# Patient Record
Sex: FEMALE | Race: WHITE | NOT HISPANIC OR LATINO | Employment: OTHER | URBAN - NONMETROPOLITAN AREA
[De-identification: names, ages, dates, MRNs, and addresses within clinical notes are randomized per-mention and may not be internally consistent; named-entity substitution may affect disease eponyms.]

---

## 2017-01-16 RX ORDER — PROMETHAZINE HYDROCHLORIDE 25 MG/1
TABLET ORAL
Qty: 60 TABLET | Refills: 0 | Status: SHIPPED | OUTPATIENT
Start: 2017-01-16 | End: 2017-07-05 | Stop reason: SDUPTHER

## 2017-02-06 ENCOUNTER — OFFICE VISIT (OUTPATIENT)
Dept: GASTROENTEROLOGY | Facility: CLINIC | Age: 57
End: 2017-02-06

## 2017-02-06 VITALS
SYSTOLIC BLOOD PRESSURE: 136 MMHG | WEIGHT: 190.4 LBS | OXYGEN SATURATION: 92 % | HEIGHT: 67 IN | DIASTOLIC BLOOD PRESSURE: 79 MMHG | HEART RATE: 88 BPM | BODY MASS INDEX: 29.88 KG/M2

## 2017-02-06 DIAGNOSIS — K92.1 HEMATOCHEZIA: ICD-10-CM

## 2017-02-06 DIAGNOSIS — R14.0 ABDOMINAL BLOATING: Primary | ICD-10-CM

## 2017-02-06 DIAGNOSIS — K59.00 CONSTIPATION, UNSPECIFIED CONSTIPATION TYPE: ICD-10-CM

## 2017-02-06 DIAGNOSIS — R11.0 NAUSEA: ICD-10-CM

## 2017-02-06 DIAGNOSIS — T40.2X5A THERAPEUTIC OPIOID INDUCED CONSTIPATION: ICD-10-CM

## 2017-02-06 DIAGNOSIS — R10.84 GENERALIZED ABDOMINAL PAIN: ICD-10-CM

## 2017-02-06 DIAGNOSIS — K59.03 THERAPEUTIC OPIOID INDUCED CONSTIPATION: ICD-10-CM

## 2017-02-06 PROCEDURE — 99214 OFFICE O/P EST MOD 30 MIN: CPT | Performed by: PHYSICIAN ASSISTANT

## 2017-02-06 NOTE — PROGRESS NOTES
Chief Complaint   Patient presents with   • GI Problem     proctitis   • Heartburn     GERD       Nasrin Lizama is a 56 y.o. female who presents to the office today for follow up appointment for GI Problem (proctitis) and Heartburn (GERD)  .    HPI    The patient is here for a follow up of proctitis and heartburn.  She reports that when she uses the bathroom, she has a very small amount of small with a small amount of blood in it.  Patient reports no rectal pain other than hemorrhoids.  She takes zantac 150 mg every morning for acid reflux and takes TUMS almost every morning.  Patient also reports nausea, occasional abdominal pain in lower quadrants.  She takes oxycodone 2-3 times per day.  In the past, the patient has taken Miralax, Amitiza, Apriso, and over the counter stool softeners which have failed to alleviate constipation.      Review of Systems   Constitutional: Positive for fatigue.   HENT: Positive for congestion, rhinorrhea, sneezing and sore throat.    Eyes: Negative.    Respiratory: Positive for cough, shortness of breath and wheezing.    Cardiovascular: Negative.    Gastrointestinal: Positive for abdominal distention, abdominal pain, anal bleeding, blood in stool, constipation and nausea. Negative for diarrhea, rectal pain and vomiting.   Endocrine: Negative.    Genitourinary: Positive for difficulty urinating.   Musculoskeletal: Positive for arthralgias, back pain and myalgias.   Skin: Positive for rash.   Allergic/Immunologic: Positive for environmental allergies.   Neurological: Negative.    Hematological: Bruises/bleeds easily.   Psychiatric/Behavioral: Positive for sleep disturbance. The patient is nervous/anxious.        ACTIVE PROBLEMS:   Specialty Problems        Gastroenterology Problems    Constipation        Irritable bowel syndrome without diarrhea              PAST MEDICAL HISTORY:  Past Medical History   Diagnosis Date   • Anxiety    • Arthritis    • Back pain    • COPD (chronic  obstructive pulmonary disease)    • Depression    • Diabetes mellitus    • H/O colonoscopy        SURGICAL HISTORY:  Past Surgical History   Procedure Laterality Date   • Appendectomy     • Hysterectomy     • Nose surgery     • Tubal abdominal ligation     • Colonoscopy     • Upper gastrointestinal endoscopy     • Endoscopy N/A 6/22/2016     Procedure: ESOPHAGOGASTRODUODENOSCOPY W/ BIOPSY;  Surgeon: Junior Carver III, MD;  Location: [facility] OR;  Service:    • Colonoscopy N/A 6/22/2016     Procedure: COLONOSCOPY;  Surgeon: Junior Carver III, MD;  Location: [facility] OR;  Service:        FAMILY HISTORY:  Family History   Problem Relation Age of Onset   • Heart attack Mother      acute myocard infarction   • Cancer Other      cancer and breast cancer   • Diabetes Other    • Heart disease Other    • Multiple myeloma Other        SOCIAL HISTORY:  Social History   Substance Use Topics   • Smoking status: Current Every Day Smoker     Packs/day: 1.50     Years: 15.00     Types: Cigarettes   • Smokeless tobacco: Not on file   • Alcohol use No       CURRENT MEDICATION:    Current Outpatient Prescriptions:   •  Canagliflozin (INVOKANA) 300 MG capsule, Take 300 mg by mouth daily., Disp: , Rfl:   •  clonazePAM (KlonoPIN) 1 MG tablet, Take 1 mg by mouth 2 (two) times a day as needed for seizures., Disp: , Rfl:   •  dicyclomine (BENTYL) 20 MG tablet, Take 1 tablet by mouth every 6 (six) hours as needed., Disp: , Rfl:   •  fenofibrate (TRICOR) 145 MG tablet, Take 145 mg by mouth daily., Disp: , Rfl:   •  glimepiride (AMARYL) 4 MG tablet, Take 4 mg by mouth every morning before breakfast., Disp: , Rfl:   •  insulin glargine (LANTUS) 100 UNIT/ML injection, Inject  under the skin daily., Disp: , Rfl:   •  lisinopril (PRINIVIL,ZESTRIL) 2.5 MG tablet, Take 2.5 mg by mouth daily., Disp: , Rfl:   •  lubiprostone (AMITIZA) 8 MCG capsule, Take 1 capsule by mouth 2 (two) times a day. With food., Disp: 60 capsule, Rfl: 5  •   "mesalamine (APRISO) 0.375 G 24 hr capsule, Take 4 capsules daily, Disp: 120 capsule, Rfl: 5  •  metFORMIN (GLUCOPHAGE) 500 MG tablet, Take 1 tablet by mouth daily., Disp: , Rfl:   •  oxyCODONE-acetaminophen (PERCOCET) 7.5-325 MG per tablet, Take 1 tablet by mouth every 6 (six) hours as needed., Disp: , Rfl:   •  pantoprazole (PROTONIX) 20 MG EC tablet, Take 1 tablet by mouth daily. Take it at least 30 minutes before a meal., Disp: 30 tablet, Rfl: 5  •  PARoxetine (PAXIL) 20 MG tablet, Take 1 tablet by mouth daily. As directed., Disp: , Rfl:   •  polyethylene glycol (MIRALAX) powder, Take  by mouth 2 (two) times a day. Mix 17 grams of water and drink twice daily., Disp: , Rfl:   •  pravastatin (PRAVACHOL) 40 MG tablet, Take 1 tablet by mouth daily., Disp: , Rfl:   •  pregabalin (LYRICA) 150 MG capsule, Take 150 mg by mouth 2 (two) times a day., Disp: , Rfl:   •  promethazine (PHENERGAN) 25 MG tablet, TAKE ONE TABLET BY MOUTH EVERY 6 HOURS AS NEEDED FOR NAUSEA., Disp: 60 tablet, Rfl: 0  •  rosuvastatin (CRESTOR) 10 MG tablet, Take 10 mg by mouth daily., Disp: , Rfl:   •  sitaGLIPtin (JANUVIA) 100 MG tablet, Take 100 mg by mouth daily., Disp: , Rfl:   •  Naloxegol Oxalate (MOVANTIK) 25 MG tablet, Take 25 mg by mouth Daily., Disp: 30 tablet, Rfl: 5    ALLERGIES:  Review of patient's allergies indicates no known allergies.    VISIT VITALS:  Visit Vitals   • /79   • Pulse 88   • Ht 67\" (170.2 cm)   • Wt 190 lb 6.4 oz (86.4 kg)   • SpO2 92%   • BMI 29.82 kg/m2       Physical Exam   Constitutional: She is oriented to person, place, and time. She appears well-developed and well-nourished. No distress.   HENT:   Head: Normocephalic and atraumatic.   Right Ear: External ear normal.   Left Ear: External ear normal.   Nose: Nose normal.   Mouth/Throat: Oropharynx is clear and moist. No oropharyngeal exudate.   Eyes: Conjunctivae and EOM are normal. Pupils are equal, round, and reactive to light. Right eye exhibits no " discharge. Left eye exhibits no discharge. No scleral icterus.   Neck: Normal range of motion. Neck supple. No JVD present. No tracheal deviation present. No thyromegaly present.   Cardiovascular: Normal rate, regular rhythm, normal heart sounds and intact distal pulses.  Exam reveals no gallop and no friction rub.    No murmur heard.  Pulmonary/Chest: Effort normal and breath sounds normal. No stridor. No respiratory distress. She has no wheezes. She has no rales. She exhibits no tenderness.   Abdominal: Soft. Bowel sounds are normal. She exhibits distension. She exhibits no mass. There is tenderness (diffuse). There is no rebound and no guarding. No hernia.   Musculoskeletal: She exhibits no edema, tenderness or deformity.   Lymphadenopathy:     She has no cervical adenopathy.   Neurological: She is alert and oriented to person, place, and time. She has normal reflexes. She displays normal reflexes. No cranial nerve deficit. She exhibits normal muscle tone. Coordination normal.   Skin: Skin is warm and dry. No rash noted. She is not diaphoretic. No erythema. No pallor.   Psychiatric: She has a normal mood and affect. Her behavior is normal. Judgment and thought content normal.       Assessment/Plan      Diagnosis Plan   1. Abdominal bloating     2. Generalized abdominal pain     3. Constipation, unspecified constipation type     4. Nausea     5. Hematochezia     6. Therapeutic opioid induced constipation       It is recommended that the patient begin Movantik 25 mg daily for opioid-induced constipation.  She voiced understanding and agreement of recommendations.  Return in about 4 weeks (around 3/6/2017) for Recheck.         FRANCISCO Medina

## 2017-03-02 ENCOUNTER — TELEPHONE (OUTPATIENT)
Dept: MAMMOGRAPHY | Facility: HOSPITAL | Age: 57
End: 2017-03-02

## 2017-03-08 ENCOUNTER — TELEPHONE (OUTPATIENT)
Dept: MAMMOGRAPHY | Facility: HOSPITAL | Age: 57
End: 2017-03-08

## 2017-04-27 ENCOUNTER — TRANSCRIBE ORDERS (OUTPATIENT)
Dept: ADMINISTRATIVE | Facility: HOSPITAL | Age: 57
End: 2017-04-27

## 2017-04-27 DIAGNOSIS — Z12.31 VISIT FOR SCREENING MAMMOGRAM: Primary | ICD-10-CM

## 2017-05-12 ENCOUNTER — APPOINTMENT (OUTPATIENT)
Dept: MAMMOGRAPHY | Facility: HOSPITAL | Age: 57
End: 2017-05-12

## 2017-05-12 ENCOUNTER — TRANSCRIBE ORDERS (OUTPATIENT)
Dept: ADMINISTRATIVE | Facility: HOSPITAL | Age: 57
End: 2017-05-12

## 2017-05-12 DIAGNOSIS — Z12.31 VISIT FOR SCREENING MAMMOGRAM: Primary | ICD-10-CM

## 2017-05-26 ENCOUNTER — HOSPITAL ENCOUNTER (OUTPATIENT)
Dept: MAMMOGRAPHY | Facility: HOSPITAL | Age: 57
End: 2017-05-26

## 2017-05-31 ENCOUNTER — HOSPITAL ENCOUNTER (OUTPATIENT)
Dept: MAMMOGRAPHY | Facility: HOSPITAL | Age: 57
Discharge: HOME | End: 2017-05-31
Admitting: FAMILY MEDICINE

## 2017-05-31 DIAGNOSIS — Z12.31 VISIT FOR SCREENING MAMMOGRAM: ICD-10-CM

## 2017-05-31 PROCEDURE — 77063 BREAST TOMOSYNTHESIS BI: CPT

## 2017-05-31 PROCEDURE — G0202 SCR MAMMO BI INCL CAD: HCPCS | Performed by: RADIOLOGY

## 2017-05-31 PROCEDURE — 77063 BREAST TOMOSYNTHESIS BI: CPT | Performed by: RADIOLOGY

## 2017-05-31 PROCEDURE — G0202 SCR MAMMO BI INCL CAD: HCPCS

## 2017-06-09 ENCOUNTER — DOCUMENTATION (OUTPATIENT)
Dept: GASTROENTEROLOGY | Facility: CLINIC | Age: 57
End: 2017-06-09

## 2017-06-09 NOTE — PROGRESS NOTES
Patient called wanting to reschedule her appointment she had to cancel last time. She is in [place] and wont be back until the week of 6/20/17. I scheduled her a follow up for 6/23/17. She states she is having bleeding when she uses the bathroom. She says the toilet paper has a lot on it and there is blood in the toilet. She said if it continued she would come back home sooner. I told her if she continued to experience those symptoms, she needed to go to the emergency room where she was at. Patient voiced understanding.

## 2017-07-05 ENCOUNTER — TELEPHONE (OUTPATIENT)
Dept: GASTROENTEROLOGY | Facility: CLINIC | Age: 57
End: 2017-07-05

## 2017-07-05 RX ORDER — PROMETHAZINE HYDROCHLORIDE 25 MG/1
25 TABLET ORAL EVERY 6 HOURS PRN
Qty: 30 TABLET | Refills: 2 | Status: SHIPPED | OUTPATIENT
Start: 2017-07-05 | End: 2018-07-16 | Stop reason: SDUPTHER

## 2017-07-05 NOTE — TELEPHONE ENCOUNTER
I renewed the patient's prescription for Phenergan (promethazine) 25 mg every 6 hours as needed.  ROCKY

## 2017-10-02 ENCOUNTER — OFFICE VISIT (OUTPATIENT)
Dept: GASTROENTEROLOGY | Facility: CLINIC | Age: 57
End: 2017-10-02

## 2017-10-02 VITALS
HEART RATE: 93 BPM | BODY MASS INDEX: 29.82 KG/M2 | SYSTOLIC BLOOD PRESSURE: 132 MMHG | WEIGHT: 190 LBS | HEIGHT: 67 IN | DIASTOLIC BLOOD PRESSURE: 76 MMHG | OXYGEN SATURATION: 90 %

## 2017-10-02 DIAGNOSIS — K62.89 PROCTITIS: ICD-10-CM

## 2017-10-02 DIAGNOSIS — L29.0 ANAL PRURITUS: ICD-10-CM

## 2017-10-02 DIAGNOSIS — K29.50 CHRONIC GASTRITIS WITHOUT BLEEDING, UNSPECIFIED GASTRITIS TYPE: ICD-10-CM

## 2017-10-02 DIAGNOSIS — K21.9 GASTROESOPHAGEAL REFLUX DISEASE WITHOUT ESOPHAGITIS: Primary | ICD-10-CM

## 2017-10-02 PROCEDURE — 99213 OFFICE O/P EST LOW 20 MIN: CPT | Performed by: INTERNAL MEDICINE

## 2017-10-02 RX ORDER — PANTOPRAZOLE SODIUM 40 MG/1
TABLET, DELAYED RELEASE ORAL
Qty: 30 TABLET | Refills: 5 | Status: SHIPPED | OUTPATIENT
Start: 2017-10-02 | End: 2018-10-11 | Stop reason: SINTOL

## 2017-10-02 RX ORDER — MESALAMINE 1000 MG/1
SUPPOSITORY RECTAL
Qty: 30 SUPPOSITORY | Refills: 1 | Status: SHIPPED | OUTPATIENT
Start: 2017-10-02 | End: 2018-02-14

## 2017-10-02 NOTE — PROGRESS NOTES
"Chief Complaint   Patient presents with   • Abdominal Pain   • Diarrhea   • Anal Itching       Nasrin Lizama is a 57 y.o. female who presents to the office today for follow up appointment for Abdominal Pain; Diarrhea; and Anal Itching  .    HPI  57-year-old white female presents for follow-up of GERD, gastritis, proctitis, possible Crohn's disease.  She reports persistent anal pruritus following treatment with Dexilant.  She discontinued Dexilant.  She was treated with both Diflucan and Monistat.  Her anal pruritus has persisted.  She reports \"irregular\" bowel movements--says that she has up to 6 BMs daily.  She reports persistent GERD symptoms despite treatment with Zantac and Tums. EGD and colonoscopy were last performed 6/22/16.        Review of Systems   Constitutional: Negative for chills, fatigue and fever.   HENT: Negative for congestion, sore throat, trouble swallowing and voice change.    Eyes: Positive for pain and visual disturbance.   Respiratory: Positive for cough, shortness of breath and wheezing.    Cardiovascular: Positive for leg swelling. Negative for chest pain and palpitations.   Gastrointestinal: Positive for abdominal distention, abdominal pain, anal bleeding, blood in stool, nausea and rectal pain. Negative for constipation, diarrhea and vomiting.   Endocrine: Negative for cold intolerance and heat intolerance.   Genitourinary: Negative for difficulty urinating and pelvic pain.   Musculoskeletal: Positive for arthralgias, back pain and myalgias.   Skin: Negative for rash and wound.   Allergic/Immunologic: Positive for environmental allergies. Negative for food allergies.   Neurological: Negative for dizziness and headaches.   Hematological: Bruises/bleeds easily.   Psychiatric/Behavioral: Positive for sleep disturbance. The patient is nervous/anxious.        ACTIVE PROBLEMS:   Specialty Problems        Gastroenterology Problems    Constipation        Irritable bowel syndrome without " diarrhea        Therapeutic opioid induced constipation              PAST MEDICAL HISTORY:  Past Medical History:   Diagnosis Date   • Anxiety    • Arthritis    • Back pain    • COPD (chronic obstructive pulmonary disease)    • Depression    • Diabetes mellitus    • H/O colonoscopy        SURGICAL HISTORY:  Past Surgical History:   Procedure Laterality Date   • APPENDECTOMY     • BREAST BIOPSY Right 2014    benign   • BREAST BIOPSY Right 2001    benign   • COLONOSCOPY     • COLONOSCOPY N/A 6/22/2016    Procedure: COLONOSCOPY;  Surgeon: Junior Carver III, MD;  Location: [facility] OR;  Service:    • ENDOSCOPY N/A 6/22/2016    Procedure: ESOPHAGOGASTRODUODENOSCOPY W/ BIOPSY;  Surgeon: Junior Carver III, MD;  Location: [facility] OR;  Service:    • HYSTERECTOMY     • NOSE SURGERY     • TUBAL ABDOMINAL LIGATION     • UPPER GASTROINTESTINAL ENDOSCOPY         FAMILY HISTORY:  Family History   Problem Relation Age of Onset   • Heart attack Mother      acute myocard infarction   • Cancer Other      cancer and breast cancer   • Diabetes Other    • Heart disease Other    • Multiple myeloma Other    • Breast cancer Maternal Aunt    • Breast cancer Maternal Aunt        SOCIAL HISTORY:  Social History   Substance Use Topics   • Smoking status: Current Every Day Smoker     Packs/day: 1.50     Years: 15.00     Types: Cigarettes   • Smokeless tobacco: Not on file   • Alcohol use No       CURRENT MEDICATION:    Current Outpatient Prescriptions:   •  clonazePAM (KlonoPIN) 1 MG tablet, Take 1 mg by mouth 2 (two) times a day as needed for seizures., Disp: , Rfl:   •  fenofibrate (TRICOR) 145 MG tablet, Take 145 mg by mouth daily., Disp: , Rfl:   •  glimepiride (AMARYL) 4 MG tablet, Take 4 mg by mouth every morning before breakfast., Disp: , Rfl:   •  insulin glargine (LANTUS) 100 UNIT/ML injection, Inject  under the skin daily., Disp: , Rfl:   •  lisinopril (PRINIVIL,ZESTRIL) 2.5 MG tablet, Take 2.5 mg by mouth daily., Disp: , Rfl:  "  •  metFORMIN (GLUCOPHAGE) 500 MG tablet, Take 1 tablet by mouth daily., Disp: , Rfl:   •  oxyCODONE-acetaminophen (PERCOCET) 7.5-325 MG per tablet, Take 1 tablet by mouth every 6 (six) hours as needed., Disp: , Rfl:   •  PARoxetine (PAXIL) 20 MG tablet, Take 1 tablet by mouth daily. As directed., Disp: , Rfl:   •  pravastatin (PRAVACHOL) 40 MG tablet, Take 1 tablet by mouth daily., Disp: , Rfl:   •  pregabalin (LYRICA) 150 MG capsule, Take 150 mg by mouth 2 (two) times a day., Disp: , Rfl:   •  promethazine (PHENERGAN) 25 MG tablet, Take 1 tablet by mouth Every 6 (Six) Hours As Needed for Nausea or Vomiting., Disp: 30 tablet, Rfl: 2  •  rosuvastatin (CRESTOR) 10 MG tablet, Take 10 mg by mouth daily., Disp: , Rfl:   •  sitaGLIPtin (JANUVIA) 100 MG tablet, Take 100 mg by mouth daily., Disp: , Rfl:   •  Canagliflozin (INVOKANA) 300 MG capsule, Take 300 mg by mouth daily., Disp: , Rfl:   •  mesalamine (CANASA) 1000 MG suppository, Insert one suppository into rectum every evening, Disp: 30 suppository, Rfl: 1  •  pantoprazole (PROTONIX) 40 MG EC tablet, Take 1 tablet daily about 30 min before breakfast, Disp: 30 tablet, Rfl: 5    ALLERGIES:  Review of patient's allergies indicates no known allergies.    VISIT VITALS:  /76  Pulse 93  Ht 67\" (170.2 cm)  Wt 190 lb (86.2 kg)  SpO2 90%  BMI 29.76 kg/m2    Physical Exam   Constitutional: She is oriented to person, place, and time. She appears well-developed and well-nourished.   HENT:   Head: Normocephalic and atraumatic.   Eyes: Conjunctivae and EOM are normal. Pupils are equal, round, and reactive to light.   Neck: Normal range of motion. Neck supple.   Cardiovascular: Normal rate, regular rhythm and normal heart sounds.    Pulmonary/Chest: Effort normal and breath sounds normal.   Abdominal: Soft. Bowel sounds are normal.   Genitourinary:   Genitourinary Comments: No obvious perianal disease was noted.   Musculoskeletal: Normal range of motion.   Neurological: " She is alert and oriented to person, place, and time. She has normal reflexes.   Skin: Skin is warm and dry.   Psychiatric: She has a normal mood and affect. Her behavior is normal.   Nursing note and vitals reviewed.      Assessment/Plan      Diagnosis Plan   1. Gastroesophageal reflux disease without esophagitis     2. Chronic gastritis without bleeding, unspecified gastritis type     3. Proctitis     4. Anal pruritus       REC  I have prescribed Canasa suppositories 1 daily at bedtime.  She was instructed to apply 1% hydrocortisone cream twice a day to her anus for 7-10 days.  For control of GERD symptoms, I have prescribed Protonix 40 mg daily. GYN examination was advised.  Follow-up will be arranged in about 6 weeks and she was instructed to call sooner if needed.  Findings and recommendations were discussed with the patient.    Return in about 6 weeks (around 11/13/2017).         Junior Carver III, MD

## 2018-01-18 ENCOUNTER — OFFICE VISIT (OUTPATIENT)
Dept: GASTROENTEROLOGY | Facility: CLINIC | Age: 58
End: 2018-01-18

## 2018-01-18 VITALS
OXYGEN SATURATION: 89 % | SYSTOLIC BLOOD PRESSURE: 123 MMHG | HEIGHT: 67 IN | DIASTOLIC BLOOD PRESSURE: 70 MMHG | HEART RATE: 100 BPM | BODY MASS INDEX: 28.91 KG/M2 | WEIGHT: 184.2 LBS

## 2018-01-18 DIAGNOSIS — R10.11 RIGHT UPPER QUADRANT ABDOMINAL PAIN: ICD-10-CM

## 2018-01-18 DIAGNOSIS — K92.1 HEMATOCHEZIA: Primary | ICD-10-CM

## 2018-01-18 DIAGNOSIS — K76.0 FATTY LIVER: ICD-10-CM

## 2018-01-18 DIAGNOSIS — R11.0 NAUSEA: ICD-10-CM

## 2018-01-18 DIAGNOSIS — R10.13 EPIGASTRIC PAIN: ICD-10-CM

## 2018-01-18 DIAGNOSIS — K59.00 CONSTIPATION, UNSPECIFIED CONSTIPATION TYPE: ICD-10-CM

## 2018-01-18 PROCEDURE — 99214 OFFICE O/P EST MOD 30 MIN: CPT | Performed by: PHYSICIAN ASSISTANT

## 2018-01-18 NOTE — PROGRESS NOTES
Chief Complaint   Patient presents with   • GERD   • Fecal Incontinence       Nasrin Lizama is a 57 y.o. female who presents to the office today for follow up appointment for GERD and Fecal Incontinence  .    HPI    The patient was seen for a follow up visit.  She continues to have fecal incontinence, constipation, occasional diarrhea, epigastric pain, nausea, hematochezia, anal itching, and abdominal bloating.  After last visit, she was started on Canasa suppositories and hydrocortisone cream.  Patient stated that they helped a small amount but did not alleviate symptoms.  She takes a daily opioid.  Patient has tried Miralax twice a day but she continued to have only very small pieces of stool.    In 2014, the patient had a positive IBD panel.  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  Pathology report indicated possible Crohn's Disease.  CT scan of abdomen in 2014 revealed a fatty liver.  Patient denies alcohol use but reports that she has high cholesterol.  Family history is positive for her sister having non-alcoholic cirrhosis of the liver.    Review of Systems   Constitutional: Negative for chills, fatigue and fever.   HENT: Negative for congestion, sore throat, trouble swallowing and voice change.    Eyes: Positive for pain and visual disturbance.   Respiratory: Positive for cough, shortness of breath and wheezing.    Cardiovascular: Positive for leg swelling. Negative for chest pain and palpitations.   Gastrointestinal: Positive for abdominal distention, abdominal pain, anal bleeding, blood in stool, constipation, diarrhea and nausea. Negative for rectal pain and vomiting.   Endocrine: Negative for cold intolerance and heat intolerance.   Genitourinary: Negative for difficulty urinating and pelvic pain.   Musculoskeletal: Positive for arthralgias, back pain and myalgias.   Skin: Negative for rash and wound.   Allergic/Immunologic: Positive for environmental allergies. Negative for food  allergies.   Neurological: Negative for dizziness and headaches.   Hematological: Bruises/bleeds easily.   Psychiatric/Behavioral: Positive for sleep disturbance. The patient is nervous/anxious.        ACTIVE PROBLEMS:   Specialty Problems        Gastroenterology Problems    Constipation        Irritable bowel syndrome without diarrhea        Therapeutic opioid induced constipation              PAST MEDICAL HISTORY:  Past Medical History:   Diagnosis Date   • Anxiety    • Arthritis    • Back pain    • COPD (chronic obstructive pulmonary disease)    • Depression    • Diabetes mellitus    • H/O colonoscopy        SURGICAL HISTORY:  Past Surgical History:   Procedure Laterality Date   • APPENDECTOMY     • BREAST BIOPSY Right 2014    benign   • BREAST BIOPSY Right 2001    benign   • COLONOSCOPY     • COLONOSCOPY N/A 6/22/2016    Procedure: COLONOSCOPY;  Surgeon: Junior Carver III, MD;  Location: [facility];  Service:    • ENDOSCOPY N/A 6/22/2016    Procedure: ESOPHAGOGASTRODUODENOSCOPY W/ BIOPSY;  Surgeon: Junior Carver III, MD;  Location: [facility];  Service:    • HYSTERECTOMY     • NOSE SURGERY     • TUBAL ABDOMINAL LIGATION     • UPPER GASTROINTESTINAL ENDOSCOPY         FAMILY HISTORY:  Family History   Problem Relation Age of Onset   • Heart attack Mother      acute myocard infarction   • Cancer Other      cancer and breast cancer   • Diabetes Other    • Heart disease Other    • Multiple myeloma Other    • Breast cancer Maternal Aunt    • Breast cancer Maternal Aunt        SOCIAL HISTORY:  Social History   Substance Use Topics   • Smoking status: Current Every Day Smoker     Packs/day: 1.50     Years: 15.00     Types: Cigarettes   • Smokeless tobacco: Never Used   • Alcohol use No       CURRENT MEDICATION:    Current Outpatient Prescriptions:   •  Canagliflozin (INVOKANA) 300 MG capsule, Take 300 mg by mouth daily., Disp: , Rfl:   •  clonazePAM (KlonoPIN) 1 MG tablet, Take 1 mg by mouth 2 (two) times a  day as needed for seizures., Disp: , Rfl:   •  fenofibrate (TRICOR) 145 MG tablet, Take 145 mg by mouth daily., Disp: , Rfl:   •  glimepiride (AMARYL) 4 MG tablet, Take 4 mg by mouth every morning before breakfast., Disp: , Rfl:   •  insulin glargine (LANTUS) 100 UNIT/ML injection, Inject  under the skin daily., Disp: , Rfl:   •  lisinopril (PRINIVIL,ZESTRIL) 2.5 MG tablet, Take 2.5 mg by mouth daily., Disp: , Rfl:   •  mesalamine (CANASA) 1000 MG suppository, Insert one suppository into rectum every evening, Disp: 30 suppository, Rfl: 1  •  metFORMIN (GLUCOPHAGE) 500 MG tablet, Take 1 tablet by mouth daily., Disp: , Rfl:   •  oxyCODONE-acetaminophen (PERCOCET) 7.5-325 MG per tablet, Take 1 tablet by mouth every 6 (six) hours as needed., Disp: , Rfl:   •  pantoprazole (PROTONIX) 40 MG EC tablet, Take 1 tablet daily about 30 min before breakfast, Disp: 30 tablet, Rfl: 5  •  PARoxetine (PAXIL) 20 MG tablet, Take 1 tablet by mouth daily. As directed., Disp: , Rfl:   •  pravastatin (PRAVACHOL) 40 MG tablet, Take 1 tablet by mouth daily., Disp: , Rfl:   •  pregabalin (LYRICA) 150 MG capsule, Take 150 mg by mouth 2 (two) times a day., Disp: , Rfl:   •  promethazine (PHENERGAN) 25 MG tablet, Take 1 tablet by mouth Every 6 (Six) Hours As Needed for Nausea or Vomiting., Disp: 30 tablet, Rfl: 2  •  rosuvastatin (CRESTOR) 10 MG tablet, Take 10 mg by mouth daily., Disp: , Rfl:   •  sitaGLIPtin (JANUVIA) 100 MG tablet, Take 100 mg by mouth daily., Disp: , Rfl:   •  linaclotide (LINZESS) 145 MCG capsule capsule, Take 1 tablet once daily on an empty stomach at least 30 minutes prior to the first meal of the day., Disp: 30 capsule, Rfl: 5  •  polyethylene glycol (GoLYTELY) 236 g solution, Starting at 6 pm on day prior to procedure, drink 8 ounces every 15 minutes until all gone or stools are clear. May add flavor packet., Disp: 4000 mL, Rfl: 0    ALLERGIES:  Review of patient's allergies indicates no known allergies.    VISIT  "VITALS:  /70  Pulse 100  Ht 170.2 cm (67\")  Wt 83.6 kg (184 lb 3.2 oz)  SpO2 (!) 89%  BMI 28.85 kg/m2    Physical Exam   Constitutional: She is oriented to person, place, and time. She appears well-developed and well-nourished. No distress.   HENT:   Head: Normocephalic and atraumatic.   Right Ear: External ear normal.   Left Ear: External ear normal.   Nose: Nose normal.   Mouth/Throat: Oropharynx is clear and moist. No oropharyngeal exudate.   Eyes: Conjunctivae and EOM are normal. Pupils are equal, round, and reactive to light. Right eye exhibits no discharge. Left eye exhibits no discharge. No scleral icterus.   Neck: Normal range of motion. Neck supple. No JVD present. No tracheal deviation present. No thyromegaly present.   Cardiovascular: Normal rate, regular rhythm, normal heart sounds and intact distal pulses.  Exam reveals no gallop and no friction rub.    No murmur heard.  Pulmonary/Chest: Effort normal. No stridor. No respiratory distress. She has wheezes. She has rales. She exhibits no tenderness.   Abdominal: Soft. Bowel sounds are normal. She exhibits no distension and no mass. There is tenderness (RUQ, epigastric). There is no rebound and no guarding. No hernia.   Musculoskeletal: She exhibits no edema, tenderness or deformity.   Lymphadenopathy:     She has no cervical adenopathy.   Neurological: She is alert and oriented to person, place, and time. She has normal reflexes. She displays normal reflexes. No cranial nerve deficit. She exhibits normal muscle tone. Coordination normal.   Skin: Skin is warm and dry. No rash noted. She is not diaphoretic. No erythema. No pallor.   Psychiatric: She has a normal mood and affect. Her behavior is normal. Judgment and thought content normal.       Assessment/Plan      Diagnosis Plan   1. Hematochezia  CBC & Differential    Case Request   2. Epigastric pain  Comprehensive Metabolic Panel    US Liver    Case Request   3. Right upper quadrant abdominal " pain  Comprehensive Metabolic Panel    US Liver    Case Request   4. Constipation, unspecified constipation type  Case Request   5. Nausea  Case Request   6. Fatty liver  Comprehensive Metabolic Panel    US Liver    Case Request     The patient will have an EGD/colonoscopy due to symptoms and question of IBD.  Procedures were explained to the patient.  She will also have an US of her liver and lab work, including a CBC and CMP due to RUQ pain and history of fatty liver in setting of high cholesterol.  Patient will be started on Linzess 145 mcg for opioid-induced constipation.  She voiced understanding and agreement of recommendations.    Return in about 4 weeks (around 2/15/2018) for Recheck.         FRANCISCO Medina

## 2018-01-19 ENCOUNTER — TRANSCRIBE ORDERS (OUTPATIENT)
Dept: GENERAL RADIOLOGY | Facility: HOSPITAL | Age: 58
End: 2018-01-19

## 2018-01-19 ENCOUNTER — LAB (OUTPATIENT)
Dept: LAB | Facility: HOSPITAL | Age: 58
End: 2018-01-19

## 2018-01-19 ENCOUNTER — HOSPITAL ENCOUNTER (OUTPATIENT)
Dept: GENERAL RADIOLOGY | Facility: HOSPITAL | Age: 58
Discharge: HOME | End: 2018-01-19
Admitting: INTERNAL MEDICINE

## 2018-01-19 DIAGNOSIS — R10.11 RIGHT UPPER QUADRANT ABDOMINAL PAIN: ICD-10-CM

## 2018-01-19 DIAGNOSIS — R10.13 EPIGASTRIC PAIN: ICD-10-CM

## 2018-01-19 DIAGNOSIS — K92.1 HEMATOCHEZIA: ICD-10-CM

## 2018-01-19 DIAGNOSIS — R09.02 HYPOXIA: Primary | ICD-10-CM

## 2018-01-19 DIAGNOSIS — R09.02 HYPOXIA: ICD-10-CM

## 2018-01-19 DIAGNOSIS — K76.0 FATTY LIVER: ICD-10-CM

## 2018-01-19 LAB
ALBUMIN SERPL-MCNC: 4.7 G/DL (ref 3.5–5)
ALBUMIN/GLOB SERPL: 1.5 G/DL (ref 1.5–2.5)
ALP SERPL-CCNC: 73 U/L (ref 35–104)
ALT SERPL W P-5'-P-CCNC: 46 U/L (ref 10–36)
ANION GAP SERPL CALCULATED.3IONS-SCNC: 3.5 MMOL/L (ref 3.6–11.2)
AST SERPL-CCNC: 41 U/L (ref 10–30)
BASOPHILS # BLD AUTO: 0.04 10*3/MM3 (ref 0–0.3)
BASOPHILS NFR BLD AUTO: 0.6 % (ref 0–2)
BILIRUB SERPL-MCNC: 0.3 MG/DL (ref 0.2–1.8)
BUN BLD-MCNC: 24 MG/DL (ref 7–21)
BUN/CREAT SERPL: 14.5 (ref 7–25)
CALCIUM SPEC-SCNC: 9.8 MG/DL (ref 7.7–10)
CHLORIDE SERPL-SCNC: 105 MMOL/L (ref 99–112)
CO2 SERPL-SCNC: 26.5 MMOL/L (ref 24.3–31.9)
CREAT BLD-MCNC: 1.65 MG/DL (ref 0.43–1.29)
DEPRECATED RDW RBC AUTO: 41.7 FL (ref 37–54)
EOSINOPHIL # BLD AUTO: 0.22 10*3/MM3 (ref 0–0.7)
EOSINOPHIL NFR BLD AUTO: 3.2 % (ref 0–5)
ERYTHROCYTE [DISTWIDTH] IN BLOOD BY AUTOMATED COUNT: 13.2 % (ref 11.5–14.5)
GFR SERPL CREATININE-BSD FRML MDRD: 32 ML/MIN/1.73
GLOBULIN UR ELPH-MCNC: 3.2 GM/DL
GLUCOSE BLD-MCNC: 126 MG/DL (ref 70–110)
HCT VFR BLD AUTO: 39.7 % (ref 37–47)
HGB BLD-MCNC: 12.9 G/DL (ref 12–16)
IMM GRANULOCYTES # BLD: 0.05 10*3/MM3 (ref 0–0.03)
IMM GRANULOCYTES NFR BLD: 0.7 % (ref 0–0.5)
LYMPHOCYTES # BLD AUTO: 1.67 10*3/MM3 (ref 1–3)
LYMPHOCYTES NFR BLD AUTO: 23.9 % (ref 21–51)
MCH RBC QN AUTO: 28.9 PG (ref 27–33)
MCHC RBC AUTO-ENTMCNC: 32.5 G/DL (ref 33–37)
MCV RBC AUTO: 88.8 FL (ref 80–94)
MONOCYTES # BLD AUTO: 0.82 10*3/MM3 (ref 0.1–0.9)
MONOCYTES NFR BLD AUTO: 11.7 % (ref 0–10)
NEUTROPHILS # BLD AUTO: 4.18 10*3/MM3 (ref 1.4–6.5)
NEUTROPHILS NFR BLD AUTO: 59.9 % (ref 30–70)
OSMOLALITY SERPL CALC.SUM OF ELEC: 275.7 MOSM/KG (ref 273–305)
PLATELET # BLD AUTO: 422 10*3/MM3 (ref 130–400)
PMV BLD AUTO: 11 FL (ref 6–10)
POTASSIUM BLD-SCNC: 4.8 MMOL/L (ref 3.5–5.3)
PROT SERPL-MCNC: 7.9 G/DL (ref 6–8)
RBC # BLD AUTO: 4.47 10*6/MM3 (ref 4.2–5.4)
SODIUM BLD-SCNC: 135 MMOL/L (ref 135–153)
WBC NRBC COR # BLD: 6.98 10*3/MM3 (ref 4.5–12.5)

## 2018-01-19 PROCEDURE — 71046 X-RAY EXAM CHEST 2 VIEWS: CPT | Performed by: RADIOLOGY

## 2018-01-19 PROCEDURE — 85025 COMPLETE CBC W/AUTO DIFF WBC: CPT

## 2018-01-19 PROCEDURE — 71046 X-RAY EXAM CHEST 2 VIEWS: CPT

## 2018-01-19 PROCEDURE — 80053 COMPREHEN METABOLIC PANEL: CPT

## 2018-02-14 RX ORDER — HYDROXYZINE HYDROCHLORIDE 25 MG/1
TABLET, FILM COATED ORAL
COMMUNITY
Start: 2017-12-27

## 2018-02-15 ENCOUNTER — ANESTHESIA EVENT (OUTPATIENT)
Dept: PERIOP | Facility: HOSPITAL | Age: 58
End: 2018-02-15

## 2018-02-15 ENCOUNTER — HOSPITAL ENCOUNTER (OUTPATIENT)
Facility: HOSPITAL | Age: 58
Setting detail: HOSPITAL OUTPATIENT SURGERY
Discharge: HOME | End: 2018-02-15
Attending: INTERNAL MEDICINE | Admitting: INTERNAL MEDICINE

## 2018-02-15 ENCOUNTER — ANESTHESIA (OUTPATIENT)
Dept: PERIOP | Facility: HOSPITAL | Age: 58
End: 2018-02-15

## 2018-02-15 VITALS
SYSTOLIC BLOOD PRESSURE: 123 MMHG | RESPIRATION RATE: 20 BRPM | OXYGEN SATURATION: 92 % | HEART RATE: 65 BPM | WEIGHT: 188 LBS | DIASTOLIC BLOOD PRESSURE: 68 MMHG | HEIGHT: 67 IN | TEMPERATURE: 97.9 F | BODY MASS INDEX: 29.51 KG/M2

## 2018-02-15 DIAGNOSIS — K92.1 HEMATOCHEZIA: ICD-10-CM

## 2018-02-15 DIAGNOSIS — R10.11 RIGHT UPPER QUADRANT ABDOMINAL PAIN: ICD-10-CM

## 2018-02-15 DIAGNOSIS — K59.00 CONSTIPATION, UNSPECIFIED CONSTIPATION TYPE: ICD-10-CM

## 2018-02-15 DIAGNOSIS — R10.13 EPIGASTRIC PAIN: ICD-10-CM

## 2018-02-15 DIAGNOSIS — R11.0 NAUSEA: ICD-10-CM

## 2018-02-15 DIAGNOSIS — K76.0 FATTY LIVER: ICD-10-CM

## 2018-02-15 LAB
027 TOXIN: NORMAL
C DIFF TOX GENS STL QL NAA+PROBE: NEGATIVE
GLUCOSE BLDC GLUCOMTR-MCNC: 143 MG/DL (ref 70–130)

## 2018-02-15 PROCEDURE — 87046 STOOL CULTR AEROBIC BACT EA: CPT | Performed by: INTERNAL MEDICINE

## 2018-02-15 PROCEDURE — 43239 EGD BIOPSY SINGLE/MULTIPLE: CPT | Performed by: INTERNAL MEDICINE

## 2018-02-15 PROCEDURE — 82962 GLUCOSE BLOOD TEST: CPT

## 2018-02-15 PROCEDURE — 87899 AGENT NOS ASSAY W/OPTIC: CPT | Performed by: INTERNAL MEDICINE

## 2018-02-15 PROCEDURE — 25010000002 MIDAZOLAM PER 1 MG: Performed by: NURSE ANESTHETIST, CERTIFIED REGISTERED

## 2018-02-15 PROCEDURE — 88305 TISSUE EXAM BY PATHOLOGIST: CPT | Performed by: INTERNAL MEDICINE

## 2018-02-15 PROCEDURE — 88342 IMHCHEM/IMCYTCHM 1ST ANTB: CPT | Performed by: INTERNAL MEDICINE

## 2018-02-15 PROCEDURE — 45380 COLONOSCOPY AND BIOPSY: CPT | Performed by: INTERNAL MEDICINE

## 2018-02-15 PROCEDURE — 87209 SMEAR COMPLEX STAIN: CPT | Performed by: INTERNAL MEDICINE

## 2018-02-15 PROCEDURE — 87493 C DIFF AMPLIFIED PROBE: CPT | Performed by: INTERNAL MEDICINE

## 2018-02-15 PROCEDURE — 87186 SC STD MICRODIL/AGAR DIL: CPT | Performed by: INTERNAL MEDICINE

## 2018-02-15 PROCEDURE — 88312 SPECIAL STAINS GROUP 1: CPT | Performed by: INTERNAL MEDICINE

## 2018-02-15 PROCEDURE — 87177 OVA AND PARASITES SMEARS: CPT | Performed by: INTERNAL MEDICINE

## 2018-02-15 PROCEDURE — 25010000002 FENTANYL CITRATE (PF) 100 MCG/2ML SOLUTION: Performed by: NURSE ANESTHETIST, CERTIFIED REGISTERED

## 2018-02-15 PROCEDURE — 87045 FECES CULTURE AEROBIC BACT: CPT | Performed by: INTERNAL MEDICINE

## 2018-02-15 PROCEDURE — 25010000002 PROPOFOL 10 MG/ML EMULSION: Performed by: NURSE ANESTHETIST, CERTIFIED REGISTERED

## 2018-02-15 PROCEDURE — 88313 SPECIAL STAINS GROUP 2: CPT | Performed by: INTERNAL MEDICINE

## 2018-02-15 RX ORDER — FENTANYL CITRATE 50 UG/ML
50 INJECTION, SOLUTION INTRAMUSCULAR; INTRAVENOUS
Status: DISCONTINUED | OUTPATIENT
Start: 2018-02-15 | End: 2018-03-19 | Stop reason: HOSPADM

## 2018-02-15 RX ORDER — LIDOCAINE HYDROCHLORIDE 20 MG/ML
INJECTION, SOLUTION INFILTRATION; PERINEURAL AS NEEDED
Status: DISCONTINUED | OUTPATIENT
Start: 2018-02-15 | End: 2018-02-15 | Stop reason: SURG

## 2018-02-15 RX ORDER — ONDANSETRON 2 MG/ML
4 INJECTION INTRAMUSCULAR; INTRAVENOUS ONCE AS NEEDED
Status: DISCONTINUED | OUTPATIENT
Start: 2018-02-15 | End: 2018-03-19 | Stop reason: HOSPADM

## 2018-02-15 RX ORDER — SODIUM CHLORIDE 0.9 % (FLUSH) 0.9 %
1-10 SYRINGE (ML) INJECTION AS NEEDED
Status: DISCONTINUED | OUTPATIENT
Start: 2018-02-15 | End: 2018-03-19 | Stop reason: HOSPADM

## 2018-02-15 RX ORDER — FENTANYL CITRATE 50 UG/ML
INJECTION, SOLUTION INTRAMUSCULAR; INTRAVENOUS AS NEEDED
Status: DISCONTINUED | OUTPATIENT
Start: 2018-02-15 | End: 2018-02-15 | Stop reason: SURG

## 2018-02-15 RX ORDER — IPRATROPIUM BROMIDE AND ALBUTEROL SULFATE 2.5; .5 MG/3ML; MG/3ML
3 SOLUTION RESPIRATORY (INHALATION) ONCE AS NEEDED
Status: DISCONTINUED | OUTPATIENT
Start: 2018-02-15 | End: 2018-03-19 | Stop reason: HOSPADM

## 2018-02-15 RX ORDER — MIDAZOLAM HYDROCHLORIDE 1 MG/ML
INJECTION INTRAMUSCULAR; INTRAVENOUS AS NEEDED
Status: DISCONTINUED | OUTPATIENT
Start: 2018-02-15 | End: 2018-02-15 | Stop reason: SURG

## 2018-02-15 RX ORDER — PROPOFOL 10 MG/ML
VIAL (ML) INTRAVENOUS AS NEEDED
Status: DISCONTINUED | OUTPATIENT
Start: 2018-02-15 | End: 2018-02-15 | Stop reason: SURG

## 2018-02-15 RX ORDER — SODIUM CHLORIDE, SODIUM LACTATE, POTASSIUM CHLORIDE, CALCIUM CHLORIDE 600; 310; 30; 20 MG/100ML; MG/100ML; MG/100ML; MG/100ML
125 INJECTION, SOLUTION INTRAVENOUS CONTINUOUS
Status: DISCONTINUED | OUTPATIENT
Start: 2018-02-15 | End: 2018-03-19 | Stop reason: HOSPADM

## 2018-02-15 RX ADMIN — PROPOFOL 30 MG: 10 INJECTION, EMULSION INTRAVENOUS at 09:05

## 2018-02-15 RX ADMIN — SODIUM CHLORIDE, POTASSIUM CHLORIDE, SODIUM LACTATE AND CALCIUM CHLORIDE: 600; 310; 30; 20 INJECTION, SOLUTION INTRAVENOUS at 08:50

## 2018-02-15 RX ADMIN — PROPOFOL 20 MG: 10 INJECTION, EMULSION INTRAVENOUS at 09:14

## 2018-02-15 RX ADMIN — PROPOFOL 30 MG: 10 INJECTION, EMULSION INTRAVENOUS at 09:11

## 2018-02-15 RX ADMIN — MIDAZOLAM HYDROCHLORIDE 2 MG: 1 INJECTION, SOLUTION INTRAMUSCULAR; INTRAVENOUS at 08:49

## 2018-02-15 RX ADMIN — LIDOCAINE HYDROCHLORIDE 100 MG: 20 INJECTION, SOLUTION INFILTRATION; PERINEURAL at 08:54

## 2018-02-15 RX ADMIN — PROPOFOL 50 MG: 10 INJECTION, EMULSION INTRAVENOUS at 08:54

## 2018-02-15 RX ADMIN — PROPOFOL 50 MG: 10 INJECTION, EMULSION INTRAVENOUS at 08:58

## 2018-02-15 RX ADMIN — PROPOFOL 20 MG: 10 INJECTION, EMULSION INTRAVENOUS at 09:02

## 2018-02-15 RX ADMIN — FENTANYL CITRATE 100 MCG: 50 INJECTION INTRAMUSCULAR; INTRAVENOUS at 08:49

## 2018-02-15 RX ADMIN — PROPOFOL 30 MG: 10 INJECTION, EMULSION INTRAVENOUS at 09:08

## 2018-02-15 NOTE — ANESTHESIA PREPROCEDURE EVALUATION
Anesthesia Evaluation     Patient summary reviewed and Nursing notes reviewed   no history of anesthetic complications:  NPO Solid Status: > 8 hours  NPO Liquid Status: > 8 hours           Airway   Mallampati: III  TM distance: <3 FB  Neck ROM: full  no difficulty expected  Dental - normal exam   (+) poor dentation    Pulmonary - normal exam   (+) a smoker Current Smoked day of surgery, COPD,   (-) asthma  Cardiovascular - normal exam  Exercise tolerance: good (4-7 METS)    NYHA Classification: II    (+) hyperlipidemia  (-) hypertension, past MI, dysrhythmias, angina, CHF      Neuro/Psych  (+) psychiatric history Depression and Anxiety,     (-) seizures, CVA  GI/Hepatic/Renal/Endo    (+)  liver disease, renal disease CRI, diabetes mellitus type 2 using insulin,   (-) hypothyroidism    Musculoskeletal     (+) back pain,   Abdominal  - normal exam    Bowel sounds: normal.   Substance History - negative use     OB/GYN negative ob/gyn ROS         Other   (+) arthritis                     Anesthesia Plan    ASA 3     general     intravenous induction   Anesthetic plan and risks discussed with patient.  Use of blood products discussed with patient  Consented to blood products.

## 2018-02-15 NOTE — ANESTHESIA POSTPROCEDURE EVALUATION
Patient: Nasrin Lizama    Procedure Summary     Date Anesthesia Start Anesthesia Stop Room / Location    02/15/18 0850 0918 [facility] / [facility]       Procedure Diagnosis Surgeon Provider    ESOPHAGOGASTRODUODENOSCOPY WITH BIOPSY  CPTCODE:42125 (N/A Esophagus); COLONOSCOPY  CPTCODE:66409 (N/A ) Hematochezia; Fatty liver; Nausea; Epigastric pain; Right upper quadrant abdominal pain; Constipation, unspecified constipation type  (Hematochezia [K92.1]; Fatty liver [K76.0]; Nausea [R11.0]; Epigastric pain [R10.13]; Right upper quadrant abdominal pain [R10.11]; Constipation, unspecified constipation type [K59.00]) MD Franklin Mancilla III, MD          Anesthesia Type: general  Last vitals  BP   120/72 (02/15/18 0930)   Temp   97.9 °F (36.6 °C) (02/15/18 0920)   Pulse   69 (02/15/18 0930)   Resp   20 (02/15/18 0930)     SpO2   93 % (02/15/18 0930)     Post Anesthesia Care and Evaluation    Patient location during evaluation: PHASE II  Patient participation: complete - patient participated  Level of consciousness: awake and alert  Pain score: 1  Pain management: adequate  Airway patency: patent  Anesthetic complications: No anesthetic complications  PONV Status: controlled  Cardiovascular status: acceptable  Respiratory status: acceptable  Hydration status: acceptable

## 2018-02-16 LAB
O+P SPEC MICRO: NORMAL
OVA + PARASITE RESULT 1: NORMAL

## 2018-02-18 LAB — BACTERIA SPEC AEROBE CULT: NORMAL

## 2018-02-19 LAB
LAB AP CASE REPORT: NORMAL
Lab: NORMAL
PATH REPORT.FINAL DX SPEC: NORMAL

## 2018-02-21 ENCOUNTER — TRANSCRIBE ORDERS (OUTPATIENT)
Dept: ADMINISTRATIVE | Facility: HOSPITAL | Age: 58
End: 2018-02-21

## 2018-02-21 DIAGNOSIS — N18.30 CHRONIC KIDNEY DISEASE, STAGE III (MODERATE) (HCC): Primary | ICD-10-CM

## 2018-03-08 ENCOUNTER — OFFICE VISIT (OUTPATIENT)
Dept: GASTROENTEROLOGY | Facility: CLINIC | Age: 58
End: 2018-03-08

## 2018-03-08 VITALS
HEART RATE: 85 BPM | WEIGHT: 188 LBS | HEIGHT: 67 IN | BODY MASS INDEX: 29.51 KG/M2 | SYSTOLIC BLOOD PRESSURE: 144 MMHG | OXYGEN SATURATION: 94 % | DIASTOLIC BLOOD PRESSURE: 74 MMHG

## 2018-03-08 DIAGNOSIS — K29.50 CHRONIC GASTRITIS WITHOUT BLEEDING, UNSPECIFIED GASTRITIS TYPE: ICD-10-CM

## 2018-03-08 DIAGNOSIS — R79.89 ELEVATED LIVER FUNCTION TESTS: ICD-10-CM

## 2018-03-08 DIAGNOSIS — K50.10 CROHN'S DISEASE OF LARGE INTESTINE WITHOUT COMPLICATION (HCC): Primary | ICD-10-CM

## 2018-03-08 PROCEDURE — 99213 OFFICE O/P EST LOW 20 MIN: CPT | Performed by: INTERNAL MEDICINE

## 2018-03-08 RX ORDER — MESALAMINE 0.38 G/1
375 CAPSULE, EXTENDED RELEASE ORAL 4 TIMES DAILY
Qty: 120 CAPSULE | Refills: 5 | Status: SHIPPED | OUTPATIENT
Start: 2018-03-08 | End: 2018-10-11 | Stop reason: SDUPTHER

## 2018-03-08 NOTE — PROGRESS NOTES
Chief Complaint   Patient presents with   • procedure follow up       Nasrin Lizama is a 57 y.o. female who presents to the office today for follow up appointment for procedure follow up  .    HPI  57-year-old white female presents for follow-up.  She reports persistent diarrhea--says that she is having 8-12 BMs daily with occasional minor rectal bleeding.  EGD and colonoscopy were recently performed.  She was found to have chronic gastritis associated with occasional granulomas.  Colonoscopy showed proctosigmoiditis--biopsies showed chronic colitis with occasional granulomas.        Review of Systems   Constitutional: Negative for chills, fatigue and fever.   HENT: Negative for congestion, sore throat, trouble swallowing and voice change.    Eyes: Positive for pain and visual disturbance.   Respiratory: Positive for cough, shortness of breath and wheezing.    Cardiovascular: Positive for leg swelling. Negative for chest pain and palpitations.   Gastrointestinal: Positive for abdominal distention, abdominal pain, anal bleeding, blood in stool, constipation, diarrhea and nausea. Negative for rectal pain and vomiting.   Endocrine: Negative for cold intolerance and heat intolerance.   Genitourinary: Negative for difficulty urinating and pelvic pain.   Musculoskeletal: Positive for arthralgias, back pain and myalgias.   Skin: Negative for rash and wound.   Allergic/Immunologic: Positive for environmental allergies. Negative for food allergies.   Neurological: Negative for dizziness and headaches.   Hematological: Bruises/bleeds easily.   Psychiatric/Behavioral: Positive for sleep disturbance. The patient is nervous/anxious.        ACTIVE PROBLEMS:   Specialty Problems        Gastroenterology Problems    Constipation        Irritable bowel syndrome without diarrhea        Therapeutic opioid induced constipation        Fatty liver              PAST MEDICAL HISTORY:  Past Medical History:   Diagnosis Date   • Anxiety     • Arthritis    • Back pain    • Chronic kidney disease    • COPD (chronic obstructive pulmonary disease)    • Depression    • Diabetes mellitus    • H/O colonoscopy        SURGICAL HISTORY:  Past Surgical History:   Procedure Laterality Date   • APPENDECTOMY     • BREAST BIOPSY Right 2014    benign   • BREAST BIOPSY Right 2001    benign   • COLONOSCOPY  2016   • COLONOSCOPY N/A 6/22/2016    Procedure: COLONOSCOPY;  Surgeon: Junior Carver III, MD;  Location:  [facility];  Service:    • COLONOSCOPY N/A 2/15/2018    Procedure: COLONOSCOPY  CPTCODE:28417;  Surgeon: uJnior Carver III, MD;  Location:  [facility];  Service:    • ENDOSCOPY N/A 6/22/2016    Procedure: ESOPHAGOGASTRODUODENOSCOPY W/ BIOPSY;  Surgeon: Junior Carver III, MD;  Location:  [facility];  Service:    • ENDOSCOPY N/A 2/15/2018    Procedure: ESOPHAGOGASTRODUODENOSCOPY WITH BIOPSY  CPTCODE:05334;  Surgeon: Junior Carver III, MD;  Location: [facility] OR;  Service:    • HYSTERECTOMY     • NOSE SURGERY     • TUBAL ABDOMINAL LIGATION     • UPPER GASTROINTESTINAL ENDOSCOPY         FAMILY HISTORY:  Family History   Problem Relation Age of Onset   • Heart attack Mother      acute myocard infarction   • Cancer Other      cancer and breast cancer   • Diabetes Other    • Heart disease Other    • Multiple myeloma Other    • Breast cancer Maternal Aunt    • Breast cancer Maternal Aunt        SOCIAL HISTORY:  Social History   Substance Use Topics   • Smoking status: Current Every Day Smoker     Packs/day: 1.00     Years: 15.00     Types: Cigarettes   • Smokeless tobacco: Never Used   • Alcohol use No       CURRENT MEDICATION:  No current facility-administered medications for this visit.     Current Outpatient Prescriptions:   •  mesalamine (APRISO) 0.375 g 24 hr capsule, Take 1 capsule by mouth 4 (Four) Times a Day., Disp: 120 capsule, Rfl: 5    Facility-Administered Medications Ordered in Other Visits:   •  fentaNYL citrate (PF) (SUBLIMAZE)  "injection 50 mcg, 50 mcg, Intravenous, Q5 Min PRN, Vilma Sanz CRNA  •  ipratropium-albuterol (DUO-NEB) nebulizer solution 3 mL, 3 mL, Nebulization, Once PRN, Vilma Sanz CRNA  •  lactated ringers infusion, 125 mL/hr, Intravenous, Continuous, Franklin Armando MD, Stopped at 02/15/18 0917  •  ondansetron (ZOFRAN) injection 4 mg, 4 mg, Intravenous, Once PRN, Vilma Sanz CRNA  •  sodium chloride 0.9 % flush 1-10 mL, 1-10 mL, Intravenous, PRN, Franklin Armando MD    ALLERGIES:  Review of patient's allergies indicates no known allergies.    VISIT VITALS:  /74  Pulse 85  Ht 170.2 cm (67\")  Wt 85.3 kg (188 lb)  SpO2 94%  BMI 29.44 kg/m2    Physical Exam   Constitutional: She is oriented to person, place, and time. She appears well-developed and well-nourished.   HENT:   Head: Normocephalic and atraumatic.   Eyes: Conjunctivae and EOM are normal. Pupils are equal, round, and reactive to light.   Neck: Normal range of motion. Neck supple.   Cardiovascular: Normal rate, regular rhythm and normal heart sounds.    Pulmonary/Chest: Effort normal and breath sounds normal.   Abdominal: Soft. Bowel sounds are normal.   Musculoskeletal: Normal range of motion.   Neurological: She is alert and oriented to person, place, and time. She has normal reflexes.   Skin: Skin is warm and dry.   Psychiatric: She has a normal mood and affect. Her behavior is normal.   Nursing note and vitals reviewed.      Assessment/Plan      Diagnosis Plan   1. Crohn's disease of large intestine without complication     2. Gastritis without bleeding, unspecified chronicity, unspecified gastritis type     3. Elevated liver function tests       REC  Crohn's disease is suspected.  I have prescribed Apriso 4 capsules daily.  Recent lab testing showed low-grade elevation of her liver chemistries.  Liver ultrasound examination was requested and this is pending.  She was instructed to continue Protonix without change.  I advised her to " discontinue Linzess.  Findings and recommendations were discussed with the patient.  I have asked her to return for follow-up in approximately 4 weeks or sooner if needed.    Return in about 4 weeks (around 4/5/2018).         Junior Carver III, MD

## 2018-03-29 ENCOUNTER — TRANSCRIBE ORDERS (OUTPATIENT)
Dept: GENERAL RADIOLOGY | Facility: HOSPITAL | Age: 58
End: 2018-03-29

## 2018-03-29 ENCOUNTER — LAB (OUTPATIENT)
Dept: LAB | Facility: HOSPITAL | Age: 58
End: 2018-03-29
Attending: INTERNAL MEDICINE

## 2018-03-29 ENCOUNTER — HOSPITAL ENCOUNTER (OUTPATIENT)
Dept: ULTRASOUND IMAGING | Facility: HOSPITAL | Age: 58
Discharge: HOME | End: 2018-03-29
Attending: INTERNAL MEDICINE | Admitting: INTERNAL MEDICINE

## 2018-03-29 DIAGNOSIS — N18.30 CHRONIC KIDNEY DISEASE, STAGE III (MODERATE) (HCC): Primary | ICD-10-CM

## 2018-03-29 DIAGNOSIS — N18.30 CHRONIC KIDNEY DISEASE, STAGE III (MODERATE) (HCC): ICD-10-CM

## 2018-03-29 DIAGNOSIS — E78.5 DYSLIPIDEMIA: ICD-10-CM

## 2018-03-29 LAB
ALBUMIN SERPL-MCNC: 4.3 G/DL (ref 3.5–5)
ALBUMIN/GLOB SERPL: 1.3 G/DL (ref 1.5–2.5)
ALP SERPL-CCNC: 88 U/L (ref 35–104)
ALT SERPL W P-5'-P-CCNC: 50 U/L (ref 10–36)
ANION GAP SERPL CALCULATED.3IONS-SCNC: 4.5 MMOL/L (ref 3.6–11.2)
AST SERPL-CCNC: 29 U/L (ref 10–30)
BILIRUB SERPL-MCNC: 0.2 MG/DL (ref 0.2–1.8)
BILIRUB UR QL STRIP: NEGATIVE
BUN BLD-MCNC: 21 MG/DL (ref 7–21)
BUN/CREAT SERPL: 13.4 (ref 7–25)
CALCIUM SPEC-SCNC: 9.5 MG/DL (ref 7.7–10)
CHLORIDE SERPL-SCNC: 105 MMOL/L (ref 99–112)
CK SERPL-CCNC: 104 U/L (ref 24–173)
CLARITY UR: CLEAR
CO2 SERPL-SCNC: 26.5 MMOL/L (ref 24.3–31.9)
COLLECT DURATION TIME UR: 24 HRS
COLOR UR: YELLOW
CREAT BLD-MCNC: 1.57 MG/DL (ref 0.43–1.29)
GFR SERPL CREATININE-BSD FRML MDRD: 34 ML/MIN/1.73
GLOBULIN UR ELPH-MCNC: 3.2 GM/DL
GLUCOSE BLD-MCNC: 240 MG/DL (ref 70–110)
GLUCOSE UR STRIP-MCNC: ABNORMAL MG/DL
HGB UR QL STRIP.AUTO: ABNORMAL
KETONES UR QL STRIP: NEGATIVE
LEUKOCYTE ESTERASE UR QL STRIP.AUTO: ABNORMAL
MICRO TOTAL PROTEIN: 21.2 MG/DL
MICROALBUMIN 24H MFR UR: 318 MG/24 HR (ref 0–29)
NITRITE UR QL STRIP: NEGATIVE
OSMOLALITY SERPL CALC.SUM OF ELEC: 282.8 MOSM/KG (ref 273–305)
PH UR STRIP.AUTO: <=5 [PH] (ref 5–8)
POTASSIUM BLD-SCNC: 4.6 MMOL/L (ref 3.5–5.3)
PROT SERPL-MCNC: 7.5 G/DL (ref 6–8)
PROT UR QL STRIP: ABNORMAL
SODIUM BLD-SCNC: 136 MMOL/L (ref 135–153)
SP GR UR STRIP: 1.02 (ref 1–1.03)
SPECIMEN VOL 24H UR: 1500 ML
UROBILINOGEN UR QL STRIP: ABNORMAL

## 2018-03-29 PROCEDURE — 81003 URINALYSIS AUTO W/O SCOPE: CPT

## 2018-03-29 PROCEDURE — 76775 US EXAM ABDO BACK WALL LIM: CPT | Performed by: RADIOLOGY

## 2018-03-29 PROCEDURE — 82550 ASSAY OF CK (CPK): CPT

## 2018-03-29 PROCEDURE — 80053 COMPREHEN METABOLIC PANEL: CPT

## 2018-03-29 PROCEDURE — 84156 ASSAY OF PROTEIN URINE: CPT

## 2018-03-29 PROCEDURE — 81050 URINALYSIS VOLUME MEASURE: CPT

## 2018-03-29 PROCEDURE — 36415 COLL VENOUS BLD VENIPUNCTURE: CPT

## 2018-03-29 PROCEDURE — 76775 US EXAM ABDO BACK WALL LIM: CPT

## 2018-04-10 ENCOUNTER — TRANSCRIBE ORDERS (OUTPATIENT)
Dept: ADMINISTRATIVE | Facility: HOSPITAL | Age: 58
End: 2018-04-10

## 2018-04-10 ENCOUNTER — HOSPITAL ENCOUNTER (OUTPATIENT)
Dept: ULTRASOUND IMAGING | Facility: HOSPITAL | Age: 58
Discharge: HOME | End: 2018-04-10
Admitting: PHYSICIAN ASSISTANT

## 2018-04-10 ENCOUNTER — HOSPITAL ENCOUNTER (OUTPATIENT)
Dept: ULTRASOUND IMAGING | Facility: HOSPITAL | Age: 58
End: 2018-04-10

## 2018-04-10 DIAGNOSIS — R10.11 RUQ PAIN: ICD-10-CM

## 2018-04-10 DIAGNOSIS — R10.11 RUQ PAIN: Primary | ICD-10-CM

## 2018-04-10 PROCEDURE — 76705 ECHO EXAM OF ABDOMEN: CPT

## 2018-04-10 PROCEDURE — 76705 ECHO EXAM OF ABDOMEN: CPT | Performed by: RADIOLOGY

## 2018-04-12 ENCOUNTER — OFFICE VISIT (OUTPATIENT)
Dept: GASTROENTEROLOGY | Facility: CLINIC | Age: 58
End: 2018-04-12

## 2018-04-12 VITALS
WEIGHT: 188.8 LBS | OXYGEN SATURATION: 94 % | DIASTOLIC BLOOD PRESSURE: 72 MMHG | HEIGHT: 67 IN | SYSTOLIC BLOOD PRESSURE: 139 MMHG | HEART RATE: 94 BPM | BODY MASS INDEX: 29.63 KG/M2

## 2018-04-12 DIAGNOSIS — K50.10 CROHN'S DISEASE OF LARGE INTESTINE WITHOUT COMPLICATION (HCC): Primary | ICD-10-CM

## 2018-04-12 DIAGNOSIS — Z11.59 ENCOUNTER FOR SCREENING FOR OTHER VIRAL DISEASES (CODE): ICD-10-CM

## 2018-04-12 DIAGNOSIS — R94.5 ABNORMAL RESULTS OF LIVER FUNCTION STUDIES: ICD-10-CM

## 2018-04-12 DIAGNOSIS — K76.0 FATTY LIVER: ICD-10-CM

## 2018-04-12 PROCEDURE — 99214 OFFICE O/P EST MOD 30 MIN: CPT | Performed by: PHYSICIAN ASSISTANT

## 2018-04-12 NOTE — PROGRESS NOTES
Chief Complaint   Patient presents with   • Crohn's Disease       Nasrin Lizama is a 57 y.o. female who presents to the office today for follow up appointment regarding Crohn's Disease.    HPI  Today, she reports that she is feeling a lot better since starting Apriso 4 tablets daily as directed by Dr. Carver at her last office visit. Her stools have decreased in frequency from 8-12 daily to 3-4 times daily. EGD and colonoscopy were performed by Dr. Carver on 2/15/2018.  She was found to have chronic gastritis associated with occasional granulomas.  Colonoscopy showed proctosigmoiditis--biopsies showed chronic colitis with occasional granulomas. She does have a history of diabetes and has increased BMI. Sister has cirrhosis. Patient denies any current or past alcohol use. Never been tested for hepatitis per her report.    Labs 3/29/2018:  Glucose 70 - 110 mg/dL 240     BUN 7 - 21 mg/dL 21    Creatinine 0.43 - 1.29 mg/dL 1.57     Sodium 135 - 153 mmol/L 136    Potassium 3.5 - 5.3 mmol/L 4.6    Chloride 99 - 112 mmol/L 105    CO2 24.3 - 31.9 mmol/L 26.5    Calcium 7.7 - 10.0 mg/dL 9.5    Total Protein 6.0 - 8.0 g/dL 7.5    Albumin 3.50 - 5.00 g/dL 4.30    ALT (SGPT) 10 - 36 U/L 50     AST (SGOT) 10 - 30 U/L 29    Alkaline Phosphatase 35 - 104 U/L 88    Comments: Note New Reference Ranges   Total Bilirubin 0.2 - 1.8 mg/dL 0.2    eGFR Non African Amer >60 mL/min/1.73 34     Globulin gm/dL 3.2    A/G Ratio 1.5 - 2.5 g/dL 1.3     BUN/Creatinine Ratio 7.0 - 25.0 13.4    Anion Gap 3.6 - 11.2 mmol/L 4.5      US liver 4/10/2018:  IMPRESSION:  Increased echogenicity throughout the liver consistent with  hepatocellular disease and fatty infiltration.    Review of Systems   Constitutional: Negative for fatigue.   HENT: Positive for trouble swallowing.    Respiratory: Negative for shortness of breath.    Cardiovascular: Negative for chest pain.   Gastrointestinal: Positive for abdominal distention, abdominal pain, anal bleeding,  blood in stool and nausea. Negative for constipation, diarrhea, rectal pain and vomiting.   Neurological: Negative for dizziness and headaches.       Specialty Problems        Gastroenterology Problems    Constipation        Irritable bowel syndrome without diarrhea        Therapeutic opioid induced constipation        Fatty liver              Past Medical History:   Diagnosis Date   • Anxiety    • Arthritis    • Back pain    • Chronic kidney disease    • COPD (chronic obstructive pulmonary disease)    • Depression    • Diabetes mellitus    • H/O colonoscopy        Past Surgical History:   Procedure Laterality Date   • APPENDECTOMY     • BREAST BIOPSY Right 2014    benign   • BREAST BIOPSY Right 2001    benign   • COLONOSCOPY  2016   • COLONOSCOPY N/A 6/22/2016    Procedure: COLONOSCOPY;  Surgeon: Junior Carver III, MD;  Location: [facility] OR;  Service:    • COLONOSCOPY N/A 2/15/2018    Procedure: COLONOSCOPY  CPTCODE:04659;  Surgeon: Junior Carver III, MD;  Location: [facility] OR;  Service:    • ENDOSCOPY N/A 6/22/2016    Procedure: ESOPHAGOGASTRODUODENOSCOPY W/ BIOPSY;  Surgeon: Junior Carver III, MD;  Location: [facility] OR;  Service:    • ENDOSCOPY N/A 2/15/2018    Procedure: ESOPHAGOGASTRODUODENOSCOPY WITH BIOPSY  CPTCODE:90281;  Surgeon: Junior Carver III, MD;  Location: [facility] OR;  Service:    • HYSTERECTOMY     • NOSE SURGERY     • TUBAL ABDOMINAL LIGATION     • UPPER GASTROINTESTINAL ENDOSCOPY         Family History   Problem Relation Age of Onset   • Heart attack Mother      acute myocard infarction   • Cancer Other      cancer and breast cancer   • Diabetes Other    • Heart disease Other    • Multiple myeloma Other    • Breast cancer Maternal Aunt    • Breast cancer Maternal Aunt        Social History   Substance Use Topics   • Smoking status: Current Every Day Smoker     Packs/day: 1.00     Years: 15.00     Types: Cigarettes   • Smokeless tobacco: Never Used   • Alcohol use No        CURRENT MEDICATION:  •  clonazePAM (KlonoPIN) 1 MG tablet, Take 1 mg by mouth 2 (two) times a day as needed for seizures., Disp: , Rfl:   •  fenofibrate (TRICOR) 145 MG tablet, Take 145 mg by mouth daily., Disp: , Rfl:   •  glimepiride (AMARYL) 4 MG tablet, Take 4 mg by mouth every morning before breakfast., Disp: , Rfl:   •  hydrOXYzine (ATARAX) 25 MG tablet, , Disp: , Rfl:   •  insulin glargine (LANTUS) 100 UNIT/ML injection, Inject  under the skin daily., Disp: , Rfl:   •  linaclotide (LINZESS) 145 MCG capsule capsule, Take 1 tablet once daily on an empty stomach at least 30 minutes prior to the first meal of the day., Disp: 30 capsule, Rfl: 5  •  linagliptin (TRADJENTA) 5 MG tablet tablet, Take 5 mg by mouth Daily., Disp: , Rfl:   •  mesalamine (APRISO) 0.375 g 24 hr capsule, Take 1 capsule by mouth 4 (Four) Times a Day., Disp: 120 capsule, Rfl: 5  •  oxyCODONE-acetaminophen (PERCOCET) 7.5-325 MG per tablet, Take 1 tablet by mouth every 6 (six) hours as needed., Disp: , Rfl:   •  pantoprazole (PROTONIX) 40 MG EC tablet, Take 1 tablet daily about 30 min before breakfast, Disp: 30 tablet, Rfl: 5  •  PARoxetine (PAXIL) 20 MG tablet, Take 1 tablet by mouth daily. As directed., Disp: , Rfl:   •  pravastatin (PRAVACHOL) 40 MG tablet, Take 1 tablet by mouth daily., Disp: , Rfl:   •  pregabalin (LYRICA) 150 MG capsule, Take 150 mg by mouth 2 (two) times a day., Disp: , Rfl:   •  promethazine (PHENERGAN) 25 MG tablet, Take 1 tablet by mouth Every 6 (Six) Hours As Needed for Nausea or Vomiting., Disp: 30 tablet, Rfl: 2  •  rosuvastatin (CRESTOR) 10 MG tablet, Take 10 mg by mouth daily., Disp: , Rfl:   •  lisinopril (PRINIVIL,ZESTRIL) 2.5 MG tablet, Take 2.5 mg by mouth daily., Disp: , Rfl:   •  metFORMIN (GLUCOPHAGE) 500 MG tablet, Take 1 tablet by mouth daily., Disp: , Rfl:   •  sitaGLIPtin (JANUVIA) 100 MG tablet, Take 100 mg by mouth daily., Disp: , Rfl:     ALLERGIES:  Review of patient's allergies indicates no  "known allergies.    VISIT VITALS:  /72   Pulse 94   Ht 170.2 cm (67\")   Wt 85.6 kg (188 lb 12.8 oz)   SpO2 94%   BMI 29.57 kg/m²     Physical Exam   Constitutional: She is oriented to person, place, and time. She appears well-developed and well-nourished. No distress.   HENT:   Head: Normocephalic and atraumatic.   Nose: Nose normal.   Mouth/Throat: Oropharynx is clear and moist.   Eyes: Conjunctivae are normal. Right eye exhibits no discharge. Left eye exhibits no discharge. No scleral icterus.   Neck: Normal range of motion. No JVD present.   Cardiovascular: Normal rate, regular rhythm and normal heart sounds.  Exam reveals no gallop and no friction rub.    No murmur heard.  Pulmonary/Chest: Effort normal and breath sounds normal. No respiratory distress. She has no wheezes. She has no rales. She exhibits no tenderness.   Abdominal: Soft. Bowel sounds are normal. She exhibits no mass. There is tenderness (generalized but worst in epigastric).   Truncal obesity   Musculoskeletal: Normal range of motion. She exhibits no edema or deformity.   Neurological: She is alert and oriented to person, place, and time. Coordination normal.   Skin: Skin is warm and dry. No rash noted. She is not diaphoretic. No erythema.   Psychiatric: She has a normal mood and affect. Her behavior is normal. Judgment and thought content normal.   Vitals reviewed.      Assessment/Plan     1. Crohn's disease of large intestine without complication    2. Fatty liver    3. Abnormal results of liver function studies     4. Encounter for screening for other viral diseases (CODE)       Orders Placed This Encounter   Procedures   • Alpha - 1 - Antitrypsin Phenotype   • Anti-Smooth Muscle Antibody Titer   • Celiac Comprehensive Panel   • Ceruloplasmin   • Ferritin   • Hepatitis A Antibody, Total   • Hepatitis B Surface Antibody   • Hepatitis Panel, Acute   • IgG, IgA, IgM   • Iron Profile   • Mitochondrial Antibodies, M2   • Nuclear Antigen " Antibody, IFA     Continue current regimen for treatment of Crohn's colitis found on last procedure. She was given information today regarding this inflammatory bowel disease. She will need CRCS every 2 years due to increased risk to develop colon cancer.     Abnormal ALT was noted on recent labs. US recently did show fatty infiltrate. She will be tested for the common liver diagnoses including; Alpha 1 antitrypsin deficiency, autoimmune hepatitis, primary biliary cirrhosis, Caleb's disease, hereditary hemochromatosis, celiac diease, active hepatitis B or C and immunity to Hepatitis A and B. Ultrasound of the liver and hepatic panel should be performed at least annually to monitor this liver disease. She was given information regarding NAFLD today as well.        Return in about 6 months (around 10/12/2018) for recheck Crohn's and fatty liver.      Electronically signed 4/12/2018 5:16 PM  Kimber Le PA-C, [facility]

## 2018-04-19 ENCOUNTER — LAB (OUTPATIENT)
Dept: LAB | Facility: HOSPITAL | Age: 58
End: 2018-04-19

## 2018-04-19 DIAGNOSIS — R94.5 ABNORMAL RESULTS OF LIVER FUNCTION STUDIES: ICD-10-CM

## 2018-04-19 DIAGNOSIS — Z11.59 ENCOUNTER FOR SCREENING FOR OTHER VIRAL DISEASES (CODE): ICD-10-CM

## 2018-04-19 DIAGNOSIS — K76.0 FATTY LIVER: ICD-10-CM

## 2018-04-19 LAB
FERRITIN SERPL-MCNC: 35 NG/ML (ref 10–290.3)
HAV IGM SERPL QL IA: NORMAL
HBV CORE IGM SERPL QL IA: NORMAL
HBV SURFACE AB SER RIA-ACNC: REACTIVE
HBV SURFACE AG SERPL QL IA: NORMAL
HCV AB SER DONR QL: NORMAL
IRON 24H UR-MRATE: 67 MCG/DL (ref 49–151)
IRON SATN MFR SERPL: 17 % (ref 15–50)
TIBC SERPL-MCNC: 404 MCG/DL (ref 241–421)

## 2018-04-19 PROCEDURE — 36415 COLL VENOUS BLD VENIPUNCTURE: CPT

## 2018-04-19 PROCEDURE — 80074 ACUTE HEPATITIS PANEL: CPT

## 2018-04-19 PROCEDURE — 86038 ANTINUCLEAR ANTIBODIES: CPT

## 2018-04-19 PROCEDURE — 82784 ASSAY IGA/IGD/IGG/IGM EACH: CPT

## 2018-04-19 PROCEDURE — 83516 IMMUNOASSAY NONANTIBODY: CPT

## 2018-04-19 PROCEDURE — 83540 ASSAY OF IRON: CPT

## 2018-04-19 PROCEDURE — 82104 ALPHA-1-ANTITRYPSIN PHENO: CPT

## 2018-04-19 PROCEDURE — 82103 ALPHA-1-ANTITRYPSIN TOTAL: CPT

## 2018-04-19 PROCEDURE — 86706 HEP B SURFACE ANTIBODY: CPT

## 2018-04-19 PROCEDURE — 83550 IRON BINDING TEST: CPT

## 2018-04-19 PROCEDURE — 82728 ASSAY OF FERRITIN: CPT

## 2018-04-19 PROCEDURE — 82390 ASSAY OF CERULOPLASMIN: CPT

## 2018-04-19 PROCEDURE — 86708 HEPATITIS A ANTIBODY: CPT

## 2018-04-21 LAB
ACTIN IGG SERPL-ACNC: 12 UNITS (ref 0–19)
CERULOPLASMIN SERPL-MCNC: 36.7 MG/DL (ref 19–39)
HAV AB SER QL IA: POSITIVE
IGA SERPL-MCNC: 230 MG/DL (ref 87–352)
IGG SERPL-MCNC: 1124 MG/DL (ref 700–1600)
IGM SERPL-MCNC: 61 MG/DL (ref 26–217)

## 2018-04-23 LAB
ANA SER QL IA: NEGATIVE
DEPRECATED MITOCHONDRIA M2 IGG SER-ACNC: <20 UNITS (ref 0–20)
ENDOMYSIUM IGA SER QL: NEGATIVE
GLIADIN PEPTIDE IGA SER-ACNC: 6 UNITS (ref 0–19)
GLIADIN PEPTIDE IGG SER-ACNC: 3 UNITS (ref 0–19)
IGA SERPL-MCNC: 224 MG/DL (ref 87–352)
TTG IGA SER-ACNC: <2 U/ML (ref 0–3)
TTG IGG SER-ACNC: <2 U/ML (ref 0–5)

## 2018-04-24 LAB
A1AT SERPL-MCNC: 162 MG/DL (ref 90–200)
PHENOTYPE: NORMAL

## 2018-06-04 ENCOUNTER — LAB (OUTPATIENT)
Dept: LAB | Facility: HOSPITAL | Age: 58
End: 2018-06-04
Attending: INTERNAL MEDICINE

## 2018-06-04 ENCOUNTER — TRANSCRIBE ORDERS (OUTPATIENT)
Dept: ADMINISTRATIVE | Facility: HOSPITAL | Age: 58
End: 2018-06-04

## 2018-06-04 DIAGNOSIS — N18.30 CHRONIC KIDNEY DISEASE, STAGE III (MODERATE) (HCC): Primary | ICD-10-CM

## 2018-06-04 DIAGNOSIS — N18.30 CHRONIC KIDNEY DISEASE, STAGE III (MODERATE) (HCC): ICD-10-CM

## 2018-06-04 LAB
ALBUMIN SERPL-MCNC: 4.5 G/DL (ref 3.4–4.8)
ALBUMIN/GLOB SERPL: 1.4 G/DL (ref 1.5–2.5)
ALP SERPL-CCNC: 79 U/L (ref 35–104)
ALT SERPL W P-5'-P-CCNC: 41 U/L (ref 10–36)
ANION GAP SERPL CALCULATED.3IONS-SCNC: 4.1 MMOL/L (ref 3.6–11.2)
AST SERPL-CCNC: 49 U/L (ref 10–30)
BACTERIA UR QL AUTO: ABNORMAL /HPF
BILIRUB SERPL-MCNC: 0.3 MG/DL (ref 0.2–1.8)
BILIRUB UR QL STRIP: NEGATIVE
BUN BLD-MCNC: 16 MG/DL (ref 7–21)
BUN/CREAT SERPL: 11.6 (ref 7–25)
CALCIUM SPEC-SCNC: 9.6 MG/DL (ref 7.7–10)
CHLORIDE SERPL-SCNC: 104 MMOL/L (ref 99–112)
CLARITY UR: CLEAR
CO2 SERPL-SCNC: 26.9 MMOL/L (ref 24.3–31.9)
COLLECT DURATION TIME UR: 24 HRS
COLOR UR: YELLOW
CREAT BLD-MCNC: 1.38 MG/DL (ref 0.43–1.29)
GFR SERPL CREATININE-BSD FRML MDRD: 39 ML/MIN/1.73
GLOBULIN UR ELPH-MCNC: 3.2 GM/DL
GLUCOSE BLD-MCNC: 196 MG/DL (ref 70–110)
GLUCOSE UR STRIP-MCNC: NEGATIVE MG/DL
HGB UR QL STRIP.AUTO: ABNORMAL
HYALINE CASTS UR QL AUTO: ABNORMAL /LPF
KETONES UR QL STRIP: NEGATIVE
LEUKOCYTE ESTERASE UR QL STRIP.AUTO: ABNORMAL
MICRO TOTAL PROTEIN: 10 MG/DL
MICROALBUMIN 24H MFR UR: 400 MG/24 HR (ref 0–29)
NITRITE UR QL STRIP: NEGATIVE
OSMOLALITY SERPL CALC.SUM OF ELEC: 276.7 MOSM/KG (ref 273–305)
PH UR STRIP.AUTO: <=5 [PH] (ref 5–8)
POTASSIUM BLD-SCNC: 4.7 MMOL/L (ref 3.5–5.3)
PROT SERPL-MCNC: 7.7 G/DL (ref 6–8)
PROT UR QL STRIP: ABNORMAL
RBC # UR: ABNORMAL /HPF
REF LAB TEST METHOD: ABNORMAL
SODIUM BLD-SCNC: 135 MMOL/L (ref 135–153)
SP GR UR STRIP: 1.01 (ref 1–1.03)
SPECIMEN VOL 24H UR: 4000 ML
SQUAMOUS #/AREA URNS HPF: ABNORMAL /HPF
UROBILINOGEN UR QL STRIP: ABNORMAL
WBC UR QL AUTO: ABNORMAL /HPF

## 2018-06-04 PROCEDURE — 84156 ASSAY OF PROTEIN URINE: CPT

## 2018-06-04 PROCEDURE — 80053 COMPREHEN METABOLIC PANEL: CPT

## 2018-06-04 PROCEDURE — 81001 URINALYSIS AUTO W/SCOPE: CPT

## 2018-06-04 PROCEDURE — 36415 COLL VENOUS BLD VENIPUNCTURE: CPT

## 2018-06-04 PROCEDURE — 81050 URINALYSIS VOLUME MEASURE: CPT

## 2018-07-05 ENCOUNTER — LAB (OUTPATIENT)
Dept: LAB | Facility: HOSPITAL | Age: 58
End: 2018-07-05
Attending: INTERNAL MEDICINE

## 2018-07-05 ENCOUNTER — TRANSCRIBE ORDERS (OUTPATIENT)
Dept: ADMINISTRATIVE | Facility: HOSPITAL | Age: 58
End: 2018-07-05

## 2018-07-05 DIAGNOSIS — N39.0 URINARY TRACT INFECTION WITHOUT HEMATURIA, SITE UNSPECIFIED: Primary | ICD-10-CM

## 2018-07-05 DIAGNOSIS — N39.0 URINARY TRACT INFECTION WITHOUT HEMATURIA, SITE UNSPECIFIED: ICD-10-CM

## 2018-07-05 LAB
BACTERIA UR QL AUTO: ABNORMAL /HPF
BILIRUB UR QL STRIP: NEGATIVE
CLARITY UR: CLEAR
COLOR UR: YELLOW
GLUCOSE UR STRIP-MCNC: NEGATIVE MG/DL
HGB UR QL STRIP.AUTO: ABNORMAL
HYALINE CASTS UR QL AUTO: ABNORMAL /LPF
KETONES UR QL STRIP: NEGATIVE
LEUKOCYTE ESTERASE UR QL STRIP.AUTO: NEGATIVE
NITRITE UR QL STRIP: NEGATIVE
PH UR STRIP.AUTO: 6.5 [PH] (ref 5–8)
PROT UR QL STRIP: NEGATIVE
RBC # UR: ABNORMAL /HPF
REF LAB TEST METHOD: ABNORMAL
SP GR UR STRIP: 1.01 (ref 1–1.03)
SQUAMOUS #/AREA URNS HPF: ABNORMAL /HPF
UROBILINOGEN UR QL STRIP: ABNORMAL
WBC UR QL AUTO: ABNORMAL /HPF

## 2018-07-05 PROCEDURE — 81001 URINALYSIS AUTO W/SCOPE: CPT

## 2018-07-16 RX ORDER — PROMETHAZINE HYDROCHLORIDE 25 MG/1
25 TABLET ORAL EVERY 6 HOURS PRN
Qty: 30 TABLET | Refills: 3 | Status: SHIPPED | OUTPATIENT
Start: 2018-07-16 | End: 2019-08-29

## 2018-07-16 RX ORDER — PROMETHAZINE HYDROCHLORIDE 25 MG/1
TABLET ORAL
Qty: 30 TABLET | Refills: 1 | OUTPATIENT
Start: 2018-07-16

## 2018-08-08 ENCOUNTER — TRANSCRIBE ORDERS (OUTPATIENT)
Dept: ADMINISTRATIVE | Facility: HOSPITAL | Age: 58
End: 2018-08-08

## 2018-08-08 DIAGNOSIS — R31.9 HEMATURIA, UNSPECIFIED TYPE: Primary | ICD-10-CM

## 2018-08-14 ENCOUNTER — HOSPITAL ENCOUNTER (OUTPATIENT)
Dept: CT IMAGING | Facility: HOSPITAL | Age: 58
Discharge: HOME | End: 2018-08-14
Attending: INTERNAL MEDICINE | Admitting: INTERNAL MEDICINE

## 2018-08-14 DIAGNOSIS — R31.9 HEMATURIA, UNSPECIFIED TYPE: ICD-10-CM

## 2018-08-14 PROCEDURE — 74176 CT ABD & PELVIS W/O CONTRAST: CPT

## 2018-08-14 PROCEDURE — 74176 CT ABD & PELVIS W/O CONTRAST: CPT | Performed by: RADIOLOGY

## 2018-08-17 ENCOUNTER — OFFICE VISIT (OUTPATIENT)
Dept: UROLOGY | Facility: CLINIC | Age: 58
End: 2018-08-17

## 2018-08-17 VITALS — WEIGHT: 188 LBS | BODY MASS INDEX: 29.51 KG/M2 | HEIGHT: 67 IN

## 2018-08-17 DIAGNOSIS — R33.9 INCOMPLETE BLADDER EMPTYING: ICD-10-CM

## 2018-08-17 DIAGNOSIS — R31.29 MICROSCOPIC HEMATURIA: Primary | ICD-10-CM

## 2018-08-17 LAB
BILIRUB BLD-MCNC: NEGATIVE MG/DL
CLARITY, POC: CLEAR
COLOR UR: YELLOW
GLUCOSE UR STRIP-MCNC: NEGATIVE MG/DL
KETONES UR QL: NEGATIVE
LEUKOCYTE EST, POC: NEGATIVE
NITRITE UR-MCNC: NEGATIVE MG/ML
PH UR: 6 [PH] (ref 5–8)
PROT UR STRIP-MCNC: NEGATIVE MG/DL
RBC # UR STRIP: ABNORMAL /UL
SP GR UR: 1.01 (ref 1–1.03)
UROBILINOGEN UR QL: NORMAL

## 2018-08-17 PROCEDURE — 51798 US URINE CAPACITY MEASURE: CPT | Performed by: UROLOGY

## 2018-08-17 PROCEDURE — 99203 OFFICE O/P NEW LOW 30 MIN: CPT | Performed by: UROLOGY

## 2018-08-17 PROCEDURE — 81003 URINALYSIS AUTO W/O SCOPE: CPT | Performed by: UROLOGY

## 2018-08-17 RX ORDER — INSULIN ASPART 100 [IU]/ML
INJECTION, SOLUTION INTRAVENOUS; SUBCUTANEOUS
COMMUNITY
Start: 2018-08-09

## 2018-08-17 NOTE — PROGRESS NOTES
Chief Complaint:          Microscopic hematuria    HPI:   58 y.o. female.  Pt creat 1.57 and 1.38.  She had ct scan without contrast because her creat was too elevated. Her ct scan did not show any hydronephrosis or stones or masses.  Pt denies hx of stones or infections.  Pt's post void bladder scan was 0 today.  HPI      Past Medical History:        Past Medical History:   Diagnosis Date   • Anxiety    • Arthritis    • Back pain    • Chronic kidney disease    • COPD (chronic obstructive pulmonary disease) (CMS/HCC)    • Depression    • Diabetes mellitus (CMS/HCC)    • H/O colonoscopy          Current Meds:     Current Outpatient Prescriptions   Medication Sig Dispense Refill   • clonazePAM (KlonoPIN) 1 MG tablet Take 1 mg by mouth 2 (two) times a day as needed for seizures.     • fenofibrate (TRICOR) 145 MG tablet Take 145 mg by mouth daily.     • glimepiride (AMARYL) 4 MG tablet Take 4 mg by mouth every morning before breakfast.     • hydrOXYzine (ATARAX) 25 MG tablet      • insulin glargine (LANTUS) 100 UNIT/ML injection Inject  under the skin daily.     • linagliptin (TRADJENTA) 5 MG tablet tablet Take 5 mg by mouth Daily.     • mesalamine (APRISO) 0.375 g 24 hr capsule Take 1 capsule by mouth 4 (Four) Times a Day. 120 capsule 5   • NOVOLOG FLEXPEN 100 UNIT/ML solution pen-injector sc pen      • oxyCODONE-acetaminophen (PERCOCET) 7.5-325 MG per tablet Take 1 tablet by mouth every 6 (six) hours as needed.     • pantoprazole (PROTONIX) 40 MG EC tablet Take 1 tablet daily about 30 min before breakfast 30 tablet 5   • PARoxetine (PAXIL) 20 MG tablet Take 1 tablet by mouth daily. As directed.     • pravastatin (PRAVACHOL) 40 MG tablet Take 1 tablet by mouth daily.     • pregabalin (LYRICA) 150 MG capsule Take 150 mg by mouth 2 (two) times a day.     • promethazine (PHENERGAN) 25 MG tablet Take 1 tablet by mouth Every 6 (Six) Hours As Needed for Nausea or Vomiting. 30 tablet 3   • rosuvastatin (CRESTOR) 10 MG tablet  Take 10 mg by mouth daily.     • linaclotide (LINZESS) 145 MCG capsule capsule Take 1 tablet once daily on an empty stomach at least 30 minutes prior to the first meal of the day. 30 capsule 5   • lisinopril (PRINIVIL,ZESTRIL) 2.5 MG tablet Take 2.5 mg by mouth daily.     • metFORMIN (GLUCOPHAGE) 500 MG tablet Take 1 tablet by mouth daily.     • sitaGLIPtin (JANUVIA) 100 MG tablet Take 100 mg by mouth daily.       No current facility-administered medications for this visit.         Allergies:      No Known Allergies     Past Surgical History:     Past Surgical History:   Procedure Laterality Date   • APPENDECTOMY     • BREAST BIOPSY Right 2014    benign   • BREAST BIOPSY Right 2001    benign   • COLONOSCOPY  2016   • COLONOSCOPY N/A 6/22/2016    Procedure: COLONOSCOPY;  Surgeon: Junior Carver III, MD;  Location: [facility] OR;  Service:    • COLONOSCOPY N/A 2/15/2018    Procedure: COLONOSCOPY  CPTCODE:88111;  Surgeon: Junior Carver III, MD;  Location: [facility] OR;  Service:    • ENDOSCOPY N/A 6/22/2016    Procedure: ESOPHAGOGASTRODUODENOSCOPY W/ BIOPSY;  Surgeon: Junior Carver III, MD;  Location: [facility] OR;  Service:    • ENDOSCOPY N/A 2/15/2018    Procedure: ESOPHAGOGASTRODUODENOSCOPY WITH BIOPSY  CPTCODE:36741;  Surgeon: Junior Carver III, MD;  Location: [facility];  Service:    • HYSTERECTOMY     • NOSE SURGERY     • TUBAL ABDOMINAL LIGATION     • UPPER GASTROINTESTINAL ENDOSCOPY           Social History:     Social History     Social History   • Marital status:      Spouse name: N/A   • Number of children: N/A   • Years of education: N/A     Occupational History   • Not on file.     Social History Main Topics   • Smoking status: Current Every Day Smoker     Packs/day: 1.00     Years: 15.00     Types: Cigarettes   • Smokeless tobacco: Never Used   • Alcohol use No   • Drug use: No   • Sexual activity: Defer     Other Topics Concern   • Not on file     Social History Narrative   • No  narrative on file       Family History:     Family History   Problem Relation Age of Onset   • Heart attack Mother         acute myocard infarction   • Cancer Other         cancer and breast cancer   • Diabetes Other    • Heart disease Other    • Multiple myeloma Other    • Breast cancer Maternal Aunt    • Breast cancer Maternal Aunt    • Cancer Father    • Multiple myeloma Father        Review of Systems:     Review of Systems   Constitutional: Negative for chills, fatigue and fever.   Respiratory: Positive for cough, shortness of breath and wheezing.    Gastrointestinal: Positive for nausea. Negative for abdominal pain and vomiting.   Genitourinary: Positive for hematuria.   Musculoskeletal: Positive for back pain and joint swelling.   Neurological: Positive for headaches. Negative for dizziness.   Psychiatric/Behavioral: Negative for confusion.       I have reviewed the follow portions of the patient's history and confirmed they are accurate today:  allergies, current medications, past family history, past medical history, past social history, past surgical history, problem list and ROS  Physical Exam:     Physical Exam   Constitutional: She is oriented to person, place, and time. She appears well-developed.   HENT:   Head: Normocephalic.   Right Ear: External ear normal.   Left Ear: External ear normal.   Nose: Nose normal.   Mouth/Throat: Oropharynx is clear and moist.   Eyes: Pupils are equal, round, and reactive to light. Conjunctivae and EOM are normal.   Neck: Normal range of motion. Neck supple. No tracheal deviation present. No thyromegaly present.   Cardiovascular: Normal heart sounds.  Exam reveals no gallop and no friction rub.    No murmur heard.  Pulmonary/Chest: Effort normal and breath sounds normal. No respiratory distress. She has no wheezes. She has no rales. She exhibits no tenderness.   Changes of COPD   Abdominal: Soft. Bowel sounds are normal. She exhibits no distension and no mass. There is  "no tenderness. There is no rebound and no guarding. No hernia.   Genitourinary: Vagina normal.   Musculoskeletal: Normal range of motion. She exhibits no edema.   Lymphadenopathy:     She has no cervical adenopathy.   Neurological: She is alert and oriented to person, place, and time. She displays normal reflexes. No cranial nerve deficit. She exhibits normal muscle tone. Coordination normal.   Skin: Skin is warm. No rash noted.   Psychiatric: She has a normal mood and affect. Judgment normal.       Ht 170.2 cm (67.01\")   Wt 85.3 kg (188 lb)   BMI 29.44 kg/m²    Procedure:         Assessment:     Encounter Diagnoses   Name Primary?   • Microscopic hematuria Yes   • Incomplete bladder emptying      Orders Placed This Encounter   Procedures   • Bladder Scan   • POC Urinalysis Dipstick, Automated       Plan:   Pt and I discussed the work up of microscopic hemaoturia    Patient's Body mass index is 29.44 kg/m². BMI is within normal parameters. No follow-up required.      Counseling was given to patient for the following topics impressions as follows: microhematuria. The interim medical history and current results were reviewed.  A treatment plan with follow-up was made for Microscopic hematuria [R31.29].This document has been electronically signed by Jarrod Vila MD August 17, 2018 3:21 PM  "

## 2018-09-26 ENCOUNTER — PROCEDURE VISIT (OUTPATIENT)
Dept: UROLOGY | Facility: CLINIC | Age: 58
End: 2018-09-26

## 2018-09-26 VITALS — WEIGHT: 188 LBS | BODY MASS INDEX: 29.51 KG/M2 | HEIGHT: 67 IN

## 2018-09-26 DIAGNOSIS — R31.29 MICROSCOPIC HEMATURIA: ICD-10-CM

## 2018-09-26 DIAGNOSIS — R31.29 MICROHEMATURIA: Primary | ICD-10-CM

## 2018-09-26 LAB
BILIRUB BLD-MCNC: NEGATIVE MG/DL
CLARITY, POC: CLEAR
COLOR UR: YELLOW
GLUCOSE UR STRIP-MCNC: NEGATIVE MG/DL
KETONES UR QL: NEGATIVE
LEUKOCYTE EST, POC: NEGATIVE
NITRITE UR-MCNC: NEGATIVE MG/ML
PH UR: 5 [PH] (ref 5–8)
PROT UR STRIP-MCNC: NEGATIVE MG/DL
RBC # UR STRIP: ABNORMAL /UL
SP GR UR: 1.02 (ref 1–1.03)
UROBILINOGEN UR QL: NORMAL

## 2018-09-26 PROCEDURE — 52000 CYSTOURETHROSCOPY: CPT | Performed by: UROLOGY

## 2018-09-26 PROCEDURE — 81002 URINALYSIS NONAUTO W/O SCOPE: CPT | Performed by: UROLOGY

## 2018-09-26 NOTE — PROGRESS NOTES
Chief Complaint:          microhematuria    HPI:   58 y.o. female.  Pt is asymptomatic.  Her ct scan done in august did not show any stones or masses  HPI      Past Medical History:        Past Medical History:   Diagnosis Date   • Anxiety    • Arthritis    • Back pain    • Chronic kidney disease    • COPD (chronic obstructive pulmonary disease) (CMS/[facility])    • Depression    • Diabetes mellitus (CMS/HCC)    • H/O colonoscopy          Current Meds:     Current Outpatient Prescriptions   Medication Sig Dispense Refill   • clonazePAM (KlonoPIN) 1 MG tablet Take 1 mg by mouth 2 (two) times a day as needed for seizures.     • fenofibrate (TRICOR) 145 MG tablet Take 145 mg by mouth daily.     • glimepiride (AMARYL) 4 MG tablet Take 4 mg by mouth every morning before breakfast.     • hydrOXYzine (ATARAX) 25 MG tablet      • insulin glargine (LANTUS) 100 UNIT/ML injection Inject  under the skin daily.     • linaclotide (LINZESS) 145 MCG capsule capsule Take 1 tablet once daily on an empty stomach at least 30 minutes prior to the first meal of the day. 30 capsule 5   • linagliptin (TRADJENTA) 5 MG tablet tablet Take 5 mg by mouth Daily.     • lisinopril (PRINIVIL,ZESTRIL) 2.5 MG tablet Take 2.5 mg by mouth daily.     • mesalamine (APRISO) 0.375 g 24 hr capsule Take 1 capsule by mouth 4 (Four) Times a Day. 120 capsule 5   • metFORMIN (GLUCOPHAGE) 500 MG tablet Take 1 tablet by mouth daily.     • NOVOLOG FLEXPEN 100 UNIT/ML solution pen-injector sc pen      • oxyCODONE-acetaminophen (PERCOCET) 7.5-325 MG per tablet Take 1 tablet by mouth every 6 (six) hours as needed.     • pantoprazole (PROTONIX) 40 MG EC tablet Take 1 tablet daily about 30 min before breakfast 30 tablet 5   • PARoxetine (PAXIL) 20 MG tablet Take 1 tablet by mouth daily. As directed.     • pravastatin (PRAVACHOL) 40 MG tablet Take 1 tablet by mouth daily.     • pregabalin (LYRICA) 150 MG capsule Take 150 mg by mouth 2 (two) times a day.     • promethazine  (PHENERGAN) 25 MG tablet Take 1 tablet by mouth Every 6 (Six) Hours As Needed for Nausea or Vomiting. 30 tablet 3   • rosuvastatin (CRESTOR) 10 MG tablet Take 10 mg by mouth daily.     • sitaGLIPtin (JANUVIA) 100 MG tablet Take 100 mg by mouth daily.       No current facility-administered medications for this visit.         Allergies:      No Known Allergies     Past Surgical History:     Past Surgical History:   Procedure Laterality Date   • APPENDECTOMY     • BREAST BIOPSY Right 2014    benign   • BREAST BIOPSY Right 2001    benign   • COLONOSCOPY  2016   • COLONOSCOPY N/A 6/22/2016    Procedure: COLONOSCOPY;  Surgeon: Junior Carver III, MD;  Location: [facility] OR;  Service:    • COLONOSCOPY N/A 2/15/2018    Procedure: COLONOSCOPY  CPTCODE:79077;  Surgeon: Junior Carver III, MD;  Location: [facility] OR;  Service:    • ENDOSCOPY N/A 6/22/2016    Procedure: ESOPHAGOGASTRODUODENOSCOPY W/ BIOPSY;  Surgeon: Junior Carver III, MD;  Location: [facility] OR;  Service:    • ENDOSCOPY N/A 2/15/2018    Procedure: ESOPHAGOGASTRODUODENOSCOPY WITH BIOPSY  CPTCODE:59660;  Surgeon: Junior Carver III, MD;  Location: [facility] OR;  Service:    • HYSTERECTOMY     • NOSE SURGERY     • TUBAL ABDOMINAL LIGATION     • UPPER GASTROINTESTINAL ENDOSCOPY           Social History:     Social History     Social History   • Marital status:      Spouse name: N/A   • Number of children: N/A   • Years of education: N/A     Occupational History   • Not on file.     Social History Main Topics   • Smoking status: Current Every Day Smoker     Packs/day: 1.00     Years: 15.00     Types: Cigarettes   • Smokeless tobacco: Never Used   • Alcohol use No   • Drug use: No   • Sexual activity: Defer     Other Topics Concern   • Not on file     Social History Narrative   • No narrative on file       Family History:     Family History   Problem Relation Age of Onset   • Heart attack Mother         acute myocard infarction   • Cancer Other   "       cancer and breast cancer   • Diabetes Other    • Heart disease Other    • Multiple myeloma Other    • Breast cancer Maternal Aunt    • Breast cancer Maternal Aunt    • Cancer Father    • Multiple myeloma Father        Review of Systems:     Review of Systems   Constitutional: Negative for fatigue.   HENT: Negative for sinus pain.    Eyes: Negative for pain.   Respiratory: Negative for chest tightness.    Cardiovascular: Negative for chest pain.   Gastrointestinal: Negative for abdominal pain.   Endocrine: Negative for heat intolerance.   Genitourinary: Negative for frequency.   Musculoskeletal: Negative for back pain.   Skin: Negative for rash.   Allergic/Immunologic: Negative for food allergies.   Neurological: Negative for headaches.   Psychiatric/Behavioral: The patient is not nervous/anxious.        I have reviewed the follow portions of the patient's history and confirmed they are accurate today:  allergies, current medications, past family history, past medical history, past social history, past surgical history, problem list and ROS  Physical Exam:     Physical Exam    Ht 170.2 cm (67.01\")   Wt 85.3 kg (188 lb)   BMI 29.44 kg/m²    Procedure:     Procedure: Cystoscopy Female    Indication: microhematuria    Urinalysis Performed Today:  Negative for Infection    Informed Consent Obtained    Sterile prep performed in usual fashion    6 cc of topical lidocaine inserted urethrally    Cystoscope inserted and bladder examined    Findings: normal: Urethra without lesions, Bladder mucosa without tumors or lesions, No stones seen, ureteral orifices are in orthotopic position and size.    Blue light was utilized with his cystoscopy and no further abnormalities were noted in that light.    Additional Procedure with Cystoscopy: none      Assessment:     Encounter Diagnoses   Name Primary?   • Microhematuria Yes   • Microscopic hematuria      Orders Placed This Encounter   Procedures   • POC Urinalysis Dipstick "       Plan:   Her cystoscopy and CT scan were negative we'll see her back in a year  This document has been electronically signed by Jarrod Vila MD September 26, 2018 8:56 AM

## 2018-10-04 ENCOUNTER — TRANSCRIBE ORDERS (OUTPATIENT)
Dept: ADMINISTRATIVE | Facility: HOSPITAL | Age: 58
End: 2018-10-04

## 2018-10-04 ENCOUNTER — LAB (OUTPATIENT)
Dept: LAB | Facility: HOSPITAL | Age: 58
End: 2018-10-04
Attending: INTERNAL MEDICINE

## 2018-10-04 DIAGNOSIS — N18.30 CHRONIC KIDNEY DISEASE, STAGE III (MODERATE) (HCC): Primary | ICD-10-CM

## 2018-10-04 DIAGNOSIS — N18.30 CHRONIC KIDNEY DISEASE, STAGE III (MODERATE) (HCC): ICD-10-CM

## 2018-10-04 LAB
ALBUMIN SERPL-MCNC: 4.5 G/DL (ref 3.4–4.8)
ALBUMIN/GLOB SERPL: 1.5 G/DL (ref 1.5–2.5)
ALP SERPL-CCNC: 76 U/L (ref 35–104)
ALT SERPL W P-5'-P-CCNC: 35 U/L (ref 10–36)
ANION GAP SERPL CALCULATED.3IONS-SCNC: 2.5 MMOL/L (ref 3.6–11.2)
AST SERPL-CCNC: 36 U/L (ref 10–30)
BACTERIA UR QL AUTO: ABNORMAL /HPF
BILIRUB SERPL-MCNC: 0.4 MG/DL (ref 0.2–1.8)
BILIRUB UR QL STRIP: NEGATIVE
BUN BLD-MCNC: 16 MG/DL (ref 7–21)
BUN/CREAT SERPL: 10.8 (ref 7–25)
CALCIUM SPEC-SCNC: 9.2 MG/DL (ref 7.7–10)
CHLORIDE SERPL-SCNC: 106 MMOL/L (ref 99–112)
CLARITY UR: CLEAR
CO2 SERPL-SCNC: 26.5 MMOL/L (ref 24.3–31.9)
COLOR UR: YELLOW
CREAT BLD-MCNC: 1.48 MG/DL (ref 0.43–1.29)
CREAT UR-MCNC: 69 MG/DL
GFR SERPL CREATININE-BSD FRML MDRD: 36 ML/MIN/1.73
GLOBULIN UR ELPH-MCNC: 3.1 GM/DL
GLUCOSE BLD-MCNC: 175 MG/DL (ref 70–110)
GLUCOSE UR STRIP-MCNC: NEGATIVE MG/DL
HGB UR QL STRIP.AUTO: ABNORMAL
HYALINE CASTS UR QL AUTO: ABNORMAL /LPF
KETONES UR QL STRIP: NEGATIVE
LEUKOCYTE ESTERASE UR QL STRIP.AUTO: ABNORMAL
NITRITE UR QL STRIP: NEGATIVE
OSMOLALITY SERPL CALC.SUM OF ELEC: 275.5 MOSM/KG (ref 273–305)
PH UR STRIP.AUTO: <=5 [PH] (ref 5–8)
POTASSIUM BLD-SCNC: 4.4 MMOL/L (ref 3.5–5.3)
PROT SERPL-MCNC: 7.6 G/DL (ref 6–8)
PROT UR QL STRIP: ABNORMAL
PROT UR-MCNC: 23.3 MG/DL
PROT/CREAT UR: 337.7 MG/G CREA (ref 0–200)
RBC # UR: ABNORMAL /HPF
REF LAB TEST METHOD: ABNORMAL
SODIUM BLD-SCNC: 135 MMOL/L (ref 135–153)
SP GR UR STRIP: 1.01 (ref 1–1.03)
SQUAMOUS #/AREA URNS HPF: ABNORMAL /HPF
UROBILINOGEN UR QL STRIP: ABNORMAL
WBC UR QL AUTO: ABNORMAL /HPF

## 2018-10-04 PROCEDURE — 81001 URINALYSIS AUTO W/SCOPE: CPT

## 2018-10-04 PROCEDURE — 82570 ASSAY OF URINE CREATININE: CPT

## 2018-10-04 PROCEDURE — 84156 ASSAY OF PROTEIN URINE: CPT

## 2018-10-04 PROCEDURE — 80053 COMPREHEN METABOLIC PANEL: CPT

## 2018-10-04 PROCEDURE — 36415 COLL VENOUS BLD VENIPUNCTURE: CPT

## 2018-10-11 ENCOUNTER — OFFICE VISIT (OUTPATIENT)
Dept: GASTROENTEROLOGY | Facility: CLINIC | Age: 58
End: 2018-10-11

## 2018-10-11 VITALS
DIASTOLIC BLOOD PRESSURE: 75 MMHG | WEIGHT: 189.6 LBS | SYSTOLIC BLOOD PRESSURE: 130 MMHG | HEIGHT: 67 IN | BODY MASS INDEX: 29.76 KG/M2 | OXYGEN SATURATION: 91 %

## 2018-10-11 DIAGNOSIS — K75.81 NASH (NONALCOHOLIC STEATOHEPATITIS): ICD-10-CM

## 2018-10-11 DIAGNOSIS — K21.9 GASTROESOPHAGEAL REFLUX DISEASE, ESOPHAGITIS PRESENCE NOT SPECIFIED: ICD-10-CM

## 2018-10-11 DIAGNOSIS — K59.04 CHRONIC IDIOPATHIC CONSTIPATION: ICD-10-CM

## 2018-10-11 DIAGNOSIS — K50.10 CROHN'S DISEASE OF COLON WITHOUT COMPLICATION (HCC): Primary | ICD-10-CM

## 2018-10-11 PROCEDURE — 99214 OFFICE O/P EST MOD 30 MIN: CPT | Performed by: PHYSICIAN ASSISTANT

## 2018-10-11 RX ORDER — PANTOPRAZOLE SODIUM 20 MG/1
20 TABLET, DELAYED RELEASE ORAL DAILY
Qty: 30 TABLET | Refills: 5 | Status: SHIPPED | OUTPATIENT
Start: 2018-10-11

## 2018-10-11 RX ORDER — MESALAMINE 0.38 G/1
CAPSULE, EXTENDED RELEASE ORAL
Qty: 120 CAPSULE | Refills: 5 | Status: SHIPPED | OUTPATIENT
Start: 2018-10-11 | End: 2019-08-29

## 2018-10-11 NOTE — PROGRESS NOTES
": 1960    Chief Complaint   Patient presents with   • Crohn's Disease       Nasrin Lizama is a 58 y.o. female who presents to the office today as a follow up appointment regarding Crohn's Disease and elevated liver enzymes.    History of Present Illness:  Since starting the Apriso, she has noticed about 50% improvement in her symptoms. She will have 5-7 stools per day which are sometimes only a very small amount of \"sand\" like stools. She will sometimes have bloody stools after. Increased abdominal gas with it. She has fecal urgency with intermittent fecal incontinence. Feels as if she does have incomplete defecation. She has taken medications for constipation in the past but they were discontinued over the past 1 year. Also reports for follow up of fatty liver disease. She does have a history of diabetes and has increased BMI. Sister has cirrhosis. Patient denies any current or past alcohol use. Had labs at last visit to further evaluate her elevated liver enzymes. She takes Protonix 40 mg once daily with good control of GERD currently but does have CKD.      Labs 2018:  Ferritin normal  Iron profile normal  Ancu-4-ghyskptmztk normal  Anti-smooth muscle antibody normal  Anti-nuclear antibody normal  Anti-mitochondrial antibody normal  Ceruloplasmin normal  Celiac panel negative  Hep B surf Ab positive  Hep A total Ab positive  Acute hepatitis panel negative  IgG, IgA, IgM normal    EGD and colonoscopy were performed by Dr. Carver on 2/15/2018.  She was found to have chronic gastritis associated with occasional granulomas.  Colonoscopy showed proctosigmoiditis--biopsies showed chronic colitis with occasional granulomas.     Previous labs 3/29/2018:  ALT (SGPT) 10 - 36 U/L 50     AST (SGOT) 10 - 30 U/L 29    Alkaline Phosphatase 35 - 104 U/L 88    Comments: Note New Reference Ranges   Total Bilirubin 0.2 - 1.8 mg/dL 0.2    eGFR Non African Amer >60 mL/min/1.73 34       US liver 4/10/2018:  Increased " echogenicity throughout the liver consistent with  hepatocellular disease and fatty infiltration.    Review of Systems   Constitutional: Negative for appetite change, chills, fatigue, fever and unexpected weight change.   HENT: Negative for trouble swallowing.    Eyes: Negative.    Respiratory: Negative for cough, choking, chest tightness and shortness of breath.    Cardiovascular: Negative for chest pain.   Gastrointestinal: Positive for abdominal distention, anal bleeding, blood in stool, constipation and diarrhea. Negative for abdominal pain, nausea, rectal pain and vomiting.   Endocrine: Negative.    Genitourinary: Negative for difficulty urinating.   Musculoskeletal: Positive for back pain. Negative for neck pain.   Skin: Positive for rash. Negative for color change and wound.   Allergic/Immunologic: Negative for environmental allergies.   Neurological: Negative for dizziness and headaches.   Hematological: Does not bruise/bleed easily.   Psychiatric/Behavioral: The patient is nervous/anxious.        Past Medical History:   Diagnosis Date   • Anxiety    • Arthritis    • Back pain    • Chronic kidney disease    • COPD (chronic obstructive pulmonary disease) (CMS/HCC)    • Crohn's disease (CMS/HCC)    • Depression    • Diabetes mellitus (CMS/HCC)    • H/O colonoscopy        Past Surgical History:   Procedure Laterality Date   • APPENDECTOMY     • BREAST BIOPSY Right 2014    benign   • BREAST BIOPSY Right 2001    benign   • COLONOSCOPY  2016   • COLONOSCOPY N/A 6/22/2016    Procedure: COLONOSCOPY;  Surgeon: Junior Carver III, MD;  Location: [facility] OR;  Service:    • COLONOSCOPY N/A 2/15/2018    Procedure: COLONOSCOPY  CPTCODE:56149;  Surgeon: Junior Carver III, MD;  Location: [facility] OR;  Service:    • ENDOSCOPY N/A 6/22/2016    Procedure: ESOPHAGOGASTRODUODENOSCOPY W/ BIOPSY;  Surgeon: Junior Carver III, MD;  Location: [facility] OR;  Service:    • ENDOSCOPY N/A 2/15/2018    Procedure:  ESOPHAGOGASTRODUODENOSCOPY WITH BIOPSY  CPTCODE:38702;  Surgeon: Junior Carver III, MD;  Location: [facility];  Service:    • HYSTERECTOMY     • NOSE SURGERY     • TUBAL ABDOMINAL LIGATION     • UPPER GASTROINTESTINAL ENDOSCOPY         Family History   Problem Relation Age of Onset   • Heart attack Mother         acute myocard infarction   • Cancer Other         cancer and breast cancer   • Diabetes Other    • Heart disease Other    • Multiple myeloma Other    • Breast cancer Maternal Aunt    • Breast cancer Maternal Aunt    • Cancer Father    • Multiple myeloma Father        Social History     Social History   • Marital status:      Social History Main Topics   • Smoking status: Current Every Day Smoker     Packs/day: 1.00     Years: 15.00     Types: Cigarettes   • Smokeless tobacco: Never Used   • Alcohol use No   • Drug use: No   • Sexual activity: Defer     Other Topics Concern   • Not on file       Current Outpatient Prescriptions:   •  clonazePAM (KlonoPIN) 1 MG tablet, Take 1 mg by mouth 2 (two) times a day as needed for seizures., Disp: , Rfl:   •  fenofibrate (TRICOR) 145 MG tablet, Take 145 mg by mouth daily., Disp: , Rfl:   •  glimepiride (AMARYL) 4 MG tablet, Take 4 mg by mouth every morning before breakfast., Disp: , Rfl:   •  hydrOXYzine (ATARAX) 25 MG tablet, , Disp: , Rfl:   •  insulin glargine (LANTUS) 100 UNIT/ML injection, Inject  under the skin daily., Disp: , Rfl:   •  linagliptin (TRADJENTA) 5 MG tablet tablet, Take 5 mg by mouth Daily., Disp: , Rfl:   •  mesalamine (APRISO) 0.375 g 24 hr capsule, Take 1 capsule by mouth 4 (Four) Times a Day., Disp: 120 capsule, Rfl: 5  •  NOVOLOG FLEXPEN 100 UNIT/ML solution pen-injector sc pen, , Disp: , Rfl:   •  oxyCODONE-acetaminophen (PERCOCET) 7.5-325 MG per tablet, Take 1 tablet by mouth every 6 (six) hours as needed., Disp: , Rfl:   •  pantoprazole (PROTONIX) 40 MG EC tablet, Take 1 tablet daily about 30 min before breakfast, Disp: 30  "tablet, Rfl: 5  •  PARoxetine (PAXIL) 20 MG tablet, Take 1 tablet by mouth daily. As directed., Disp: , Rfl:   •  pravastatin (PRAVACHOL) 40 MG tablet, Take 1 tablet by mouth daily., Disp: , Rfl:   •  pregabalin (LYRICA) 150 MG capsule, Take 150 mg by mouth 2 (two) times a day., Disp: , Rfl:   •  promethazine (PHENERGAN) 25 MG tablet, Take 1 tablet by mouth Every 6 (Six) Hours As Needed for Nausea or Vomiting., Disp: 30 tablet, Rfl: 3  •  rosuvastatin (CRESTOR) 10 MG tablet, Take 10 mg by mouth daily., Disp: , Rfl:     Allergies:   Patient has no known allergies.    Vitals:  /75 (BP Location: Right arm, Patient Position: Sitting, Cuff Size: Adult)   Ht 170.2 cm (67\")   Wt 86 kg (189 lb 9.6 oz)   SpO2 91%   BMI 29.70 kg/m²     Physical Exam   Constitutional: She is oriented to person, place, and time. She appears well-developed and well-nourished. No distress.   HENT:   Head: Normocephalic and atraumatic.   Nose: Nose normal.   Mouth/Throat: Oropharynx is clear and moist.   Eyes: Conjunctivae are normal. Right eye exhibits no discharge. Left eye exhibits no discharge. No scleral icterus.   Neck: Normal range of motion. No JVD present.   Cardiovascular: Normal rate, regular rhythm and normal heart sounds.  Exam reveals no gallop and no friction rub.    No murmur heard.  Pulmonary/Chest: Effort normal and breath sounds normal. No respiratory distress. She has no wheezes. She has no rales. She exhibits no tenderness.   Abdominal: Soft. Bowel sounds are normal. She exhibits no mass. There is tenderness (generalized but worst in epigastric).   Truncal obesity   Musculoskeletal: Normal range of motion. She exhibits no edema or deformity.   Neurological: She is alert and oriented to person, place, and time. Coordination normal.   Skin: Skin is warm and dry. No rash noted. She is not diaphoretic. No erythema.   Psychiatric: She has a normal mood and affect. Her behavior is normal. Judgment and thought content " normal.   Vitals reviewed.      Assessment/Plan:  1. Crohn's disease of colon without complication (CMS/HCC)    2. Chronic idiopathic constipation    3. HERRERA (nonalcoholic steatohepatitis)    4. Gastroesophageal reflux disease, esophagitis presence not specified      Due to Crohn's disease of the large intestine, she will continue taking Apriso as directed. She also has constipation which complicates her IBD. She will start taking Trulance for relief of chronic constipation and samples were given today. For HERRERA, we discussed that all other liver testing is negative. She is immune to Hep A and B. She should have monitoring of her fatty liver every 6 months with hepatic panel and US liver. She will work toward achieving healthy BMI and be careful with her glucose and cholesterol. I have asked her to try decreasing PPI dose to eventually taper off due to CKD and she is agreeable. She will decrease Protonix from 40 mg once daily to 20 mg once daily for treatment of GERD.     New Medications Ordered This Visit   Medications   • Plecanatide 3 MG tablet     Sig: Take 3 mg by mouth Daily.     Dispense:  30 tablet     Refill:  5   • pantoprazole (PROTONIX) 20 MG EC tablet     Sig: Take 1 tablet by mouth Daily.     Dispense:  30 tablet     Refill:  5   • mesalamine (APRISO) 0.375 g 24 hr capsule     Sig: Take 4 capsules once daily     Dispense:  120 capsule     Refill:  5           Return in about 6 weeks (around 11/22/2018) for recheck constipation.      Electronically signed 10/24/2018 4:59 PM  Kimber Le PA-C, [facility]

## 2018-11-06 ENCOUNTER — TRANSCRIBE ORDERS (OUTPATIENT)
Dept: LAB | Facility: HOSPITAL | Age: 58
End: 2018-11-06

## 2018-11-06 ENCOUNTER — LAB (OUTPATIENT)
Dept: LAB | Facility: HOSPITAL | Age: 58
End: 2018-11-06

## 2018-11-06 DIAGNOSIS — I10 BENIGN ESSENTIAL HYPERTENSION: ICD-10-CM

## 2018-11-06 DIAGNOSIS — I10 BENIGN ESSENTIAL HYPERTENSION: Primary | ICD-10-CM

## 2018-11-06 LAB
ALBUMIN SERPL-MCNC: 4.4 G/DL (ref 3.4–4.8)
ALBUMIN/GLOB SERPL: 1.5 G/DL (ref 1.5–2.5)
ALP SERPL-CCNC: 70 U/L (ref 35–104)
ALT SERPL W P-5'-P-CCNC: 51 U/L (ref 10–36)
ANION GAP SERPL CALCULATED.3IONS-SCNC: 1.7 MMOL/L (ref 3.6–11.2)
AST SERPL-CCNC: 62 U/L (ref 10–30)
BILIRUB SERPL-MCNC: 0.4 MG/DL (ref 0.2–1.8)
BUN BLD-MCNC: 14 MG/DL (ref 7–21)
BUN/CREAT SERPL: 10 (ref 7–25)
CALCIUM SPEC-SCNC: 9.5 MG/DL (ref 7.7–10)
CHLORIDE SERPL-SCNC: 108 MMOL/L (ref 99–112)
CHOLEST SERPL-MCNC: 100 MG/DL (ref 0–200)
CO2 SERPL-SCNC: 27.3 MMOL/L (ref 24.3–31.9)
CREAT BLD-MCNC: 1.4 MG/DL (ref 0.43–1.29)
CREAT UR-MCNC: 86.4 MG/DL
GFR SERPL CREATININE-BSD FRML MDRD: 38 ML/MIN/1.73
GLOBULIN UR ELPH-MCNC: 2.9 GM/DL
GLUCOSE BLD-MCNC: 168 MG/DL (ref 70–110)
HDLC SERPL-MCNC: 23 MG/DL (ref 60–100)
LDLC SERPL CALC-MCNC: 16 MG/DL (ref 0–100)
LDLC/HDLC SERPL: 0.69 {RATIO}
OSMOLALITY SERPL CALC.SUM OF ELEC: 278.2 MOSM/KG (ref 273–305)
POTASSIUM BLD-SCNC: 4.7 MMOL/L (ref 3.5–5.3)
PROT SERPL-MCNC: 7.3 G/DL (ref 6–8)
PROT UR-MCNC: 36 MG/DL
PROT/CREAT UR: 416.7 MG/G CREA (ref 0–200)
SODIUM BLD-SCNC: 137 MMOL/L (ref 135–153)
TRIGL SERPL-MCNC: 306 MG/DL (ref 0–150)
VLDLC SERPL-MCNC: 61.2 MG/DL

## 2018-11-06 PROCEDURE — 80053 COMPREHEN METABOLIC PANEL: CPT

## 2018-11-06 PROCEDURE — 82570 ASSAY OF URINE CREATININE: CPT

## 2018-11-06 PROCEDURE — 36415 COLL VENOUS BLD VENIPUNCTURE: CPT

## 2018-11-06 PROCEDURE — 80061 LIPID PANEL: CPT

## 2018-11-06 PROCEDURE — 84156 ASSAY OF PROTEIN URINE: CPT

## 2018-11-15 ENCOUNTER — OFFICE VISIT (OUTPATIENT)
Dept: GASTROENTEROLOGY | Facility: CLINIC | Age: 58
End: 2018-11-15

## 2018-11-15 VITALS
SYSTOLIC BLOOD PRESSURE: 135 MMHG | DIASTOLIC BLOOD PRESSURE: 65 MMHG | OXYGEN SATURATION: 90 % | HEART RATE: 90 BPM | BODY MASS INDEX: 30.17 KG/M2 | HEIGHT: 67 IN | WEIGHT: 192.2 LBS

## 2018-11-15 DIAGNOSIS — K21.9 GASTROESOPHAGEAL REFLUX DISEASE, ESOPHAGITIS PRESENCE NOT SPECIFIED: ICD-10-CM

## 2018-11-15 DIAGNOSIS — K59.04 CHRONIC IDIOPATHIC CONSTIPATION: Primary | ICD-10-CM

## 2018-11-15 DIAGNOSIS — K76.0 NAFLD (NONALCOHOLIC FATTY LIVER DISEASE): ICD-10-CM

## 2018-11-15 DIAGNOSIS — K50.10 CROHN'S DISEASE OF LARGE INTESTINE WITHOUT COMPLICATION (HCC): ICD-10-CM

## 2018-11-15 PROCEDURE — 99213 OFFICE O/P EST LOW 20 MIN: CPT | Performed by: PHYSICIAN ASSISTANT

## 2018-11-15 NOTE — PROGRESS NOTES
": 1960    Chief Complaint   Patient presents with   • Crohn's Disease       Nasrin Lizama is a 58 y.o. female who presents to the office today as a follow up appointment regarding Crohn's Disease and constipation.    History of Present Illness:  Patient reports that she has been taking Protonix 20 mg once daily as directed and has been doing well with this decrease from 40 mg once daily. Has CKD and follows with Dr. Ambrocio with recent good reports. She also took trulance samples daily for 3 weeks for treatment of constipation and felt that it was helping. With it, she was having soft, formed bowel movements on most days. Her constipation has never been adequately controlled per her report. She has episodes of abdominal cramping with \"sand like\" loose stools with urgency and feeling that she is not completely evacuating her stool, after this, she has small amount rectal bleeding. This has not occurred recently. She takes Apriso every day for treatment of Crohn's colitis with good control. No recent flares. No rectal bleeding over the past couple of months. She has a good appetite. Also has history of NAFLD with recent normal liver enzymes other than elevated ALT at 50. Last US liver was 2018 and showed fatty liver only, no lesions or signs of cirrhosis. Previous complete liver evaluation did not show any other liver diseases. Immunity was positive for both Hep A and B.      Review of Systems   Constitutional: Negative for appetite change, chills, fatigue, fever and unexpected weight change.   HENT: Negative for trouble swallowing.    Eyes: Negative.    Respiratory: Negative for cough, choking, chest tightness and shortness of breath.    Cardiovascular: Negative for chest pain.   Gastrointestinal: Positive for abdominal distention, blood in stool, constipation, diarrhea and nausea. Negative for abdominal pain, anal bleeding, rectal pain and vomiting.   Endocrine: Negative.    Genitourinary: Negative for " difficulty urinating.   Musculoskeletal: Positive for back pain. Negative for neck pain.   Skin: Positive for rash. Negative for color change and wound.   Allergic/Immunologic: Negative for environmental allergies.   Neurological: Negative for dizziness and headaches.   Hematological: Does not bruise/bleed easily.   Psychiatric/Behavioral: The patient is nervous/anxious.        Past Medical History:   Diagnosis Date   • Anxiety    • Arthritis    • Back pain    • Chronic kidney disease    • COPD (chronic obstructive pulmonary disease) (CMS/HCC)    • Crohn's disease (CMS/HCC)    • Depression    • Diabetes mellitus (CMS/HCC)    • H/O colonoscopy        Past Surgical History:   Procedure Laterality Date   • APPENDECTOMY     • BREAST BIOPSY Right 2014    benign   • BREAST BIOPSY Right 2001    benign   • COLONOSCOPY  2016   • HYSTERECTOMY     • NOSE SURGERY     • TUBAL ABDOMINAL LIGATION     • UPPER GASTROINTESTINAL ENDOSCOPY         Family History   Problem Relation Age of Onset   • Heart attack Mother         acute myocard infarction   • Cancer Other         cancer and breast cancer   • Diabetes Other    • Heart disease Other    • Multiple myeloma Other    • Breast cancer Maternal Aunt    • Breast cancer Maternal Aunt    • Cancer Father    • Multiple myeloma Father        Social History     Socioeconomic History   • Marital status:      Spouse name: Not on file   • Number of children: Not on file   • Years of education: Not on file   • Highest education level: Not on file   Tobacco Use   • Smoking status: Current Every Day Smoker     Packs/day: 1.00     Years: 15.00     Pack years: 15.00     Types: Cigarettes   • Smokeless tobacco: Never Used   Substance and Sexual Activity   • Alcohol use: No   • Drug use: No   • Sexual activity: Defer       Current Outpatient Medications:   •  clonazePAM (KlonoPIN) 1 MG tablet, Take 1 mg by mouth 2 (two) times a day as needed for seizures., Disp: , Rfl:   •  fenofibrate  "(TRICOR) 145 MG tablet, Take 145 mg by mouth daily., Disp: , Rfl:   •  glimepiride (AMARYL) 4 MG tablet, Take 4 mg by mouth every morning before breakfast., Disp: , Rfl:   •  hydrOXYzine (ATARAX) 25 MG tablet, , Disp: , Rfl:   •  insulin glargine (LANTUS) 100 UNIT/ML injection, Inject  under the skin daily., Disp: , Rfl:   •  linagliptin (TRADJENTA) 5 MG tablet tablet, Take 5 mg by mouth Daily., Disp: , Rfl:   •  mesalamine (APRISO) 0.375 g 24 hr capsule, Take 4 capsules once daily, Disp: 120 capsule, Rfl: 5  •  NOVOLOG FLEXPEN 100 UNIT/ML solution pen-injector sc pen, , Disp: , Rfl:   •  oxyCODONE-acetaminophen (PERCOCET) 7.5-325 MG per tablet, Take 1 tablet by mouth every 6 (six) hours as needed., Disp: , Rfl:   •  pantoprazole (PROTONIX) 20 MG EC tablet, Take 1 tablet by mouth Daily., Disp: 30 tablet, Rfl: 5  •  PARoxetine (PAXIL) 20 MG tablet, Take 1 tablet by mouth daily. As directed., Disp: , Rfl:   •  pravastatin (PRAVACHOL) 40 MG tablet, Take 1 tablet by mouth daily., Disp: , Rfl:   •  pregabalin (LYRICA) 150 MG capsule, Take 150 mg by mouth 2 (two) times a day., Disp: , Rfl:   •  promethazine (PHENERGAN) 25 MG tablet, Take 1 tablet by mouth Every 6 (Six) Hours As Needed for Nausea or Vomiting., Disp: 30 tablet, Rfl: 3  •  rosuvastatin (CRESTOR) 10 MG tablet, Take 10 mg by mouth daily., Disp: , Rfl:     Allergies:   Patient has no known allergies.    Vitals:  /65 (BP Location: Right arm, Patient Position: Sitting, Cuff Size: Adult)   Pulse 90   Ht 170.2 cm (67\")   Wt 87.2 kg (192 lb 3.2 oz)   SpO2 90%   BMI 30.10 kg/m²     Physical Exam   Constitutional: She is oriented to person, place, and time. She appears well-developed and well-nourished. No distress.   HENT:   Head: Normocephalic and atraumatic.   Nose: Nose normal.   Mouth/Throat: Oropharynx is clear and moist.   Eyes: Conjunctivae are normal. Right eye exhibits no discharge. Left eye exhibits no discharge. No scleral icterus.   Neck: " Normal range of motion. No JVD present.   Cardiovascular: Normal rate, regular rhythm and normal heart sounds. Exam reveals no gallop and no friction rub.   No murmur heard.  Pulmonary/Chest: Effort normal and breath sounds normal. No respiratory distress. She has no wheezes. She has no rales. She exhibits no tenderness.   Abdominal: Soft. Bowel sounds are normal. She exhibits no mass. There is tenderness (generalized but worst in epigastric).   Truncal obesity   Musculoskeletal: Normal range of motion. She exhibits no edema or deformity.   Neurological: She is alert and oriented to person, place, and time. Coordination normal.   Skin: Skin is warm and dry. No rash noted. She is not diaphoretic. No erythema.   Psychiatric: She has a normal mood and affect. Her behavior is normal. Judgment and thought content normal.   Vitals reviewed.      Assessment/Plan:  1. Chronic idiopathic constipation    2. Crohn's disease of large intestine without complication (CMS/HCC)    3. Gastroesophageal reflux disease, esophagitis presence not specified    4. NAFLD (nonalcoholic fatty liver disease)      She was given more samples of Trulance and RX sent to pharmacy today to continue using for management of constipation. I believe her episodes of loose stools and rectal bleeding may be related to constipation. We will continue to monitor.     Crohn's colitis has been managed well with Apriso as directed. She will continue this regimen.     For GERD, she will continue with Protonix 20 mg once daily. Offered that she try taking it every other day but she declined due to severe symptoms without it. We would like to avoid high dose PPI in setting of CKD.     Continue to monitor NAFLD with hepatic panel every 6 months and US liver at least annually.    New Medications Ordered This Visit   Medications   • Plecanatide 3 MG tablet     Sig: Take 3 mg by mouth Daily.     Dispense:  30 tablet     Refill:  5           Return in about 2 months  (around 1/15/2019) for recheck constipation.      Electronically signed 11/16/2018 10:46 AM  Kimber Le PA-C, [facility]

## 2019-01-22 ENCOUNTER — OFFICE VISIT (OUTPATIENT)
Dept: SURGERY | Facility: CLINIC | Age: 59
End: 2019-01-22

## 2019-01-22 VITALS
SYSTOLIC BLOOD PRESSURE: 155 MMHG | BODY MASS INDEX: 30.54 KG/M2 | DIASTOLIC BLOOD PRESSURE: 76 MMHG | HEIGHT: 67 IN | WEIGHT: 194.6 LBS | HEART RATE: 89 BPM

## 2019-01-22 DIAGNOSIS — L02.31 LEFT BUTTOCK ABSCESS: Primary | ICD-10-CM

## 2019-01-22 PROCEDURE — 99202 OFFICE O/P NEW SF 15 MIN: CPT | Performed by: SURGERY

## 2019-01-22 RX ORDER — CLINDAMYCIN HYDROCHLORIDE 300 MG/1
300 CAPSULE ORAL 3 TIMES DAILY
Qty: 20 CAPSULE | Refills: 0 | Status: SHIPPED | OUTPATIENT
Start: 2019-01-22 | End: 2019-08-29

## 2019-01-22 RX ORDER — CLINDAMYCIN HYDROCHLORIDE 300 MG/1
CAPSULE ORAL
COMMUNITY
Start: 2019-01-16 | End: 2019-01-22 | Stop reason: SDUPTHER

## 2019-01-22 NOTE — PROGRESS NOTES
Elliot Lizama is a 58 y.o. female.     History of Present Illness She has had a tender red area on her left buttock about 2 weeks. It was drained last week and she has been on Clindamycin. It has been packed daily.     The following portions of the patient's history were reviewed and updated as appropriate: current medications, past family history, past medical history, past social history, past surgical history and problem list.    Review of Systems    Objective   Physical Exam 5 cm diameter indurated slightly inflamed are with 1 cm opening and the tissue inside is granulating. No pus    Assessment/Plan   Nasrin was seen today for establish care and abscess.    Diagnoses and all orders for this visit:    Left buttock abscess    continue the clindamycin and recheck next week.

## 2019-01-28 ENCOUNTER — OFFICE VISIT (OUTPATIENT)
Dept: SURGERY | Facility: CLINIC | Age: 59
End: 2019-01-28

## 2019-01-28 VITALS — WEIGHT: 194 LBS | HEIGHT: 67 IN | BODY MASS INDEX: 30.45 KG/M2

## 2019-01-28 DIAGNOSIS — L02.31 LEFT BUTTOCK ABSCESS: Primary | ICD-10-CM

## 2019-01-28 PROCEDURE — 99212 OFFICE O/P EST SF 10 MIN: CPT | Performed by: SURGERY

## 2019-01-28 NOTE — PROGRESS NOTES
Subjective   Nasrin Lizama is a 58 y.o. female.     History of Present Illness Her wound is not draining much now and she has a little skin pealing.     The following portions of the patient's history were reviewed and updated as appropriate: current medications, past family history, past medical history, past social history, past surgical history and problem list.    Review of Systems    Objective   Physical Exam wound is shallow with no purulence. Induration is improving as well.     Assessment/Plan   Nasrin was seen today for follow-up.    Diagnoses and all orders for this visit:    Left buttock abscess    return prn

## 2019-02-07 ENCOUNTER — TRANSCRIBE ORDERS (OUTPATIENT)
Dept: LAB | Facility: HOSPITAL | Age: 59
End: 2019-02-07

## 2019-02-07 ENCOUNTER — LAB (OUTPATIENT)
Dept: LAB | Facility: HOSPITAL | Age: 59
End: 2019-02-07

## 2019-02-07 ENCOUNTER — TRANSCRIBE ORDERS (OUTPATIENT)
Dept: GENERAL RADIOLOGY | Facility: HOSPITAL | Age: 59
End: 2019-02-07

## 2019-02-07 DIAGNOSIS — I10 BENIGN ESSENTIAL HYPERTENSION: Primary | ICD-10-CM

## 2019-02-07 DIAGNOSIS — I10 BENIGN ESSENTIAL HYPERTENSION: ICD-10-CM

## 2019-02-07 DIAGNOSIS — N18.30 CHRONIC KIDNEY DISEASE, STAGE III (MODERATE) (HCC): ICD-10-CM

## 2019-02-07 DIAGNOSIS — N18.30 CHRONIC KIDNEY DISEASE, STAGE III (MODERATE) (HCC): Primary | ICD-10-CM

## 2019-02-07 LAB
ALBUMIN SERPL-MCNC: 4.7 G/DL (ref 3.4–4.8)
ALBUMIN/GLOB SERPL: 1.5 G/DL (ref 1.5–2.5)
ALP SERPL-CCNC: 78 U/L (ref 35–104)
ALT SERPL W P-5'-P-CCNC: 45 U/L (ref 10–36)
ANION GAP SERPL CALCULATED.3IONS-SCNC: 2.1 MMOL/L (ref 3.6–11.2)
AST SERPL-CCNC: 56 U/L (ref 10–30)
BASOPHILS # BLD AUTO: 0.04 10*3/MM3 (ref 0–0.3)
BASOPHILS NFR BLD AUTO: 1 % (ref 0–2)
BILIRUB SERPL-MCNC: 0.3 MG/DL (ref 0.2–1.8)
BILIRUB UR QL STRIP: NEGATIVE
BUN BLD-MCNC: 20 MG/DL (ref 7–21)
BUN/CREAT SERPL: 14.7 (ref 7–25)
CALCIUM SPEC-SCNC: 9.5 MG/DL (ref 7.7–10)
CHLORIDE SERPL-SCNC: 105 MMOL/L (ref 99–112)
CLARITY UR: CLEAR
CO2 SERPL-SCNC: 27.9 MMOL/L (ref 24.3–31.9)
COLOR UR: YELLOW
CREAT BLD-MCNC: 1.36 MG/DL (ref 0.43–1.29)
CREAT UR-MCNC: 76.7 MG/DL
DEPRECATED RDW RBC AUTO: 40.3 FL (ref 37–54)
EOSINOPHIL # BLD AUTO: 0.23 10*3/MM3 (ref 0–0.7)
EOSINOPHIL NFR BLD AUTO: 5.7 % (ref 0–5)
ERYTHROCYTE [DISTWIDTH] IN BLOOD BY AUTOMATED COUNT: 12.9 % (ref 11.5–14.5)
GFR SERPL CREATININE-BSD FRML MDRD: 40 ML/MIN/1.73
GLOBULIN UR ELPH-MCNC: 3.2 GM/DL
GLUCOSE BLD-MCNC: 168 MG/DL (ref 70–110)
GLUCOSE UR STRIP-MCNC: NEGATIVE MG/DL
HCT VFR BLD AUTO: 37.9 % (ref 37–47)
HGB BLD-MCNC: 12.5 G/DL (ref 12–16)
HGB UR QL STRIP.AUTO: ABNORMAL
IMM GRANULOCYTES # BLD AUTO: 0.01 10*3/MM3 (ref 0–0.03)
IMM GRANULOCYTES NFR BLD AUTO: 0.2 % (ref 0–0.5)
KETONES UR QL STRIP: NEGATIVE
LEUKOCYTE ESTERASE UR QL STRIP.AUTO: ABNORMAL
LYMPHOCYTES # BLD AUTO: 1.2 10*3/MM3 (ref 1–3)
LYMPHOCYTES NFR BLD AUTO: 29.9 % (ref 21–51)
MCH RBC QN AUTO: 28.7 PG (ref 27–33)
MCHC RBC AUTO-ENTMCNC: 33 G/DL (ref 33–37)
MCV RBC AUTO: 86.9 FL (ref 80–94)
MONOCYTES # BLD AUTO: 0.54 10*3/MM3 (ref 0.1–0.9)
MONOCYTES NFR BLD AUTO: 13.4 % (ref 0–10)
NEUTROPHILS # BLD AUTO: 2 10*3/MM3 (ref 1.4–6.5)
NEUTROPHILS NFR BLD AUTO: 49.8 % (ref 30–70)
NITRITE UR QL STRIP: NEGATIVE
OSMOLALITY SERPL CALC.SUM OF ELEC: 276.6 MOSM/KG (ref 273–305)
PH UR STRIP.AUTO: <=5 [PH] (ref 5–8)
PLATELET # BLD AUTO: 263 10*3/MM3 (ref 130–400)
PMV BLD AUTO: 12 FL (ref 6–10)
POTASSIUM BLD-SCNC: 4.8 MMOL/L (ref 3.5–5.3)
PROT SERPL-MCNC: 7.9 G/DL (ref 6–8)
PROT UR QL STRIP: ABNORMAL
PROT UR-MCNC: 46.4 MG/DL
PROT/CREAT UR: 605 MG/G CREA (ref 0–200)
RBC # BLD AUTO: 4.36 10*6/MM3 (ref 4.2–5.4)
SODIUM BLD-SCNC: 135 MMOL/L (ref 135–153)
SP GR UR STRIP: 1.01 (ref 1–1.03)
UROBILINOGEN UR QL STRIP: ABNORMAL
WBC NRBC COR # BLD: 4.02 10*3/MM3 (ref 4.5–12.5)

## 2019-02-07 PROCEDURE — 84156 ASSAY OF PROTEIN URINE: CPT

## 2019-02-07 PROCEDURE — 82570 ASSAY OF URINE CREATININE: CPT

## 2019-02-07 PROCEDURE — 36415 COLL VENOUS BLD VENIPUNCTURE: CPT

## 2019-02-07 PROCEDURE — 85025 COMPLETE CBC W/AUTO DIFF WBC: CPT

## 2019-02-07 PROCEDURE — 81003 URINALYSIS AUTO W/O SCOPE: CPT

## 2019-02-07 PROCEDURE — 80053 COMPREHEN METABOLIC PANEL: CPT

## 2019-02-20 ENCOUNTER — TRANSCRIBE ORDERS (OUTPATIENT)
Dept: ADMINISTRATIVE | Facility: HOSPITAL | Age: 59
End: 2019-02-20

## 2019-02-20 ENCOUNTER — LAB (OUTPATIENT)
Dept: LAB | Facility: HOSPITAL | Age: 59
End: 2019-02-20

## 2019-02-20 DIAGNOSIS — N18.30 CHRONIC KIDNEY DISEASE, STAGE III (MODERATE) (HCC): ICD-10-CM

## 2019-02-20 DIAGNOSIS — N18.30 CHRONIC KIDNEY DISEASE, STAGE III (MODERATE) (HCC): Primary | ICD-10-CM

## 2019-02-20 LAB
COLLECT DURATION TIME UR: 24 HRS
MICRO TOTAL PROTEIN: 14.8 MG/DL
MICROALBUMIN 24H MFR UR: 333 MG/24 HR (ref 0–29)
SPECIMEN VOL 24H UR: 2250 ML

## 2019-02-20 PROCEDURE — 81050 URINALYSIS VOLUME MEASURE: CPT

## 2019-02-20 PROCEDURE — 84156 ASSAY OF PROTEIN URINE: CPT

## 2019-02-25 ENCOUNTER — OFFICE VISIT (OUTPATIENT)
Dept: GASTROENTEROLOGY | Facility: CLINIC | Age: 59
End: 2019-02-25

## 2019-02-25 VITALS
BODY MASS INDEX: 29.82 KG/M2 | WEIGHT: 190 LBS | SYSTOLIC BLOOD PRESSURE: 135 MMHG | OXYGEN SATURATION: 92 % | HEIGHT: 67 IN | HEART RATE: 77 BPM | DIASTOLIC BLOOD PRESSURE: 77 MMHG

## 2019-02-25 DIAGNOSIS — K21.9 GASTROESOPHAGEAL REFLUX DISEASE, ESOPHAGITIS PRESENCE NOT SPECIFIED: ICD-10-CM

## 2019-02-25 DIAGNOSIS — K50.10 CROHN'S DISEASE OF COLON WITHOUT COMPLICATION (HCC): Primary | ICD-10-CM

## 2019-02-25 DIAGNOSIS — K76.0 NAFLD (NONALCOHOLIC FATTY LIVER DISEASE): ICD-10-CM

## 2019-02-25 PROCEDURE — 99214 OFFICE O/P EST MOD 30 MIN: CPT | Performed by: PHYSICIAN ASSISTANT

## 2019-02-25 NOTE — PROGRESS NOTES
": 1960    Chief Complaint   Patient presents with   • Crohn's Disease   • Constipation   • Heartburn       Nasrin Lizama is a 58 y.o. female with PMH of fatty liver infiltration who presents to the office today as a follow up appointment regarding Crohn's, Constipation and Heartburn.    History of Present Illness:  Took Trulance 3 mg once daily for 3 weeks and she reports that she stopped taking it due to too much diarrhea. She was having to stay home due to diarrhea with fecal urgency while taking this medication. She still does not feel that she has normal bowel habits. She feels that they are more normal than they were previous. She is no longer having \"sand like\" stools and most of the time she has normal soft stools. Has BMs every day with only rare skip days between stools. If she skips, she will have more stools that next day. Still taking Apriso for Crohn's colitis which reportedly helped the most the first month but then maintained. She has trouble swallowing these 4 capsules. She will have intermittent rectal bleeding in small amounts but not regularly. Has bloating which is present often. Denies any abdominal pain presently, has mild, intermittent abdominal pain that does not interfere with her ADLs. Still taking Protonix 20 mg once daily (as recommended due to CKD) and only takes TUMs as needed for heartburn.     Last colonoscopy was 2018 by Dr. Carver which showed, \"evidence of active colitis involving the rectum and sigmoid colon--patchy mucosal erythema and scattered small superficial ulcerations were noted in the affected portions of the colon.\"    CT Aug 2018 on my review hepatomegaly with fatty infiltration    Labs 2019:  ALT (SGPT) 10 - 36 U/L 45 Abnormally high     AST (SGOT) 10 - 30 U/L 56 Abnormally high     Alkaline Phosphatase 35 - 104 U/L 78    Comment: Note New Reference Ranges   Total Bilirubin 0.2 - 1.8 mg/dL 0.3    eGFR Non African Amer >60 mL/min/1.73 40 Abnormally low   "     Labs 11/2018:  Total Cholesterol 0 - 200 mg/dL 100    Triglycerides 0 - 150 mg/dL 306 Abnormally high     HDL Cholesterol 60 - 100 mg/dL 23 Abnormally low     LDL Cholesterol  0 - 100 mg/dL 16      Review of Systems   Constitutional: Negative for appetite change, chills, fatigue, fever and unexpected weight change.   HENT: Negative for trouble swallowing.    Eyes: Negative.    Respiratory: Negative for cough, choking, chest tightness and shortness of breath.    Cardiovascular: Negative for chest pain.   Gastrointestinal: Positive for abdominal distention, anal bleeding, blood in stool, constipation, diarrhea and nausea. Negative for abdominal pain, rectal pain and vomiting.   Endocrine: Negative.    Genitourinary: Negative for difficulty urinating.   Musculoskeletal: Positive for back pain. Negative for neck pain.   Skin: Positive for rash. Negative for color change and wound.   Allergic/Immunologic: Negative for environmental allergies.   Neurological: Negative for dizziness and headaches.   Hematological: Does not bruise/bleed easily.   Psychiatric/Behavioral: The patient is nervous/anxious.        Past Medical History:   Diagnosis Date   • Anxiety    • Arthritis    • Back pain    • Chronic kidney disease    • COPD (chronic obstructive pulmonary disease) (CMS/HCC)    • Crohn's disease (CMS/HCC)    • Depression    • Diabetes mellitus (CMS/HCC)    • H/O colonoscopy        Past Surgical History:   Procedure Laterality Date   • APPENDECTOMY     • BREAST BIOPSY Right 2014    benign   • BREAST BIOPSY Right 2001    benign   • COLONOSCOPY  2016   • COLONOSCOPY N/A 6/22/2016    Procedure: COLONOSCOPY;  Surgeon: Junior Carver III, MD;  Location: [facility] OR;  Service:    • COLONOSCOPY N/A 2/15/2018    Procedure: COLONOSCOPY  CPTCODE:33791;  Surgeon: Junior Carver III, MD;  Location: [facility] OR;  Service:    • ENDOSCOPY N/A 6/22/2016    Procedure: ESOPHAGOGASTRODUODENOSCOPY W/ BIOPSY;  Surgeon: Junior Ellis  Mehul SOLER MD;  Location: [facility];  Service:    • ENDOSCOPY N/A 2/15/2018    Procedure: ESOPHAGOGASTRODUODENOSCOPY WITH BIOPSY  CPTCODE:17626;  Surgeon: Junior Carver III, MD;  Location: [facility];  Service:    • HYSTERECTOMY     • NOSE SURGERY     • TUBAL ABDOMINAL LIGATION     • UPPER GASTROINTESTINAL ENDOSCOPY         Family History   Problem Relation Age of Onset   • Heart attack Mother         acute myocard infarction   • Cancer Other         cancer and breast cancer   • Diabetes Other    • Heart disease Other    • Multiple myeloma Other    • Breast cancer Maternal Aunt    • Breast cancer Maternal Aunt    • Cancer Father    • Multiple myeloma Father        Social History     Socioeconomic History   • Marital status:      Spouse name: Not on file   • Number of children: Not on file   • Years of education: Not on file   • Highest education level: Not on file   Tobacco Use   • Smoking status: Current Every Day Smoker     Packs/day: 1.00     Years: 15.00     Pack years: 15.00     Types: Cigarettes   • Smokeless tobacco: Never Used   Substance and Sexual Activity   • Alcohol use: No   • Drug use: No   • Sexual activity: Defer       Current Outpatient Medications:   •  clindamycin (CLEOCIN) 300 MG capsule, Take 1 capsule by mouth 3 (Three) Times a Day., Disp: 20 capsule, Rfl: 0  •  clonazePAM (KlonoPIN) 1 MG tablet, Take 1 mg by mouth 2 (two) times a day as needed for seizures., Disp: , Rfl:   •  fenofibrate (TRICOR) 145 MG tablet, Take 145 mg by mouth daily., Disp: , Rfl:   •  glimepiride (AMARYL) 4 MG tablet, Take 4 mg by mouth every morning before breakfast., Disp: , Rfl:   •  hydrOXYzine (ATARAX) 25 MG tablet, , Disp: , Rfl:   •  insulin glargine (LANTUS) 100 UNIT/ML injection, Inject  under the skin daily., Disp: , Rfl:   •  linagliptin (TRADJENTA) 5 MG tablet tablet, Take 5 mg by mouth Daily., Disp: , Rfl:   •  mesalamine (APRISO) 0.375 g 24 hr capsule, Take 4 capsules once daily, Disp: 120  "capsule, Rfl: 5  •  NOVOLOG FLEXPEN 100 UNIT/ML solution pen-injector sc pen, , Disp: , Rfl:   •  oxyCODONE-acetaminophen (PERCOCET) 7.5-325 MG per tablet, Take 1 tablet by mouth every 6 (six) hours as needed., Disp: , Rfl:   •  pantoprazole (PROTONIX) 20 MG EC tablet, Take 1 tablet by mouth Daily., Disp: 30 tablet, Rfl: 5  •  PARoxetine (PAXIL) 20 MG tablet, Take 1 tablet by mouth daily. As directed., Disp: , Rfl:   •  Plecanatide 3 MG tablet, Take 3 mg by mouth Daily., Disp: 30 tablet, Rfl: 5  •  pravastatin (PRAVACHOL) 40 MG tablet, Take 1 tablet by mouth daily., Disp: , Rfl:   •  pregabalin (LYRICA) 150 MG capsule, Take 150 mg by mouth 2 (two) times a day., Disp: , Rfl:   •  promethazine (PHENERGAN) 25 MG tablet, Take 1 tablet by mouth Every 6 (Six) Hours As Needed for Nausea or Vomiting., Disp: 30 tablet, Rfl: 3  •  rosuvastatin (CRESTOR) 10 MG tablet, Take 10 mg by mouth daily., Disp: , Rfl:     Allergies:   Patient has no known allergies.    Vitals:  /77   Pulse 77   Ht 170.2 cm (67\")   Wt 86.2 kg (190 lb)   SpO2 92%   BMI 29.76 kg/m²     Physical Exam   Constitutional: She is oriented to person, place, and time. She appears well-developed and well-nourished. No distress.   HENT:   Head: Normocephalic and atraumatic.   Nose: Nose normal.   Mouth/Throat: Oropharynx is clear and moist.   Eyes: Conjunctivae are normal. Right eye exhibits no discharge. Left eye exhibits no discharge. No scleral icterus.   Neck: Normal range of motion. No JVD present.   Cardiovascular: Normal rate, regular rhythm and normal heart sounds. Exam reveals no gallop and no friction rub.   No murmur heard.  Pulmonary/Chest: Effort normal and breath sounds normal. No respiratory distress. She has no wheezes. She has no rales. She exhibits no tenderness.   Abdominal: Soft. Bowel sounds are normal. She exhibits no mass. There is hepatomegaly. There is tenderness (generalized but worst in epigastric (likely xiphoid process)). "   Truncal obesity   Musculoskeletal: Normal range of motion. She exhibits no edema or deformity.   Neurological: She is alert and oriented to person, place, and time. Coordination normal.   Skin: Skin is warm and dry. No rash noted. She is not diaphoretic. No erythema.   Psychiatric: She has a normal mood and affect. Her behavior is normal. Judgment and thought content normal.   Vitals reviewed.      Assessment/Plan:  1. Crohn's disease of colon without complication (CMS/HCC)    2. NAFLD (nonalcoholic fatty liver disease)    3. Gastroesophageal reflux disease, esophagitis presence not specified      Crohn's colitis has been managed well with Apriso as directed. She will continue this regimen. Next CRCS due 2/2020 (last colonoscopy in 2/2018).     Continue to monitor NAFLD with hepatic panel every 6 months and US liver at least annually. (Due for next labs and imaging 8/2019).    For GERD, she will continue with Protonix 20 mg once daily. We would like to avoid high dose PPI in setting of CKD. eGFR mildly improved since Nov 2018.           Return in about 6 months (around 8/25/2019) for recheck NAFLD and Crohn's.      Electronically signed 2/25/2019 3:50 PM  Kimber Le PA-C, [facility]

## 2019-03-19 ENCOUNTER — TRANSCRIBE ORDERS (OUTPATIENT)
Dept: ADMINISTRATIVE | Facility: HOSPITAL | Age: 59
End: 2019-03-19

## 2019-03-19 ENCOUNTER — LAB (OUTPATIENT)
Dept: LAB | Facility: HOSPITAL | Age: 59
End: 2019-03-19

## 2019-03-19 DIAGNOSIS — N18.30 CHRONIC RENAL DISEASE, STAGE III (HCC): ICD-10-CM

## 2019-03-19 DIAGNOSIS — N18.30 CHRONIC KIDNEY DISEASE, STAGE III (MODERATE) (HCC): Primary | ICD-10-CM

## 2019-03-19 PROCEDURE — 86803 HEPATITIS C AB TEST: CPT

## 2019-03-19 PROCEDURE — 80053 COMPREHEN METABOLIC PANEL: CPT

## 2019-03-19 PROCEDURE — 81001 URINALYSIS AUTO W/SCOPE: CPT

## 2019-03-19 PROCEDURE — 82570 ASSAY OF URINE CREATININE: CPT

## 2019-03-19 PROCEDURE — 83516 IMMUNOASSAY NONANTIBODY: CPT

## 2019-03-19 PROCEDURE — 84156 ASSAY OF PROTEIN URINE: CPT

## 2019-03-19 PROCEDURE — 36415 COLL VENOUS BLD VENIPUNCTURE: CPT

## 2019-03-19 PROCEDURE — 86256 FLUORESCENT ANTIBODY TITER: CPT

## 2019-03-19 PROCEDURE — 86160 COMPLEMENT ANTIGEN: CPT

## 2019-03-19 PROCEDURE — 86334 IMMUNOFIX E-PHORESIS SERUM: CPT

## 2019-03-19 PROCEDURE — 84165 PROTEIN E-PHORESIS SERUM: CPT

## 2019-03-19 PROCEDURE — 82150 ASSAY OF AMYLASE: CPT

## 2019-03-19 PROCEDURE — 87340 HEPATITIS B SURFACE AG IA: CPT

## 2019-03-19 PROCEDURE — 82043 UR ALBUMIN QUANTITATIVE: CPT

## 2019-03-19 PROCEDURE — 83520 IMMUNOASSAY QUANT NOS NONAB: CPT

## 2019-03-19 PROCEDURE — 86038 ANTINUCLEAR ANTIBODIES: CPT

## 2019-03-19 PROCEDURE — 86225 DNA ANTIBODY NATIVE: CPT

## 2019-03-19 PROCEDURE — 82784 ASSAY IGA/IGD/IGG/IGM EACH: CPT

## 2019-03-19 PROCEDURE — 87086 URINE CULTURE/COLONY COUNT: CPT

## 2019-03-20 LAB
ALBUMIN SERPL-MCNC: 4.2 G/DL (ref 3.5–5.2)
ALBUMIN UR-MCNC: 23.8 MG/L
ALBUMIN/GLOB SERPL: 1.4 G/DL
ALP SERPL-CCNC: 66 U/L (ref 39–117)
ALT SERPL W P-5'-P-CCNC: 41 U/L (ref 1–33)
AMYLASE SERPL-CCNC: 39 U/L (ref 28–100)
ANION GAP SERPL CALCULATED.3IONS-SCNC: 12 MMOL/L
AST SERPL-CCNC: 50 U/L (ref 1–32)
BACTERIA UR QL AUTO: ABNORMAL /HPF
BILIRUB SERPL-MCNC: 0.3 MG/DL (ref 0.2–1.2)
BILIRUB UR QL STRIP: NEGATIVE
BUN BLD-MCNC: 12 MG/DL (ref 8–23)
BUN/CREAT SERPL: 8.5 (ref 7–25)
CALCIUM SPEC-SCNC: 9.6 MG/DL (ref 8.6–10.5)
CHLORIDE SERPL-SCNC: 99 MMOL/L (ref 98–107)
CLARITY UR: CLEAR
CO2 SERPL-SCNC: 26 MMOL/L (ref 22–29)
COLOR UR: YELLOW
CREAT BLD-MCNC: 1.41 MG/DL (ref 0.57–1)
CREAT UR-MCNC: 84.3 MG/DL
CREAT UR-MCNC: 84.3 MG/DL
GFR SERPL CREATININE-BSD FRML MDRD: 38 ML/MIN/1.73
GLOBULIN UR ELPH-MCNC: 3.1 GM/DL
GLUCOSE BLD-MCNC: 211 MG/DL (ref 65–99)
GLUCOSE UR STRIP-MCNC: NEGATIVE MG/DL
HBV SURFACE AG SERPL QL IA: NORMAL
HCV AB SER DONR QL: NORMAL
HGB UR QL STRIP.AUTO: ABNORMAL
HYALINE CASTS UR QL AUTO: ABNORMAL /LPF
KETONES UR QL STRIP: NEGATIVE
LEUKOCYTE ESTERASE UR QL STRIP.AUTO: ABNORMAL
MICROALBUMIN/CREAT UR: 282.3 MG/G
NITRITE UR QL STRIP: NEGATIVE
PH UR STRIP.AUTO: 5.5 [PH] (ref 5–8)
POTASSIUM BLD-SCNC: 4.9 MMOL/L (ref 3.5–5.2)
PROT SERPL-MCNC: 7.3 G/DL (ref 6–8.5)
PROT UR QL STRIP: ABNORMAL
PROT UR-MCNC: 65 MG/DL
PROT/CREAT UR: 771.1 MG/G CREA (ref 0–200)
RBC # UR: ABNORMAL /HPF
REF LAB TEST METHOD: ABNORMAL
SODIUM BLD-SCNC: 137 MMOL/L (ref 136–145)
SP GR UR STRIP: 1.02 (ref 1–1.03)
SQUAMOUS #/AREA URNS HPF: ABNORMAL /HPF
UROBILINOGEN UR QL STRIP: ABNORMAL
WBC UR QL AUTO: ABNORMAL /HPF

## 2019-03-21 LAB
ALBUMIN SERPL-MCNC: 3.6 G/DL (ref 2.9–4.4)
ALBUMIN/GLOB SERPL: 1 {RATIO} (ref 0.7–1.7)
ALPHA1 GLOB FLD ELPH-MCNC: 0.3 G/DL (ref 0–0.4)
ALPHA2 GLOB SERPL ELPH-MCNC: 0.9 G/DL (ref 0.4–1)
ANA SER QL: NEGATIVE
B-GLOBULIN SERPL ELPH-MCNC: 1.2 G/DL (ref 0.7–1.3)
BACTERIA SPEC AEROBE CULT: NO GROWTH
C-ANCA TITR SER IF: NORMAL TITER
C3 SERPL-MCNC: 207 MG/DL (ref 82–167)
C4 SERPL-MCNC: 35 MG/DL (ref 14–44)
DSDNA AB SER-ACNC: 2 IU/ML (ref 0–9)
GAMMA GLOB SERPL ELPH-MCNC: 1.2 G/DL (ref 0.4–1.8)
GLOBULIN SER CALC-MCNC: 3.7 G/DL (ref 2.2–3.9)
IGA SERPL-MCNC: 220 MG/DL (ref 87–352)
IGG SERPL-MCNC: 1116 MG/DL (ref 700–1600)
IGM SERPL-MCNC: 64 MG/DL (ref 26–217)
INTERPRETATION SERPL IEP-IMP: NORMAL
Lab: NORMAL
M-SPIKE: NORMAL G/DL
P-ANCA ATYPICAL TITR SER IF: NORMAL TITER
P-ANCA TITR SER IF: NORMAL TITER
PROT SERPL-MCNC: 7.3 G/DL (ref 6–8.5)

## 2019-03-22 LAB
C-ANCA TITR SER IF: NORMAL TITER
GBM IGG SER-ACNC: 3 UNITS (ref 0–20)
MYELOPEROXIDASE AB SER-ACNC: <9 U/ML (ref 0–9)
P-ANCA ATYPICAL TITR SER IF: NORMAL TITER
P-ANCA TITR SER IF: NORMAL TITER
PROTEINASE3 AB SER IA-ACNC: <3.5 U/ML (ref 0–3.5)

## 2019-03-26 ENCOUNTER — TRANSCRIBE ORDERS (OUTPATIENT)
Dept: LAB | Facility: HOSPITAL | Age: 59
End: 2019-03-26

## 2019-03-26 ENCOUNTER — LAB (OUTPATIENT)
Dept: LAB | Facility: HOSPITAL | Age: 59
End: 2019-03-26

## 2019-03-26 DIAGNOSIS — N18.30 CHRONIC KIDNEY DISEASE, STAGE III (MODERATE) (HCC): ICD-10-CM

## 2019-03-26 DIAGNOSIS — N18.30 CHRONIC KIDNEY DISEASE, STAGE III (MODERATE) (HCC): Primary | ICD-10-CM

## 2019-03-26 PROCEDURE — 84156 ASSAY OF PROTEIN URINE: CPT

## 2019-03-26 PROCEDURE — 81050 URINALYSIS VOLUME MEASURE: CPT

## 2019-03-27 LAB
COLLECT DURATION TIME UR: 24 HRS
PROT 24H UR-MRATE: 331.5 MG/24HOURS (ref 0–150)
PROT UR-MCNC: 13 MG/DL
SPECIMEN VOL 24H UR: 2550 ML

## 2019-08-29 ENCOUNTER — OFFICE VISIT (OUTPATIENT)
Dept: GASTROENTEROLOGY | Facility: CLINIC | Age: 59
End: 2019-08-29

## 2019-08-29 ENCOUNTER — TELEPHONE (OUTPATIENT)
Dept: GASTROENTEROLOGY | Facility: CLINIC | Age: 59
End: 2019-08-29

## 2019-08-29 VITALS
OXYGEN SATURATION: 88 % | BODY MASS INDEX: 30.35 KG/M2 | WEIGHT: 193.4 LBS | HEART RATE: 108 BPM | DIASTOLIC BLOOD PRESSURE: 77 MMHG | SYSTOLIC BLOOD PRESSURE: 131 MMHG | HEIGHT: 67 IN

## 2019-08-29 DIAGNOSIS — K50.10 CROHN'S DISEASE OF COLON WITHOUT COMPLICATION (HCC): Primary | ICD-10-CM

## 2019-08-29 DIAGNOSIS — K59.04 CHRONIC IDIOPATHIC CONSTIPATION: ICD-10-CM

## 2019-08-29 DIAGNOSIS — K76.0 NAFLD (NONALCOHOLIC FATTY LIVER DISEASE): ICD-10-CM

## 2019-08-29 PROCEDURE — 99214 OFFICE O/P EST MOD 30 MIN: CPT | Performed by: PHYSICIAN ASSISTANT

## 2019-08-29 RX ORDER — MESALAMINE 0.38 G/1
CAPSULE, EXTENDED RELEASE ORAL
Qty: 120 CAPSULE | Refills: 5 | Status: SHIPPED | OUTPATIENT
Start: 2019-08-29

## 2019-08-29 RX ORDER — LUBIPROSTONE 8 UG/1
8 CAPSULE ORAL 2 TIMES DAILY WITH MEALS
Qty: 60 CAPSULE | Refills: 11 | Status: SHIPPED | OUTPATIENT
Start: 2019-08-29

## 2019-08-29 NOTE — PROGRESS NOTES
: 1960    Chief Complaint   Patient presents with   • Crohn's Disease   • NAFLD       Nasrin Lizama is a 59 y.o. female who presents to the office today as a follow up appointment regarding Crohn's Disease and NAFLD.    History of Present Illness:  She reports that Crohn's was previously controlled with Apriso 4 tablets once daily for a few months after her last colonoscopy. Has not taken Apriso in months. She is now having fecal urgency with intermittent incontinence. Has approx 4-6 very small liquid stools daily. Sometimes, her stools are erika in consistency. Denies any rectal bleeding. Has not used mesalamine enemas in the past. She does have a history of constipation but is not currently taking any medications for it. Does not have skip days between her stools but does feel that she is retaining stool because she has such small movements. Appetite is same. She is now on oxygen at night due to chronic lung condition. She has CKD, stage 3 and follows with Dr. Ambrocio. Taking Protonix 20 mg once daily for GERD, takes TUMs only intermittently, has good control of symptoms with this regimen. Had labs with PCP approx 1 month ago. Does not drink alcohol. She has not lost any weight since last visit.    Review of Systems   Constitutional: Negative for appetite change, chills, fatigue, fever and unexpected weight change.   HENT: Negative for trouble swallowing.    Eyes: Negative.    Respiratory: Negative for cough, choking, chest tightness and shortness of breath.    Cardiovascular: Negative for chest pain.   Gastrointestinal: Positive for abdominal distention, anal bleeding, constipation and diarrhea. Negative for abdominal pain, blood in stool, nausea, rectal pain and vomiting.   Endocrine: Negative.    Genitourinary: Negative for difficulty urinating.   Musculoskeletal: Positive for back pain. Negative for neck pain.   Skin: Positive for rash. Negative for color change and wound.   Allergic/Immunologic:  Negative for environmental allergies.   Neurological: Negative for dizziness and headaches.   Hematological: Does not bruise/bleed easily.   Psychiatric/Behavioral: The patient is nervous/anxious.        Past Medical History:   Diagnosis Date   • Anxiety    • Arthritis    • Back pain    • Chronic kidney disease    • COPD (chronic obstructive pulmonary disease) (CMS/HCC)    • Crohn's disease (CMS/HCC)    • Depression    • Diabetes mellitus (CMS/HCC)    • H/O colonoscopy        Past Surgical History:   Procedure Laterality Date   • APPENDECTOMY     • BREAST BIOPSY Right 2014    benign   • BREAST BIOPSY Right 2001    benign   • COLONOSCOPY  2016   • COLONOSCOPY N/A 6/22/2016    Procedure: COLONOSCOPY;  Surgeon: Junior Carver III, MD;  Location:  [facility] OR;  Service:    • COLONOSCOPY N/A 2/15/2018    Procedure: COLONOSCOPY  CPTCODE:49362;  Surgeon: Junior Carver III, MD;  Location:  [facility] OR;  Service:    • ENDOSCOPY N/A 6/22/2016    Procedure: ESOPHAGOGASTRODUODENOSCOPY W/ BIOPSY;  Surgeon: Junior Carver III, MD;  Location: [facility] OR;  Service:    • ENDOSCOPY N/A 2/15/2018    Procedure: ESOPHAGOGASTRODUODENOSCOPY WITH BIOPSY  CPTCODE:89008;  Surgeon: Junior Carver III, MD;  Location: [facility] OR;  Service:    • HYSTERECTOMY     • NOSE SURGERY     • TUBAL ABDOMINAL LIGATION     • UPPER GASTROINTESTINAL ENDOSCOPY         Family History   Problem Relation Age of Onset   • Heart attack Mother         acute myocard infarction   • Cancer Other         cancer and breast cancer   • Diabetes Other    • Heart disease Other    • Multiple myeloma Other    • Breast cancer Maternal Aunt    • Breast cancer Maternal Aunt    • Cancer Father    • Multiple myeloma Father        Social History     Socioeconomic History   • Marital status:      Spouse name: Not on file   • Number of children: Not on file   • Years of education: Not on file   • Highest education level: Not on file   Tobacco Use   • Smoking  "status: Current Every Day Smoker     Packs/day: 1.00     Years: 15.00     Pack years: 15.00     Types: Cigarettes   • Smokeless tobacco: Never Used   Substance and Sexual Activity   • Alcohol use: No   • Drug use: No   • Sexual activity: Defer       Current Outpatient Medications:   •  clonazePAM (KlonoPIN) 1 MG tablet, Take 1 mg by mouth 2 (two) times a day as needed for seizures., Disp: , Rfl:   •  fenofibrate (TRICOR) 145 MG tablet, Take 145 mg by mouth daily., Disp: , Rfl:   •  glimepiride (AMARYL) 4 MG tablet, Take 4 mg by mouth every morning before breakfast., Disp: , Rfl:   •  hydrOXYzine (ATARAX) 25 MG tablet, , Disp: , Rfl:   •  insulin glargine (LANTUS) 100 UNIT/ML injection, Inject  under the skin daily., Disp: , Rfl:   •  NOVOLOG FLEXPEN 100 UNIT/ML solution pen-injector sc pen, , Disp: , Rfl:   •  oxyCODONE-acetaminophen (PERCOCET) 7.5-325 MG per tablet, Take 1 tablet by mouth every 6 (six) hours as needed., Disp: , Rfl:   •  pantoprazole (PROTONIX) 20 MG EC tablet, Take 1 tablet by mouth Daily., Disp: 30 tablet, Rfl: 5  •  PARoxetine (PAXIL) 20 MG tablet, Take 1 tablet by mouth daily. As directed., Disp: , Rfl:   •  pravastatin (PRAVACHOL) 40 MG tablet, Take 1 tablet by mouth daily., Disp: , Rfl:   •  pregabalin (LYRICA) 150 MG capsule, Take 150 mg by mouth 2 (two) times a day., Disp: , Rfl:   •  rosuvastatin (CRESTOR) 10 MG tablet, Take 10 mg by mouth daily., Disp: , Rfl:     Allergies:   Patient has no known allergies.    Vitals:  /77 (BP Location: Left arm, Patient Position: Sitting, Cuff Size: Adult)   Pulse 108   Ht 170.2 cm (67\")   Wt 87.7 kg (193 lb 6.4 oz)   SpO2 (!) 88%   BMI 30.29 kg/m²     Physical Exam   Constitutional: She is oriented to person, place, and time. She appears well-developed and well-nourished. No distress.   HENT:   Head: Normocephalic and atraumatic.   Nose: Nose normal.   Mouth/Throat: Oropharynx is clear and moist.   Eyes: Conjunctivae are normal. Right eye " "exhibits no discharge. Left eye exhibits no discharge. No scleral icterus.   Neck: Normal range of motion. No JVD present.   Cardiovascular: Normal rate, regular rhythm and normal heart sounds. Exam reveals no gallop and no friction rub.   No murmur heard.  Pulmonary/Chest: Effort normal. No respiratory distress. She has no wheezes. She has no rales. She exhibits no tenderness.   Decreased breath sounds throughout   Abdominal: Soft. Bowel sounds are normal. She exhibits no mass. There is tenderness (geneeralized, mild).   Musculoskeletal: Normal range of motion. She exhibits no edema or deformity.   Neurological: She is alert and oriented to person, place, and time. Coordination normal.   Skin: Skin is warm and dry. No rash noted. She is not diaphoretic. No erythema.   Psychiatric: She has a normal mood and affect. Her behavior is normal. Judgment and thought content normal.   Vitals reviewed.    Results Review:  Colonoscopy was 2/2018 by Dr. Carver showed, \"evidence of active colitis involving the rectum and sigmoid colon--patchy mucosal erythema and scattered small superficial ulcerations were noted in the affected portions of the colon.\" Pathology was consistent with Crohn's disease.     CT Aug 2018 on my review hepatomegaly with fatty infiltration    Assessment:  1. Crohn's disease of colon without complication (CMS/HCC)    2. Chronic idiopathic constipation    3. NAFLD (nonalcoholic fatty liver disease)      Plan:  Orders Placed This Encounter   Procedures   • US Liver     New Medications Ordered This Visit   Medications   • mesalamine (APRISO) 0.375 g 24 hr capsule     Sig: Take 4 capsules once daily     Dispense:  120 capsule     Refill:  5   • lubiprostone (AMITIZA) 8 MCG capsule     Sig: Take 1 capsule by mouth 2 (Two) Times a Day With Meals.     Dispense:  60 capsule     Refill:  11     For treatment of Crohn's disease, I first recommended mesalamine enemas which would reach the areas of inflammation that " she had on last colonoscopy (sigmoid and rectum). She would like to try Apriso again first, she will take this as directed 4 capsules daily. For treatment of constipation, she will start taking Amitiza 8 mcg BID with meals. Samples were not available today. Amitiza is a secretory stimulant (chloride-channel activator) used to treat constipation by enhancing a chloride-rich intestinal fluid secretion via activating CIC-2. This increases motility in the intestine and thereby facilitates the passage of stool.     Nasrin Lizama was advised of the importance of weight loss due to finding of fatty liver infiltration. Body mass index is 30.29 kg/m². and her goal would be a BMI of 25 or less. She was advised to lose 10% of her body weight over the next 6 months. This is to be accomplished with a healthy diet (hypocaloric diet) and exercise. Replace fat with complex carbs and work on increasing fiber in the diet with more whole grains, fruits and vegetables. She was informed of the importance of good control of her glucose and cholesterol to help decrease her fatty liver infiltrate. Avoid all alcohol intake. She was advised that fatty infiltrate of the liver is not a benign condition and can lead to serious liver disease, even cirrhosis, possible liver transplant and hepatocellular carcinoma. She will have ultrasound of the liver and hepatic panel every 6 months for monitoring.    We will request records from PCP (recent labs) for review.        Return in about 6 weeks (around 10/10/2019) for recheck Crohn's.      Electronically signed 8/29/2019 3:43 PM  Kimber Le PA-C, [facility]

## 2019-09-03 ENCOUNTER — TELEPHONE (OUTPATIENT)
Dept: GASTROENTEROLOGY | Facility: CLINIC | Age: 59
End: 2019-09-03

## 2019-09-03 NOTE — TELEPHONE ENCOUNTER
Spoke with patient and let her know that her Amitiza was approved through her insurance and that she should be able to pick it up from her pharmacy. She voiced understanding.

## 2019-10-14 ENCOUNTER — TRANSCRIBE ORDERS (OUTPATIENT)
Dept: ADMINISTRATIVE | Facility: HOSPITAL | Age: 59
End: 2019-10-14

## 2019-10-14 ENCOUNTER — LAB (OUTPATIENT)
Dept: LAB | Facility: HOSPITAL | Age: 59
End: 2019-10-14

## 2019-10-14 DIAGNOSIS — I10 ESSENTIAL HYPERTENSION, BENIGN: ICD-10-CM

## 2019-10-14 DIAGNOSIS — N18.30 CHRONIC KIDNEY DISEASE, STAGE III (MODERATE) (HCC): ICD-10-CM

## 2019-10-14 DIAGNOSIS — I10 ESSENTIAL HYPERTENSION, BENIGN: Primary | ICD-10-CM

## 2019-10-14 DIAGNOSIS — N18.30 CHRONIC KIDNEY DISEASE, STAGE III (MODERATE) (HCC): Primary | ICD-10-CM

## 2019-10-14 PROCEDURE — 82570 ASSAY OF URINE CREATININE: CPT

## 2019-10-14 PROCEDURE — 84156 ASSAY OF PROTEIN URINE: CPT

## 2019-10-14 PROCEDURE — 36415 COLL VENOUS BLD VENIPUNCTURE: CPT

## 2019-10-14 PROCEDURE — 81001 URINALYSIS AUTO W/SCOPE: CPT

## 2019-10-14 PROCEDURE — 80053 COMPREHEN METABOLIC PANEL: CPT

## 2019-10-14 PROCEDURE — 85027 COMPLETE CBC AUTOMATED: CPT

## 2019-10-15 LAB
ALBUMIN SERPL-MCNC: 4.5 G/DL (ref 3.5–5.2)
ALBUMIN/GLOB SERPL: 1.6 G/DL
ALP SERPL-CCNC: 91 U/L (ref 39–117)
ALT SERPL W P-5'-P-CCNC: 61 U/L (ref 1–33)
ANION GAP SERPL CALCULATED.3IONS-SCNC: 7.5 MMOL/L (ref 5–15)
AST SERPL-CCNC: 74 U/L (ref 1–32)
BACTERIA UR QL AUTO: ABNORMAL /HPF
BILIRUB SERPL-MCNC: 0.4 MG/DL (ref 0.2–1.2)
BILIRUB UR QL STRIP: NEGATIVE
BUN BLD-MCNC: 15 MG/DL (ref 8–23)
BUN/CREAT SERPL: 13.3 (ref 7–25)
CALCIUM SPEC-SCNC: 9.2 MG/DL (ref 8.6–10.5)
CHLORIDE SERPL-SCNC: 92 MMOL/L (ref 98–107)
CLARITY UR: CLEAR
CO2 SERPL-SCNC: 30.5 MMOL/L (ref 22–29)
COLOR UR: YELLOW
CREAT BLD-MCNC: 1.13 MG/DL (ref 0.57–1)
CREAT UR-MCNC: 73.9 MG/DL
DEPRECATED RDW RBC AUTO: 42.9 FL (ref 37–54)
ERYTHROCYTE [DISTWIDTH] IN BLOOD BY AUTOMATED COUNT: 13.3 % (ref 12.3–15.4)
GFR SERPL CREATININE-BSD FRML MDRD: 49 ML/MIN/1.73
GLOBULIN UR ELPH-MCNC: 2.8 GM/DL
GLUCOSE BLD-MCNC: 240 MG/DL (ref 65–99)
GLUCOSE UR STRIP-MCNC: ABNORMAL MG/DL
HCT VFR BLD AUTO: 34.2 % (ref 34–46.6)
HGB BLD-MCNC: 11.2 G/DL (ref 12–15.9)
HGB UR QL STRIP.AUTO: ABNORMAL
HYALINE CASTS UR QL AUTO: ABNORMAL /LPF
KETONES UR QL STRIP: NEGATIVE
LEUKOCYTE ESTERASE UR QL STRIP.AUTO: ABNORMAL
MCH RBC QN AUTO: 28.7 PG (ref 26.6–33)
MCHC RBC AUTO-ENTMCNC: 32.7 G/DL (ref 31.5–35.7)
MCV RBC AUTO: 87.7 FL (ref 79–97)
NITRITE UR QL STRIP: NEGATIVE
PH UR STRIP.AUTO: 6 [PH] (ref 5–8)
PLATELET # BLD AUTO: 240 10*3/MM3 (ref 140–450)
PMV BLD AUTO: 11.9 FL (ref 6–12)
POTASSIUM BLD-SCNC: 4.5 MMOL/L (ref 3.5–5.2)
PROT SERPL-MCNC: 7.3 G/DL (ref 6–8.5)
PROT UR QL STRIP: ABNORMAL
PROT UR-MCNC: 36 MG/DL
PROT/CREAT UR: 487.1 MG/G CREA (ref 0–200)
RBC # BLD AUTO: 3.9 10*6/MM3 (ref 3.77–5.28)
RBC # UR: ABNORMAL /HPF
REF LAB TEST METHOD: ABNORMAL
SODIUM BLD-SCNC: 130 MMOL/L (ref 136–145)
SP GR UR STRIP: 1.02 (ref 1–1.03)
SQUAMOUS #/AREA URNS HPF: ABNORMAL /HPF
UROBILINOGEN UR QL STRIP: ABNORMAL
WBC NRBC COR # BLD: 4.37 10*3/MM3 (ref 3.4–10.8)
WBC UR QL AUTO: ABNORMAL /HPF

## 2019-10-23 ENCOUNTER — LAB (OUTPATIENT)
Dept: LAB | Facility: HOSPITAL | Age: 59
End: 2019-10-23

## 2019-10-23 ENCOUNTER — TRANSCRIBE ORDERS (OUTPATIENT)
Dept: ADMINISTRATIVE | Facility: HOSPITAL | Age: 59
End: 2019-10-23

## 2019-10-23 DIAGNOSIS — E87.1 HYPONATREMIA: ICD-10-CM

## 2019-10-23 DIAGNOSIS — E87.1 HYPONATREMIA: Primary | ICD-10-CM

## 2019-10-23 PROCEDURE — 36415 COLL VENOUS BLD VENIPUNCTURE: CPT

## 2019-10-23 PROCEDURE — 84300 ASSAY OF URINE SODIUM: CPT

## 2019-10-23 PROCEDURE — 83935 ASSAY OF URINE OSMOLALITY: CPT

## 2019-10-23 PROCEDURE — 80048 BASIC METABOLIC PNL TOTAL CA: CPT

## 2019-10-24 LAB
ANION GAP SERPL CALCULATED.3IONS-SCNC: 11.8 MMOL/L (ref 5–15)
BUN BLD-MCNC: 19 MG/DL (ref 8–23)
BUN/CREAT SERPL: 13.3 (ref 7–25)
CALCIUM SPEC-SCNC: 10.2 MG/DL (ref 8.6–10.5)
CHLORIDE SERPL-SCNC: 94 MMOL/L (ref 98–107)
CO2 SERPL-SCNC: 27.2 MMOL/L (ref 22–29)
CREAT BLD-MCNC: 1.43 MG/DL (ref 0.57–1)
GFR SERPL CREATININE-BSD FRML MDRD: 37 ML/MIN/1.73
GLUCOSE BLD-MCNC: 246 MG/DL (ref 65–99)
OSMOLALITY UR: 527 MOSM/KG
POTASSIUM BLD-SCNC: 5.1 MMOL/L (ref 3.5–5.2)
SODIUM BLD-SCNC: 133 MMOL/L (ref 136–145)
SODIUM UR-SCNC: 103 MMOL/L

## 2019-12-20 ENCOUNTER — TRANSCRIBE ORDERS (OUTPATIENT)
Dept: ADMINISTRATIVE | Facility: HOSPITAL | Age: 59
End: 2019-12-20

## 2019-12-20 ENCOUNTER — LAB (OUTPATIENT)
Dept: LAB | Facility: HOSPITAL | Age: 59
End: 2019-12-20

## 2019-12-20 DIAGNOSIS — E87.1 HYPONATREMIA: Primary | ICD-10-CM

## 2019-12-20 DIAGNOSIS — E87.1 HYPONATREMIA: ICD-10-CM

## 2019-12-20 PROCEDURE — 36415 COLL VENOUS BLD VENIPUNCTURE: CPT

## 2019-12-20 PROCEDURE — 80048 BASIC METABOLIC PNL TOTAL CA: CPT

## 2019-12-21 LAB
ANION GAP SERPL CALCULATED.3IONS-SCNC: 14.3 MMOL/L (ref 5–15)
BUN BLD-MCNC: 15 MG/DL (ref 8–23)
BUN/CREAT SERPL: 11.8 (ref 7–25)
CALCIUM SPEC-SCNC: 10.4 MG/DL (ref 8.6–10.5)
CHLORIDE SERPL-SCNC: 91 MMOL/L (ref 98–107)
CO2 SERPL-SCNC: 25.7 MMOL/L (ref 22–29)
CREAT BLD-MCNC: 1.27 MG/DL (ref 0.57–1)
GFR SERPL CREATININE-BSD FRML MDRD: 43 ML/MIN/1.73
GLUCOSE BLD-MCNC: 228 MG/DL (ref 65–99)
POTASSIUM BLD-SCNC: 5.3 MMOL/L (ref 3.5–5.2)
SODIUM BLD-SCNC: 131 MMOL/L (ref 136–145)

## 2020-01-21 ENCOUNTER — LAB (OUTPATIENT)
Dept: LAB | Facility: HOSPITAL | Age: 60
End: 2020-01-21

## 2020-01-21 ENCOUNTER — TRANSCRIBE ORDERS (OUTPATIENT)
Dept: ADMINISTRATIVE | Facility: HOSPITAL | Age: 60
End: 2020-01-21

## 2020-01-21 DIAGNOSIS — E11.9 DIABETES MELLITUS WITHOUT COMPLICATION (HCC): ICD-10-CM

## 2020-01-21 DIAGNOSIS — E11.9 DIABETES MELLITUS WITHOUT COMPLICATION (HCC): Primary | ICD-10-CM

## 2020-01-21 PROCEDURE — 36415 COLL VENOUS BLD VENIPUNCTURE: CPT

## 2020-01-21 PROCEDURE — 80048 BASIC METABOLIC PNL TOTAL CA: CPT

## 2020-01-22 LAB
ANION GAP SERPL CALCULATED.3IONS-SCNC: 16.1 MMOL/L (ref 5–15)
BUN BLD-MCNC: 21 MG/DL (ref 8–23)
BUN/CREAT SERPL: 14.6 (ref 7–25)
CALCIUM SPEC-SCNC: 10 MG/DL (ref 8.6–10.5)
CHLORIDE SERPL-SCNC: 96 MMOL/L (ref 98–107)
CO2 SERPL-SCNC: 23.9 MMOL/L (ref 22–29)
CREAT BLD-MCNC: 1.44 MG/DL (ref 0.57–1)
GFR SERPL CREATININE-BSD FRML MDRD: 37 ML/MIN/1.73
GLUCOSE BLD-MCNC: 233 MG/DL (ref 65–99)
POTASSIUM BLD-SCNC: 5.2 MMOL/L (ref 3.5–5.2)
SODIUM BLD-SCNC: 136 MMOL/L (ref 136–145)

## 2020-04-02 ENCOUNTER — TRANSCRIBE ORDERS (OUTPATIENT)
Dept: ADMINISTRATIVE | Facility: HOSPITAL | Age: 60
End: 2020-04-02

## 2020-04-02 ENCOUNTER — LAB (OUTPATIENT)
Dept: LAB | Facility: HOSPITAL | Age: 60
End: 2020-04-02

## 2020-04-02 DIAGNOSIS — N18.30 CHRONIC KIDNEY DISEASE, STAGE III (MODERATE) (HCC): ICD-10-CM

## 2020-04-02 DIAGNOSIS — N18.30 CHRONIC KIDNEY DISEASE, STAGE III (MODERATE) (HCC): Primary | ICD-10-CM

## 2020-04-02 DIAGNOSIS — E11.9 DIABETES MELLITUS WITHOUT COMPLICATION (HCC): ICD-10-CM

## 2020-04-02 PROCEDURE — 80053 COMPREHEN METABOLIC PANEL: CPT

## 2020-04-02 PROCEDURE — 81001 URINALYSIS AUTO W/SCOPE: CPT

## 2020-04-02 PROCEDURE — 84156 ASSAY OF PROTEIN URINE: CPT

## 2020-04-02 PROCEDURE — 36415 COLL VENOUS BLD VENIPUNCTURE: CPT

## 2020-04-02 PROCEDURE — 82570 ASSAY OF URINE CREATININE: CPT

## 2020-04-03 LAB
ALBUMIN SERPL-MCNC: 4.5 G/DL (ref 3.5–5.2)
ALBUMIN/GLOB SERPL: 1.5 G/DL
ALP SERPL-CCNC: 62 U/L (ref 39–117)
ALT SERPL W P-5'-P-CCNC: 40 U/L (ref 1–33)
ANION GAP SERPL CALCULATED.3IONS-SCNC: 14.5 MMOL/L (ref 5–15)
AST SERPL-CCNC: 56 U/L (ref 1–32)
BACTERIA UR QL AUTO: NORMAL /HPF
BILIRUB SERPL-MCNC: 0.3 MG/DL (ref 0.2–1.2)
BILIRUB UR QL STRIP: NEGATIVE
BUN BLD-MCNC: 14 MG/DL (ref 8–23)
BUN/CREAT SERPL: 11 (ref 7–25)
CALCIUM SPEC-SCNC: 9.6 MG/DL (ref 8.6–10.5)
CHLORIDE SERPL-SCNC: 92 MMOL/L (ref 98–107)
CLARITY UR: CLEAR
CO2 SERPL-SCNC: 23.5 MMOL/L (ref 22–29)
COLOR UR: YELLOW
CREAT BLD-MCNC: 1.27 MG/DL (ref 0.57–1)
CREAT UR-MCNC: 84.8 MG/DL
GFR SERPL CREATININE-BSD FRML MDRD: 43 ML/MIN/1.73
GLOBULIN UR ELPH-MCNC: 3.1 GM/DL
GLUCOSE BLD-MCNC: 191 MG/DL (ref 65–99)
GLUCOSE UR STRIP-MCNC: NEGATIVE MG/DL
HGB UR QL STRIP.AUTO: ABNORMAL
HYALINE CASTS UR QL AUTO: NORMAL /LPF
KETONES UR QL STRIP: NEGATIVE
LEUKOCYTE ESTERASE UR QL STRIP.AUTO: ABNORMAL
NITRITE UR QL STRIP: NEGATIVE
PH UR STRIP.AUTO: 6 [PH] (ref 5–8)
POTASSIUM BLD-SCNC: 4.9 MMOL/L (ref 3.5–5.2)
PROT SERPL-MCNC: 7.6 G/DL (ref 6–8.5)
PROT UR QL STRIP: ABNORMAL
PROT UR-MCNC: 35 MG/DL
PROT/CREAT UR: 412.7 MG/G CREA (ref 0–200)
RBC # UR: NORMAL /HPF
REF LAB TEST METHOD: NORMAL
SODIUM BLD-SCNC: 130 MMOL/L (ref 136–145)
SP GR UR STRIP: 1.02 (ref 1–1.03)
SQUAMOUS #/AREA URNS HPF: NORMAL /HPF
UROBILINOGEN UR QL STRIP: ABNORMAL
WBC UR QL AUTO: NORMAL /HPF

## 2020-06-16 ENCOUNTER — TRANSCRIBE ORDERS (OUTPATIENT)
Dept: ADMINISTRATIVE | Facility: HOSPITAL | Age: 60
End: 2020-06-16

## 2020-06-16 ENCOUNTER — LAB (OUTPATIENT)
Dept: LAB | Facility: HOSPITAL | Age: 60
End: 2020-06-16

## 2020-06-16 DIAGNOSIS — E87.1 HYPONATREMIA: ICD-10-CM

## 2020-06-16 DIAGNOSIS — N18.30 CHRONIC KIDNEY DISEASE, STAGE III (MODERATE) (HCC): ICD-10-CM

## 2020-06-16 DIAGNOSIS — E87.1: Primary | ICD-10-CM

## 2020-06-16 PROCEDURE — 83935 ASSAY OF URINE OSMOLALITY: CPT

## 2020-06-16 PROCEDURE — 84300 ASSAY OF URINE SODIUM: CPT

## 2020-06-16 PROCEDURE — 82533 TOTAL CORTISOL: CPT

## 2020-06-16 PROCEDURE — 84133 ASSAY OF URINE POTASSIUM: CPT

## 2020-06-16 PROCEDURE — 80053 COMPREHEN METABOLIC PANEL: CPT

## 2020-06-16 PROCEDURE — 82570 ASSAY OF URINE CREATININE: CPT

## 2020-06-16 PROCEDURE — 84443 ASSAY THYROID STIM HORMONE: CPT

## 2020-06-16 PROCEDURE — 81001 URINALYSIS AUTO W/SCOPE: CPT

## 2020-06-16 PROCEDURE — 82436 ASSAY OF URINE CHLORIDE: CPT

## 2020-06-16 PROCEDURE — 36415 COLL VENOUS BLD VENIPUNCTURE: CPT

## 2020-06-16 PROCEDURE — 84156 ASSAY OF PROTEIN URINE: CPT

## 2020-06-17 LAB
ALBUMIN SERPL-MCNC: 4.6 G/DL (ref 3.5–5.2)
ALBUMIN/GLOB SERPL: 1.5 G/DL
ALP SERPL-CCNC: 69 U/L (ref 39–117)
ALT SERPL W P-5'-P-CCNC: 36 U/L (ref 1–33)
ANION GAP SERPL CALCULATED.3IONS-SCNC: 10.4 MMOL/L (ref 5–15)
AST SERPL-CCNC: 39 U/L (ref 1–32)
BACTERIA UR QL AUTO: ABNORMAL /HPF
BILIRUB SERPL-MCNC: 0.4 MG/DL (ref 0.2–1.2)
BILIRUB UR QL STRIP: NEGATIVE
BUN BLD-MCNC: 17 MG/DL (ref 8–23)
BUN/CREAT SERPL: 11.8 (ref 7–25)
CALCIUM SPEC-SCNC: 10.9 MG/DL (ref 8.6–10.5)
CHLORIDE SERPL-SCNC: 94 MMOL/L (ref 98–107)
CHLORIDE UR-SCNC: 77 MMOL/L
CLARITY UR: CLEAR
CO2 SERPL-SCNC: 25.6 MMOL/L (ref 22–29)
COLOR UR: YELLOW
CORTIS SERPL-MCNC: 5.4 MCG/DL
CREAT BLD-MCNC: 1.44 MG/DL (ref 0.57–1)
CREAT UR-MCNC: 88.9 MG/DL
GFR SERPL CREATININE-BSD FRML MDRD: 37 ML/MIN/1.73
GLOBULIN UR ELPH-MCNC: 3 GM/DL
GLUCOSE BLD-MCNC: 254 MG/DL (ref 65–99)
GLUCOSE UR STRIP-MCNC: ABNORMAL MG/DL
HGB UR QL STRIP.AUTO: ABNORMAL
HYALINE CASTS UR QL AUTO: ABNORMAL /LPF
KETONES UR QL STRIP: NEGATIVE
LEUKOCYTE ESTERASE UR QL STRIP.AUTO: ABNORMAL
NITRITE UR QL STRIP: NEGATIVE
OSMOLALITY UR: 448 MOSM/KG
PH UR STRIP.AUTO: 5.5 [PH] (ref 5–8)
POTASSIUM BLD-SCNC: 5.2 MMOL/L (ref 3.5–5.2)
POTASSIUM UR-SCNC: 54.4 MMOL/L
PROT SERPL-MCNC: 7.6 G/DL (ref 6–8.5)
PROT UR QL STRIP: ABNORMAL
PROT UR-MCNC: 39 MG/DL
PROT/CREAT UR: 438.7 MG/G CREA (ref 0–200)
RBC # UR: ABNORMAL /HPF
REF LAB TEST METHOD: ABNORMAL
SODIUM BLD-SCNC: 130 MMOL/L (ref 136–145)
SODIUM UR-SCNC: 72 MMOL/L
SP GR UR STRIP: 1.02 (ref 1–1.03)
SQUAMOUS #/AREA URNS HPF: ABNORMAL /HPF
TSH SERPL DL<=0.05 MIU/L-ACNC: 4.55 UIU/ML (ref 0.27–4.2)
UROBILINOGEN UR QL STRIP: ABNORMAL
WBC UR QL AUTO: ABNORMAL /HPF

## 2020-07-23 ENCOUNTER — LAB (OUTPATIENT)
Dept: LAB | Facility: HOSPITAL | Age: 60
End: 2020-07-23

## 2020-07-23 ENCOUNTER — TRANSCRIBE ORDERS (OUTPATIENT)
Dept: ADMINISTRATIVE | Facility: HOSPITAL | Age: 60
End: 2020-07-23

## 2020-07-23 DIAGNOSIS — E87.1 HYPONATREMIA: ICD-10-CM

## 2020-07-23 DIAGNOSIS — R74.8 ACID PHOSPHATASE ELEVATED: Primary | ICD-10-CM

## 2020-07-23 DIAGNOSIS — R74.8 ACID PHOSPHATASE ELEVATED: ICD-10-CM

## 2020-07-23 DIAGNOSIS — Z79.899 ENCOUNTER FOR LONG-TERM (CURRENT) USE OF OTHER MEDICATIONS: ICD-10-CM

## 2020-07-23 DIAGNOSIS — D64.9 ANEMIA, UNSPECIFIED TYPE: ICD-10-CM

## 2020-07-23 DIAGNOSIS — E83.52 HYPERCALCEMIA: ICD-10-CM

## 2020-07-23 DIAGNOSIS — N18.30 CHRONIC KIDNEY DISEASE, STAGE III (MODERATE) (HCC): ICD-10-CM

## 2020-07-23 DIAGNOSIS — E11.69 TYPE 2 DIABETES MELLITUS WITH OTHER SPECIFIED COMPLICATION, UNSPECIFIED WHETHER LONG TERM INSULIN USE (HCC): ICD-10-CM

## 2020-07-23 DIAGNOSIS — E87.1 HYPOSMOLALITY SYNDROME: ICD-10-CM

## 2020-07-23 DIAGNOSIS — E87.1 HYPONATREMIA: Primary | ICD-10-CM

## 2020-07-23 DIAGNOSIS — N18.30 CHRONIC KIDNEY DISEASE, STAGE III (MODERATE) (HCC): Primary | ICD-10-CM

## 2020-07-23 DIAGNOSIS — K76.0 FATTY METAMORPHOSIS OF LIVER: ICD-10-CM

## 2020-07-23 PROCEDURE — 83516 IMMUNOASSAY NONANTIBODY: CPT

## 2020-07-23 PROCEDURE — 82330 ASSAY OF CALCIUM: CPT

## 2020-07-23 PROCEDURE — 86334 IMMUNOFIX E-PHORESIS SERUM: CPT

## 2020-07-23 PROCEDURE — 83540 ASSAY OF IRON: CPT

## 2020-07-23 PROCEDURE — 86038 ANTINUCLEAR ANTIBODIES: CPT

## 2020-07-23 PROCEDURE — 82103 ALPHA-1-ANTITRYPSIN TOTAL: CPT

## 2020-07-23 PROCEDURE — 80061 LIPID PANEL: CPT

## 2020-07-23 PROCEDURE — 85025 COMPLETE CBC W/AUTO DIFF WBC: CPT

## 2020-07-23 PROCEDURE — 36415 COLL VENOUS BLD VENIPUNCTURE: CPT

## 2020-07-23 PROCEDURE — 83970 ASSAY OF PARATHORMONE: CPT

## 2020-07-23 PROCEDURE — 84436 ASSAY OF TOTAL THYROXINE: CPT

## 2020-07-23 PROCEDURE — 84466 ASSAY OF TRANSFERRIN: CPT

## 2020-07-23 PROCEDURE — 84165 PROTEIN E-PHORESIS SERUM: CPT

## 2020-07-23 PROCEDURE — 80053 COMPREHEN METABOLIC PANEL: CPT

## 2020-07-23 PROCEDURE — 82248 BILIRUBIN DIRECT: CPT

## 2020-07-23 PROCEDURE — 84480 ASSAY TRIIODOTHYRONINE (T3): CPT

## 2020-07-23 PROCEDURE — 82784 ASSAY IGA/IGD/IGG/IGM EACH: CPT

## 2020-07-23 PROCEDURE — 82728 ASSAY OF FERRITIN: CPT

## 2020-07-23 PROCEDURE — 82607 VITAMIN B-12: CPT

## 2020-07-23 PROCEDURE — 84443 ASSAY THYROID STIM HORMONE: CPT

## 2020-07-23 PROCEDURE — 84155 ASSAY OF PROTEIN SERUM: CPT

## 2020-07-23 PROCEDURE — 82164 ANGIOTENSIN I ENZYME TEST: CPT

## 2020-07-24 LAB
ACE SERPL-CCNC: 65 U/L (ref 14–82)
ALBUMIN SERPL-MCNC: 3.9 G/DL (ref 2.9–4.4)
ALBUMIN SERPL-MCNC: 4.5 G/DL (ref 3.5–5.2)
ALBUMIN/GLOB SERPL: 1.3 {RATIO} (ref 0.7–1.7)
ALBUMIN/GLOB SERPL: 1.5 G/DL
ALP SERPL-CCNC: 55 U/L (ref 39–117)
ALPHA1 GLOB FLD ELPH-MCNC: 0.3 G/DL (ref 0–0.4)
ALPHA1 GLOB MFR UR ELPH: 163 MG/DL (ref 90–200)
ALPHA2 GLOB SERPL ELPH-MCNC: 0.9 G/DL (ref 0.4–1)
ALT SERPL W P-5'-P-CCNC: 37 U/L (ref 1–33)
ANA SER QL: NEGATIVE
ANION GAP SERPL CALCULATED.3IONS-SCNC: 9.5 MMOL/L (ref 5–15)
AST SERPL-CCNC: 46 U/L (ref 1–32)
B-GLOBULIN SERPL ELPH-MCNC: 1.2 G/DL (ref 0.7–1.3)
BASOPHILS # BLD AUTO: 0.04 10*3/MM3 (ref 0–0.2)
BASOPHILS NFR BLD AUTO: 1.1 % (ref 0–1.5)
BILIRUB CONJ SERPL-MCNC: 0.2 MG/DL (ref 0–0.3)
BILIRUB SERPL-MCNC: 0.4 MG/DL (ref 0–1.2)
BUN SERPL-MCNC: 13 MG/DL (ref 8–23)
BUN/CREAT SERPL: 9.4 (ref 7–25)
CA-I BLD-MCNC: 5 MG/DL (ref 4.6–5.4)
CA-I SERPL ISE-MCNC: 1.25 MMOL/L (ref 1.15–1.35)
CALCIUM SPEC-SCNC: 9.8 MG/DL (ref 8.6–10.5)
CHLORIDE SERPL-SCNC: 98 MMOL/L (ref 98–107)
CHOLEST SERPL-MCNC: 100 MG/DL (ref 0–200)
CO2 SERPL-SCNC: 25.5 MMOL/L (ref 22–29)
CREAT SERPL-MCNC: 1.39 MG/DL (ref 0.57–1)
DEPRECATED MITOCHONDRIA M2 IGG SER-ACNC: <20 UNITS (ref 0–20)
DEPRECATED RDW RBC AUTO: 41.4 FL (ref 37–54)
EOSINOPHIL # BLD AUTO: 0.15 10*3/MM3 (ref 0–0.4)
EOSINOPHIL NFR BLD AUTO: 4.1 % (ref 0.3–6.2)
ERYTHROCYTE [DISTWIDTH] IN BLOOD BY AUTOMATED COUNT: 13.1 % (ref 12.3–15.4)
FERRITIN SERPL-MCNC: 66.4 NG/ML (ref 13–150)
GAMMA GLOB SERPL ELPH-MCNC: 0.9 G/DL (ref 0.4–1.8)
GFR SERPL CREATININE-BSD FRML MDRD: 38 ML/MIN/1.73
GLOBULIN SER CALC-MCNC: 3.2 G/DL (ref 2.2–3.9)
GLOBULIN UR ELPH-MCNC: 3 GM/DL
GLUCOSE SERPL-MCNC: 160 MG/DL (ref 65–99)
HCT VFR BLD AUTO: 35.1 % (ref 34–46.6)
HDLC SERPL-MCNC: 21 MG/DL (ref 40–60)
HGB BLD-MCNC: 11.7 G/DL (ref 12–15.9)
IGA SERPL-MCNC: 204 MG/DL (ref 87–352)
IGG SERPL-MCNC: 1018 MG/DL (ref 586–1602)
IGM SERPL-MCNC: 75 MG/DL (ref 26–217)
IMM GRANULOCYTES # BLD AUTO: 0.01 10*3/MM3 (ref 0–0.05)
IMM GRANULOCYTES NFR BLD AUTO: 0.3 % (ref 0–0.5)
INTERPRETATION SERPL IEP-IMP: NORMAL
IRON 24H UR-MRATE: 69 MCG/DL (ref 37–145)
IRON SATN MFR SERPL: 15 % (ref 20–50)
LDLC SERPL CALC-MCNC: 29 MG/DL (ref 0–100)
LDLC/HDLC SERPL: 1.38 {RATIO}
LYMPHOCYTES # BLD AUTO: 0.79 10*3/MM3 (ref 0.7–3.1)
LYMPHOCYTES NFR BLD AUTO: 21.6 % (ref 19.6–45.3)
Lab: NORMAL
M-SPIKE: NORMAL G/DL
MCH RBC QN AUTO: 28.7 PG (ref 26.6–33)
MCHC RBC AUTO-ENTMCNC: 33.3 G/DL (ref 31.5–35.7)
MCV RBC AUTO: 86.2 FL (ref 79–97)
MONOCYTES # BLD AUTO: 0.38 10*3/MM3 (ref 0.1–0.9)
MONOCYTES NFR BLD AUTO: 10.4 % (ref 5–12)
NEUTROPHILS NFR BLD AUTO: 2.29 10*3/MM3 (ref 1.7–7)
NEUTROPHILS NFR BLD AUTO: 62.5 % (ref 42.7–76)
NRBC BLD AUTO-RTO: 0 /100 WBC (ref 0–0.2)
PLATELET # BLD AUTO: 204 10*3/MM3 (ref 140–450)
PMV BLD AUTO: 11.6 FL (ref 6–12)
POTASSIUM SERPL-SCNC: 4.7 MMOL/L (ref 3.5–5.2)
PROT SERPL-MCNC: 7.1 G/DL (ref 6–8.5)
PROT SERPL-MCNC: 7.5 G/DL (ref 6–8.5)
PTH-INTACT SERPL-MCNC: 57.1 PG/ML (ref 15–65)
RBC # BLD AUTO: 4.07 10*6/MM3 (ref 3.77–5.28)
SODIUM SERPL-SCNC: 133 MMOL/L (ref 136–145)
T3 SERPL-MCNC: 105 NG/DL (ref 80–200)
T4 SERPL-MCNC: 6.66 MCG/DL (ref 4.5–11.7)
TIBC SERPL-MCNC: 466 MCG/DL (ref 298–536)
TRANSFERRIN SERPL-MCNC: 313 MG/DL (ref 200–360)
TRIGL SERPL-MCNC: 250 MG/DL (ref 0–150)
TSH SERPL DL<=0.05 MIU/L-ACNC: 3.68 UIU/ML (ref 0.27–4.2)
VIT B12 BLD-MCNC: 493 PG/ML (ref 211–946)
VLDLC SERPL-MCNC: 50 MG/DL (ref 5–40)
WBC # BLD AUTO: 3.66 10*3/MM3 (ref 3.4–10.8)

## 2020-08-19 ENCOUNTER — TRANSCRIBE ORDERS (OUTPATIENT)
Dept: ADMINISTRATIVE | Facility: HOSPITAL | Age: 60
End: 2020-08-19

## 2020-08-19 ENCOUNTER — LAB (OUTPATIENT)
Dept: LAB | Facility: HOSPITAL | Age: 60
End: 2020-08-19

## 2020-08-19 DIAGNOSIS — N18.30 CHRONIC KIDNEY DISEASE, STAGE III (MODERATE) (HCC): ICD-10-CM

## 2020-08-19 DIAGNOSIS — E83.52 HYPERCALCEMIA: ICD-10-CM

## 2020-08-19 DIAGNOSIS — E83.2 DISORDER OF ZINC METABOLISM: ICD-10-CM

## 2020-08-19 DIAGNOSIS — N18.30 CHRONIC KIDNEY DISEASE, STAGE III (MODERATE) (HCC): Primary | ICD-10-CM

## 2020-08-19 PROCEDURE — 84156 ASSAY OF PROTEIN URINE: CPT

## 2020-08-19 PROCEDURE — 81001 URINALYSIS AUTO W/SCOPE: CPT

## 2020-08-19 PROCEDURE — 82570 ASSAY OF URINE CREATININE: CPT

## 2020-08-20 LAB
BACTERIA UR QL AUTO: NORMAL /HPF
BILIRUB UR QL STRIP: NEGATIVE
CLARITY UR: CLEAR
COLOR UR: YELLOW
CREAT UR-MCNC: 70.2 MG/DL
GLUCOSE UR STRIP-MCNC: NEGATIVE MG/DL
HGB UR QL STRIP.AUTO: ABNORMAL
HYALINE CASTS UR QL AUTO: NORMAL /LPF
KETONES UR QL STRIP: NEGATIVE
LEUKOCYTE ESTERASE UR QL STRIP.AUTO: NEGATIVE
NITRITE UR QL STRIP: NEGATIVE
PH UR STRIP.AUTO: 6 [PH] (ref 5–8)
PROT UR QL STRIP: ABNORMAL
PROT UR-MCNC: 26 MG/DL
PROT/CREAT UR: 370.4 MG/G CREA (ref 0–200)
RBC # UR: NORMAL /HPF
REF LAB TEST METHOD: NORMAL
SP GR UR STRIP: 1.01 (ref 1–1.03)
SQUAMOUS #/AREA URNS HPF: NORMAL /HPF
UROBILINOGEN UR QL STRIP: ABNORMAL
WBC UR QL AUTO: NORMAL /HPF

## 2021-02-10 ENCOUNTER — LAB (OUTPATIENT)
Dept: LAB | Facility: HOSPITAL | Age: 61
End: 2021-02-10

## 2021-02-10 ENCOUNTER — TRANSCRIBE ORDERS (OUTPATIENT)
Dept: ADMINISTRATIVE | Facility: HOSPITAL | Age: 61
End: 2021-02-10

## 2021-02-10 DIAGNOSIS — N18.30 MALIGNANT HYPERTENSIVE HEART AND CHRONIC KIDNEY DISEASE STAGE III (HCC): ICD-10-CM

## 2021-02-10 DIAGNOSIS — E83.52 HYPERCALCEMIA: ICD-10-CM

## 2021-02-10 DIAGNOSIS — I13.10 MALIGNANT HYPERTENSIVE HEART AND CHRONIC KIDNEY DISEASE STAGE III (HCC): Primary | ICD-10-CM

## 2021-02-10 DIAGNOSIS — N18.30 MALIGNANT HYPERTENSIVE HEART AND CHRONIC KIDNEY DISEASE STAGE III (HCC): Primary | ICD-10-CM

## 2021-02-10 DIAGNOSIS — I13.10 MALIGNANT HYPERTENSIVE HEART AND CHRONIC KIDNEY DISEASE STAGE III (HCC): ICD-10-CM

## 2021-02-10 LAB
ALBUMIN SERPL-MCNC: 4.48 G/DL (ref 3.5–5.2)
ALBUMIN/GLOB SERPL: 1.5 G/DL
ALP SERPL-CCNC: 93 U/L (ref 39–117)
ALT SERPL W P-5'-P-CCNC: 46 U/L (ref 1–33)
ANION GAP SERPL CALCULATED.3IONS-SCNC: 11.3 MMOL/L (ref 5–15)
AST SERPL-CCNC: 47 U/L (ref 1–32)
BACTERIA UR QL AUTO: ABNORMAL /HPF
BILIRUB SERPL-MCNC: 0.4 MG/DL (ref 0–1.2)
BILIRUB UR QL STRIP: NEGATIVE
BUN SERPL-MCNC: 16 MG/DL (ref 8–23)
BUN/CREAT SERPL: 11.7 (ref 7–25)
CALCIUM SPEC-SCNC: 9.7 MG/DL (ref 8.6–10.5)
CHLORIDE SERPL-SCNC: 100 MMOL/L (ref 98–107)
CLARITY UR: CLEAR
CO2 SERPL-SCNC: 25.7 MMOL/L (ref 22–29)
COLOR UR: YELLOW
CREAT SERPL-MCNC: 1.37 MG/DL (ref 0.57–1)
GFR SERPL CREATININE-BSD FRML MDRD: 39 ML/MIN/1.73
GLOBULIN UR ELPH-MCNC: 3 GM/DL
GLUCOSE SERPL-MCNC: 190 MG/DL (ref 65–99)
GLUCOSE UR STRIP-MCNC: NEGATIVE MG/DL
HGB UR QL STRIP.AUTO: ABNORMAL
HYALINE CASTS UR QL AUTO: ABNORMAL /LPF
KETONES UR QL STRIP: NEGATIVE
LEUKOCYTE ESTERASE UR QL STRIP.AUTO: ABNORMAL
NITRITE UR QL STRIP: NEGATIVE
PH UR STRIP.AUTO: 6.5 [PH] (ref 5–8)
POTASSIUM SERPL-SCNC: 4.5 MMOL/L (ref 3.5–5.2)
PROT SERPL-MCNC: 7.5 G/DL (ref 6–8.5)
PROT UR QL STRIP: ABNORMAL
RBC # UR: ABNORMAL /HPF
REF LAB TEST METHOD: ABNORMAL
SODIUM SERPL-SCNC: 137 MMOL/L (ref 136–145)
SP GR UR STRIP: 1.01 (ref 1–1.03)
SQUAMOUS #/AREA URNS HPF: ABNORMAL /HPF
UROBILINOGEN UR QL STRIP: ABNORMAL
WBC UR QL AUTO: ABNORMAL /HPF

## 2021-02-10 PROCEDURE — 80053 COMPREHEN METABOLIC PANEL: CPT

## 2021-02-10 PROCEDURE — 84156 ASSAY OF PROTEIN URINE: CPT

## 2021-02-10 PROCEDURE — 36415 COLL VENOUS BLD VENIPUNCTURE: CPT

## 2021-02-10 PROCEDURE — 81001 URINALYSIS AUTO W/SCOPE: CPT

## 2021-02-10 PROCEDURE — 82570 ASSAY OF URINE CREATININE: CPT

## 2021-02-11 LAB
CREAT UR-MCNC: 36.5 MG/DL
PROT UR-MCNC: 31 MG/DL
PROT/CREAT UR: 849.3 MG/G CREA (ref 0–200)

## 2021-11-17 ENCOUNTER — OFFICE VISIT (OUTPATIENT)
Dept: ORTHOPEDIC SURGERY | Facility: CLINIC | Age: 61
End: 2021-11-17

## 2021-11-17 VITALS
HEIGHT: 67 IN | BODY MASS INDEX: 30.35 KG/M2 | DIASTOLIC BLOOD PRESSURE: 72 MMHG | WEIGHT: 193.34 LBS | SYSTOLIC BLOOD PRESSURE: 143 MMHG | HEART RATE: 104 BPM | TEMPERATURE: 99.4 F

## 2021-11-17 DIAGNOSIS — L03.113 CELLULITIS OF RIGHT ELBOW: ICD-10-CM

## 2021-11-17 DIAGNOSIS — S50.01XA CONTUSION OF RIGHT ELBOW, INITIAL ENCOUNTER: Primary | ICD-10-CM

## 2021-11-17 PROCEDURE — 87205 SMEAR GRAM STAIN: CPT | Performed by: ORTHOPAEDIC SURGERY

## 2021-11-17 PROCEDURE — 87186 SC STD MICRODIL/AGAR DIL: CPT | Performed by: ORTHOPAEDIC SURGERY

## 2021-11-17 PROCEDURE — 99203 OFFICE O/P NEW LOW 30 MIN: CPT | Performed by: ORTHOPAEDIC SURGERY

## 2021-11-17 PROCEDURE — 87147 CULTURE TYPE IMMUNOLOGIC: CPT | Performed by: ORTHOPAEDIC SURGERY

## 2021-11-17 PROCEDURE — 87070 CULTURE OTHR SPECIMN AEROBIC: CPT | Performed by: ORTHOPAEDIC SURGERY

## 2021-11-17 RX ORDER — BUDESONIDE AND FORMOTEROL FUMARATE DIHYDRATE 80; 4.5 UG/1; UG/1
2 AEROSOL RESPIRATORY (INHALATION)
COMMUNITY

## 2021-11-17 NOTE — PROGRESS NOTES
New Patient Visit      Patient: Nasrin Lizama  YOB: 1960  Date of Encounter: 11/17/2021        Chief Complaint:   Chief Complaint   Patient presents with   • Right Elbow - Edema, Initial Evaluation           HPI:   Nasrin Lizama, 61 y.o. female, referred by Tru BUI at Urgent Care, presents for evaluation of right elbow injury sustained on February 11, 2021 she fell wrecking her elbow against a cabinet.  He did have eating immediately following and small laceration.  She did not go to the emergency room until 4 days later November 15, 2021 at urgent care she was started on Keflex 500 mg 3 times daily.  She reports her elbow is about the same.  Complains of moderate pain generalized about the elbow worse with flexion extension.  Denies numbness to her right arm.  Her past medical history is remarkable for Crohn's disease and diabetes.  He denies allergy to sulfa drugs.  She does have chronic kidney disease    Active Problem List:  Patient Active Problem List   Diagnosis   • Constipation   • Nausea   • Irritable bowel syndrome without diarrhea   • Abdominal bloating   • Hematochezia   • External hemorrhoids   • Heartburn   • Generalized abdominal pain   • Therapeutic opioid induced constipation   • Fatty liver   • Right upper quadrant abdominal pain   • Epigastric pain   • Left buttock abscess         Past Medical History:  Past Medical History:   Diagnosis Date   • Anxiety    • Arthritis    • Back pain    • Chronic kidney disease    • COPD (chronic obstructive pulmonary disease) (HCC)    • Crohn's disease (HCC)    • Depression    • Diabetes mellitus (HCC)    • H/O colonoscopy          Past Surgical History:  Past Surgical History:   Procedure Laterality Date   • APPENDECTOMY     • BREAST BIOPSY Right 2014    benign   • BREAST BIOPSY Right 2001    benign   • COLONOSCOPY  2016   • COLONOSCOPY N/A 6/22/2016    Procedure: COLONOSCOPY;  Surgeon: Junior Carver III, MD;   Location:  [facility] OR;  Service:    • COLONOSCOPY N/A 2/15/2018    Procedure: COLONOSCOPY  CPTCODE:23944;  Surgeon: Junior Carver III, MD;  [facility] OR;  Service:    • ENDOSCOPY N/A 6/22/2016    Procedure: ESOPHAGOGASTRODUODENOSCOPY W/ BIOPSY;  Surgeon: Junior Carver III, MD;  Location:  [facility] OR;  Service:    • ENDOSCOPY N/A 2/15/2018    Procedure: ESOPHAGOGASTRODUODENOSCOPY WITH BIOPSY  CPTCODE:17705;  Surgeon: Junior Carver III, MD;  Location: [facility] OR;  Service:    • HYSTERECTOMY     • NOSE SURGERY     • TUBAL ABDOMINAL LIGATION     • UPPER GASTROINTESTINAL ENDOSCOPY           Family History:  Family History   Problem Relation Age of Onset   • Heart attack Mother         acute myocard infarction   • Cancer Other         cancer and breast cancer   • Diabetes Other    • Heart disease Other    • Multiple myeloma Other    • Breast cancer Maternal Aunt    • Breast cancer Maternal Aunt    • Cancer Father    • Multiple myeloma Father          Social History:  Social History     Socioeconomic History   • Marital status:    Tobacco Use   • Smoking status: Current Every Day Smoker     Packs/day: 1.00     Years: 15.00     Pack years: 15.00     Types: Cigarettes   • Smokeless tobacco: Never Used   Substance and Sexual Activity   • Alcohol use: No   • Drug use: No   • Sexual activity: Defer     Body mass index is 30.27 kg/m².      Medications:  Current Outpatient Medications   Medication Sig Dispense Refill   • budesonide-formoterol (SYMBICORT) 80-4.5 MCG/ACT inhaler Inhale 2 puffs 2 (Two) Times a Day.     • fenofibrate (TRICOR) 145 MG tablet Take 145 mg by mouth daily.     • glimepiride (AMARYL) 4 MG tablet Take 4 mg by mouth every morning before breakfast.     • hydrOXYzine (ATARAX) 25 MG tablet      • insulin glargine (LANTUS) 100 UNIT/ML injection Inject  under the skin daily.     • lubiprostone (AMITIZA) 8 MCG capsule Take 1 capsule by mouth 2 (Two) Times a Day With Meals. 60 capsule 11    • mesalamine (APRISO) 0.375 g 24 hr capsule Take 4 capsules once daily 120 capsule 5   • NOVOLOG FLEXPEN 100 UNIT/ML solution pen-injector sc pen      • oxyCODONE-acetaminophen (PERCOCET) 7.5-325 MG per tablet Take 1 tablet by mouth every 6 (six) hours as needed.     • pantoprazole (PROTONIX) 20 MG EC tablet Take 1 tablet by mouth Daily. 30 tablet 5   • PARoxetine (PAXIL) 20 MG tablet Take 1 tablet by mouth daily. As directed.     • pregabalin (LYRICA) 150 MG capsule Take 150 mg by mouth 2 (two) times a day.     • rosuvastatin (CRESTOR) 10 MG tablet Take 10 mg by mouth daily.     • tiotropium (SPIRIVA) 18 MCG per inhalation capsule Place 1 capsule into inhaler and inhale Daily.     • clonazePAM (KlonoPIN) 1 MG tablet Take 1 mg by mouth 2 (two) times a day as needed for seizures.     • pravastatin (PRAVACHOL) 40 MG tablet Take 1 tablet by mouth daily.     • sulfamethoxazole-trimethoprim (Bactrim DS) 800-160 MG per tablet Take 1 tablet by mouth 2 (Two) Times a Day for 10 days. 20 tablet 0     No current facility-administered medications for this visit.         Allergies:  No Known Allergies      Review of Systems:   Review of Systems   Constitutional: Positive for chills and fever.   HENT: Positive for sinus pain.    Eyes: Positive for pain.   Respiratory: Positive for cough, shortness of breath and wheezing.    Cardiovascular: Positive for leg swelling.   Gastrointestinal: Positive for abdominal pain, constipation and nausea.   Endocrine: Negative.    Genitourinary: Negative.    Musculoskeletal: Positive for arthralgias, back pain, joint swelling and neck pain.   Skin: Positive for wound.   Allergic/Immunologic: Negative.    Neurological: Positive for numbness.   Hematological: Negative.    Psychiatric/Behavioral: The patient is nervous/anxious.          Physical Exam:   Physical Exam  GENERAL: 61 y.o. female, alert and oriented X 3 in no acute distress.   Visit Vitals  /72   Pulse 104   Temp 99.4 °F (37.4  "°C)   Ht 170.2 cm (67.01\")   Wt 87.7 kg (193 lb 5.5 oz)   BMI 30.27 kg/m²         Musculoskeletal:   Examination right upper extremity reveals generalized swelling and mild erythema over the posterior aspect right elbow with 1.5 cm longitudinal laceration.  Minimal drainage is present.  Upon motion is full neurovascular exam grossly intact.        Radiology/Labs:     Radiographs right elbow by report are negative.        Assessment & Plan:   61 y.o. female presents significant trauma to right elbow with contusion and ricky laceration without active drainage.  We will continue with her Keflex 500 mg 3 times daily we will reevaluate on Monday, today cultures of her right elbow wound are taken.  Presentation may require conversion to Bactrim depending on susceptibility of organisms isolated.        ICD-10-CM ICD-9-CM   1. Contusion of right elbow, initial encounter  S50.01XA 923.11   2. Cellulitis of right elbow  L03.113 682.3           Cc:   Jackie Lr, APRN                This document has been electronically signed by Anoop Valencia MD   November 18, 2021 16:45 EST      "

## 2021-11-18 ENCOUNTER — TELEPHONE (OUTPATIENT)
Dept: ORTHOPEDIC SURGERY | Facility: CLINIC | Age: 61
End: 2021-11-18

## 2021-11-18 RX ORDER — SULFAMETHOXAZOLE AND TRIMETHOPRIM 800; 160 MG/1; MG/1
1 TABLET ORAL 2 TIMES DAILY
Qty: 20 TABLET | Refills: 0 | Status: SHIPPED | OUTPATIENT
Start: 2021-11-18 | End: 2021-11-28

## 2021-11-19 LAB
BACTERIA SPEC AEROBE CULT: ABNORMAL
GRAM STN SPEC: ABNORMAL
GRAM STN SPEC: ABNORMAL

## 2021-11-22 ENCOUNTER — OFFICE VISIT (OUTPATIENT)
Dept: ORTHOPEDIC SURGERY | Facility: CLINIC | Age: 61
End: 2021-11-22

## 2021-11-22 ENCOUNTER — PATIENT ROUNDING (BHMG ONLY) (OUTPATIENT)
Dept: ORTHOPEDIC SURGERY | Facility: CLINIC | Age: 61
End: 2021-11-22

## 2021-11-22 VITALS — BODY MASS INDEX: 30.35 KG/M2 | WEIGHT: 193.34 LBS | HEIGHT: 67 IN

## 2021-11-22 DIAGNOSIS — L03.113 CELLULITIS OF RIGHT ELBOW: Primary | ICD-10-CM

## 2021-11-22 PROCEDURE — 99213 OFFICE O/P EST LOW 20 MIN: CPT | Performed by: ORTHOPAEDIC SURGERY

## 2021-11-22 NOTE — PROGRESS NOTES
November 22, 2021    Hello, may I speak with Nasrin Lizama?    My name is Heath Escobar.       I am  with [facility] [place]  [facility]  [address]  [place] [address] [phone].    Before we get started may I verify your date of birth? 1960    I am calling to officially welcome you to our practice and ask about your recent visit. Is this a good time to talk? yes    Tell me about your visit with us. What things went well?  Everything went great! You all did a great job.        We're always looking for ways to make our patients' experiences even better. Do you have recommendations on ways we may improve?  no    Overall were you satisfied with your first visit to our practice? yes       I appreciate you taking the time to speak with me today. Is there anything else I can do for you? no      Thank you, and have a great day.

## 2021-11-22 NOTE — PROGRESS NOTES
Follow-up Visit         Patient: Nasrin Lizama  YOB: 1960  Date of Encounter: 11/22/2021      Chief  Complaint:   Chief Complaint   Patient presents with   • Right Elbow - Follow-up, Pain         HPI:  Nasrin Lizama, 61 y.o. female presents follow-up for laceration to right elbow cultures identifying MRSA she was converted to Bactrim on November 18, 2021 reports that she is doing well.      Medical History:  Patient Active Problem List   Diagnosis   • Constipation   • Nausea   • Irritable bowel syndrome without diarrhea   • Abdominal bloating   • Hematochezia   • External hemorrhoids   • Heartburn   • Generalized abdominal pain   • Therapeutic opioid induced constipation   • Fatty liver   • Right upper quadrant abdominal pain   • Epigastric pain   • Left buttock abscess     Past Medical History:   Diagnosis Date   • Anxiety    • Arthritis    • Back pain    • Chronic kidney disease    • COPD (chronic obstructive pulmonary disease) (HCC)    • Crohn's disease (HCC)    • Depression    • Diabetes mellitus (HCC)    • H/O colonoscopy          Social History:  Social History     Socioeconomic History   • Marital status:    Tobacco Use   • Smoking status: Current Every Day Smoker     Packs/day: 1.00     Years: 15.00     Pack years: 15.00     Types: Cigarettes   • Smokeless tobacco: Never Used   Substance and Sexual Activity   • Alcohol use: No   • Drug use: No   • Sexual activity: Defer         Current Medications:    Current Outpatient Medications:   •  budesonide-formoterol (SYMBICORT) 80-4.5 MCG/ACT inhaler, Inhale 2 puffs 2 (Two) Times a Day., Disp: , Rfl:   •  clonazePAM (KlonoPIN) 1 MG tablet, Take 1 mg by mouth 2 (two) times a day as needed for seizures., Disp: , Rfl:   •  fenofibrate (TRICOR) 145 MG tablet, Take 145 mg by mouth daily., Disp: , Rfl:   •  glimepiride (AMARYL) 4 MG tablet, Take 4 mg by mouth every morning before breakfast., Disp: , Rfl:   •  hydrOXYzine (ATARAX) 25 MG  tablet, , Disp: , Rfl:   •  insulin glargine (LANTUS) 100 UNIT/ML injection, Inject  under the skin daily., Disp: , Rfl:   •  lubiprostone (AMITIZA) 8 MCG capsule, Take 1 capsule by mouth 2 (Two) Times a Day With Meals., Disp: 60 capsule, Rfl: 11  •  mesalamine (APRISO) 0.375 g 24 hr capsule, Take 4 capsules once daily, Disp: 120 capsule, Rfl: 5  •  NOVOLOG FLEXPEN 100 UNIT/ML solution pen-injector sc pen, , Disp: , Rfl:   •  oxyCODONE-acetaminophen (PERCOCET) 7.5-325 MG per tablet, Take 1 tablet by mouth every 6 (six) hours as needed., Disp: , Rfl:   •  pantoprazole (PROTONIX) 20 MG EC tablet, Take 1 tablet by mouth Daily., Disp: 30 tablet, Rfl: 5  •  PARoxetine (PAXIL) 20 MG tablet, Take 1 tablet by mouth daily. As directed., Disp: , Rfl:   •  pravastatin (PRAVACHOL) 40 MG tablet, Take 1 tablet by mouth daily., Disp: , Rfl:   •  pregabalin (LYRICA) 150 MG capsule, Take 150 mg by mouth 2 (two) times a day., Disp: , Rfl:   •  rosuvastatin (CRESTOR) 10 MG tablet, Take 10 mg by mouth daily., Disp: , Rfl:   •  sulfamethoxazole-trimethoprim (Bactrim DS) 800-160 MG per tablet, Take 1 tablet by mouth 2 (Two) Times a Day for 10 days., Disp: 20 tablet, Rfl: 0  •  tiotropium (SPIRIVA) 18 MCG per inhalation capsule, Place 1 capsule into inhaler and inhale Daily., Disp: , Rfl:       Allergies:  No Known Allergies      Family History:  Family History   Problem Relation Age of Onset   • Heart attack Mother         acute myocard infarction   • Cancer Other         cancer and breast cancer   • Diabetes Other    • Heart disease Other    • Multiple myeloma Other    • Breast cancer Maternal Aunt    • Breast cancer Maternal Aunt    • Cancer Father    • Multiple myeloma Father          Surgical History:  Past Surgical History:   Procedure Laterality Date   • APPENDECTOMY     • BREAST BIOPSY Right 2014    benign   • BREAST BIOPSY Right 2001    benign   • COLONOSCOPY  2016   • COLONOSCOPY N/A 6/22/2016    Procedure: COLONOSCOPY;   Surgeon: Junior Carver III, MD;  Location: [facility] OR;  Service:    • COLONOSCOPY N/A 2/15/2018    Procedure: COLONOSCOPY  CPTCODE:22525;  Surgeon: Junior Carver III, MD;  [facility] OR;  Service:    • ENDOSCOPY N/A 6/22/2016    Procedure: ESOPHAGOGASTRODUODENOSCOPY W/ BIOPSY;  Surgeon: Junior Carver III, MD;  Location:  [facility] OR;  Service:    • ENDOSCOPY N/A 2/15/2018    Procedure: ESOPHAGOGASTRODUODENOSCOPY WITH BIOPSY  CPTCODE:28883;  Surgeon: Junior Carver III, MD;  Location: [facility] OR;  Service:    • HYSTERECTOMY     • NOSE SURGERY     • TUBAL ABDOMINAL LIGATION     • UPPER GASTROINTESTINAL ENDOSCOPY           Examination:   Examination right elbow reveals longitudinal laceration 2.5 cm in length.  No active drainage moderate surrounding erythema improved from her visit 4 days ago.        Assessment & Plan:   61 y.o. female presents to follow-up laceration to right elbow and she fell.  Her elbow has now improved on Bactrim she will complete her 10-day course and follow-up in 1 week.         Diagnosis Plan   1. Cellulitis of right elbow         Cc:  Jackie Lr, APRN              This document has been electronically signed by Anoop Valencia MD   November 28, 2021 21:06 EST

## 2021-11-29 ENCOUNTER — OFFICE VISIT (OUTPATIENT)
Dept: ORTHOPEDIC SURGERY | Facility: CLINIC | Age: 61
End: 2021-11-29

## 2021-11-29 VITALS — HEIGHT: 67 IN | BODY MASS INDEX: 30.35 KG/M2 | WEIGHT: 193.34 LBS

## 2021-11-29 DIAGNOSIS — L03.113 CELLULITIS OF RIGHT ELBOW: ICD-10-CM

## 2021-11-29 DIAGNOSIS — S50.01XD CONTUSION OF RIGHT ELBOW, SUBSEQUENT ENCOUNTER: Primary | ICD-10-CM

## 2021-11-29 PROCEDURE — 99213 OFFICE O/P EST LOW 20 MIN: CPT | Performed by: ORTHOPAEDIC SURGERY

## 2021-11-29 RX ORDER — SULFAMETHOXAZOLE AND TRIMETHOPRIM 800; 160 MG/1; MG/1
1 TABLET ORAL 2 TIMES DAILY
Qty: 20 TABLET | Refills: 0 | Status: SHIPPED | OUTPATIENT
Start: 2021-11-29 | End: 2021-11-29

## 2021-11-30 ENCOUNTER — LAB (OUTPATIENT)
Dept: LAB | Facility: HOSPITAL | Age: 61
End: 2021-11-30

## 2021-11-30 ENCOUNTER — TRANSCRIBE ORDERS (OUTPATIENT)
Dept: ADMINISTRATIVE | Facility: HOSPITAL | Age: 61
End: 2021-11-30

## 2021-11-30 DIAGNOSIS — N18.32 CHRONIC KIDNEY DISEASE (CKD) STAGE G3B/A1, MODERATELY DECREASED GLOMERULAR FILTRATION RATE (GFR) BETWEEN 30-44 ML/MIN/1.73 SQUARE METER AND ALBUMINURIA CREATININE RATIO LESS THAN 30 MG/G (CMS/H* (HCC): Primary | ICD-10-CM

## 2021-11-30 DIAGNOSIS — N18.32 CHRONIC KIDNEY DISEASE (CKD) STAGE G3B/A1, MODERATELY DECREASED GLOMERULAR FILTRATION RATE (GFR) BETWEEN 30-44 ML/MIN/1.73 SQUARE METER AND ALBUMINURIA CREATININE RATIO LESS THAN 30 MG/G (CMS/H* (HCC): ICD-10-CM

## 2021-11-30 PROCEDURE — 36415 COLL VENOUS BLD VENIPUNCTURE: CPT

## 2021-11-30 PROCEDURE — 80048 BASIC METABOLIC PNL TOTAL CA: CPT

## 2021-12-01 LAB
ANION GAP SERPL CALCULATED.3IONS-SCNC: 10.1 MMOL/L (ref 5–15)
BUN SERPL-MCNC: 16 MG/DL (ref 8–23)
BUN/CREAT SERPL: 10.4 (ref 7–25)
CALCIUM SPEC-SCNC: 9.6 MG/DL (ref 8.6–10.5)
CHLORIDE SERPL-SCNC: 93 MMOL/L (ref 98–107)
CO2 SERPL-SCNC: 24.9 MMOL/L (ref 22–29)
CREAT SERPL-MCNC: 1.54 MG/DL (ref 0.57–1)
GFR SERPL CREATININE-BSD FRML MDRD: 34 ML/MIN/1.73
GLUCOSE SERPL-MCNC: 198 MG/DL (ref 65–99)
POTASSIUM SERPL-SCNC: 5.1 MMOL/L (ref 3.5–5.2)
SODIUM SERPL-SCNC: 128 MMOL/L (ref 136–145)

## 2021-12-09 ENCOUNTER — TRANSCRIBE ORDERS (OUTPATIENT)
Dept: ADMINISTRATIVE | Facility: HOSPITAL | Age: 61
End: 2021-12-09

## 2021-12-09 ENCOUNTER — LAB (OUTPATIENT)
Dept: LAB | Facility: HOSPITAL | Age: 61
End: 2021-12-09

## 2021-12-09 DIAGNOSIS — N18.32 STAGE 3B CHRONIC KIDNEY DISEASE (HCC): Primary | ICD-10-CM

## 2021-12-09 DIAGNOSIS — N18.32 STAGE 3B CHRONIC KIDNEY DISEASE (HCC): ICD-10-CM

## 2021-12-09 PROCEDURE — 80048 BASIC METABOLIC PNL TOTAL CA: CPT

## 2021-12-09 PROCEDURE — 36415 COLL VENOUS BLD VENIPUNCTURE: CPT

## 2021-12-10 LAB
ANION GAP SERPL CALCULATED.3IONS-SCNC: 12.9 MMOL/L (ref 5–15)
BUN SERPL-MCNC: 14 MG/DL (ref 8–23)
BUN/CREAT SERPL: 10 (ref 7–25)
CALCIUM SPEC-SCNC: 9.5 MG/DL (ref 8.6–10.5)
CHLORIDE SERPL-SCNC: 97 MMOL/L (ref 98–107)
CO2 SERPL-SCNC: 22.1 MMOL/L (ref 22–29)
CREAT SERPL-MCNC: 1.4 MG/DL (ref 0.57–1)
GFR SERPL CREATININE-BSD FRML MDRD: 38 ML/MIN/1.73
GLUCOSE SERPL-MCNC: 181 MG/DL (ref 65–99)
POTASSIUM SERPL-SCNC: 5 MMOL/L (ref 3.5–5.2)
SODIUM SERPL-SCNC: 132 MMOL/L (ref 136–145)

## 2021-12-13 ENCOUNTER — OFFICE VISIT (OUTPATIENT)
Dept: ORTHOPEDIC SURGERY | Facility: CLINIC | Age: 61
End: 2021-12-13

## 2021-12-13 VITALS — WEIGHT: 193 LBS | BODY MASS INDEX: 30.29 KG/M2 | HEIGHT: 67 IN

## 2021-12-13 DIAGNOSIS — S50.01XD CONTUSION OF RIGHT ELBOW, SUBSEQUENT ENCOUNTER: Primary | ICD-10-CM

## 2021-12-13 DIAGNOSIS — L03.113 CELLULITIS OF RIGHT ELBOW: ICD-10-CM

## 2021-12-13 PROCEDURE — 99213 OFFICE O/P EST LOW 20 MIN: CPT | Performed by: ORTHOPAEDIC SURGERY

## 2021-12-13 RX ORDER — AMITRIPTYLINE HYDROCHLORIDE 50 MG/1
TABLET, FILM COATED ORAL
COMMUNITY
Start: 2021-11-28

## 2021-12-13 RX ORDER — PREDNISOLONE ACETATE 10 MG/ML
SUSPENSION/ DROPS OPHTHALMIC
COMMUNITY
Start: 2021-12-07

## 2021-12-13 NOTE — PROGRESS NOTES
Follow-up Visit         Patient: Nasrin Lizama  YOB: 1960  Date of Encounter: 12/13/2021      Chief  Complaint:   Chief Complaint   Patient presents with   • Right Elbow - Follow-up, Pain         HPI:  Nasrin Lizama, 61 y.o. female presents in follow-up contusion to her right elbow with laceration and cellulitis.  She has completed 2 courses of Bactrim and overall doing well she still complains of some discoloration.  Pain is relatively mild.  She will be completing her second course of Bactrim today.      Medical History:  Patient Active Problem List   Diagnosis   • Constipation   • Nausea   • Irritable bowel syndrome without diarrhea   • Abdominal bloating   • Hematochezia   • External hemorrhoids   • Heartburn   • Generalized abdominal pain   • Therapeutic opioid induced constipation   • Fatty liver   • Right upper quadrant abdominal pain   • Epigastric pain   • Left buttock abscess     Past Medical History:   Diagnosis Date   • Anxiety    • Arthritis    • Back pain    • Chronic kidney disease    • COPD (chronic obstructive pulmonary disease) (HCC)    • Crohn's disease (HCC)    • Depression    • Diabetes mellitus (HCC)    • H/O colonoscopy          Social History:  Social History     Socioeconomic History   • Marital status:    Tobacco Use   • Smoking status: Current Every Day Smoker     Packs/day: 1.00     Years: 15.00     Pack years: 15.00     Types: Cigarettes   • Smokeless tobacco: Never Used   Vaping Use   • Vaping Use: Never used   Substance and Sexual Activity   • Alcohol use: No   • Drug use: No   • Sexual activity: Defer         Current Medications:    Current Outpatient Medications:   •  amitriptyline (ELAVIL) 50 MG tablet, , Disp: , Rfl:   •  budesonide-formoterol (SYMBICORT) 80-4.5 MCG/ACT inhaler, Inhale 2 puffs 2 (Two) Times a Day., Disp: , Rfl:   •  clonazePAM (KlonoPIN) 1 MG tablet, Take 1 mg by mouth 2 (two) times a day as needed for seizures., Disp: , Rfl:   •   fenofibrate (TRICOR) 145 MG tablet, Take 145 mg by mouth daily., Disp: , Rfl:   •  glimepiride (AMARYL) 4 MG tablet, Take 4 mg by mouth every morning before breakfast., Disp: , Rfl:   •  hydrOXYzine (ATARAX) 25 MG tablet, , Disp: , Rfl:   •  insulin glargine (LANTUS) 100 UNIT/ML injection, Inject  under the skin daily., Disp: , Rfl:   •  lubiprostone (AMITIZA) 8 MCG capsule, Take 1 capsule by mouth 2 (Two) Times a Day With Meals., Disp: 60 capsule, Rfl: 11  •  mesalamine (APRISO) 0.375 g 24 hr capsule, Take 4 capsules once daily, Disp: 120 capsule, Rfl: 5  •  NOVOLOG FLEXPEN 100 UNIT/ML solution pen-injector sc pen, , Disp: , Rfl:   •  oxyCODONE-acetaminophen (PERCOCET) 7.5-325 MG per tablet, Take 1 tablet by mouth every 6 (six) hours as needed., Disp: , Rfl:   •  pantoprazole (PROTONIX) 20 MG EC tablet, Take 1 tablet by mouth Daily., Disp: 30 tablet, Rfl: 5  •  PARoxetine (PAXIL) 20 MG tablet, Take 1 tablet by mouth daily. As directed., Disp: , Rfl:   •  pravastatin (PRAVACHOL) 40 MG tablet, Take 1 tablet by mouth daily., Disp: , Rfl:   •  prednisoLONE acetate (PRED FORTE) 1 % ophthalmic suspension, , Disp: , Rfl:   •  pregabalin (Lyrica) 225 MG capsule, Take 225 mg by mouth 2 (Two) Times a Day., Disp: , Rfl:   •  rosuvastatin (CRESTOR) 10 MG tablet, Take 10 mg by mouth daily., Disp: , Rfl:   •  tiotropium (SPIRIVA) 18 MCG per inhalation capsule, Place 1 capsule into inhaler and inhale Daily., Disp: , Rfl:       Allergies:  No Known Allergies      Family History:  Family History   Problem Relation Age of Onset   • Heart attack Mother         acute myocard infarction   • Cancer Other         cancer and breast cancer   • Diabetes Other    • Heart disease Other    • Multiple myeloma Other    • Breast cancer Maternal Aunt    • Breast cancer Maternal Aunt    • Cancer Father    • Multiple myeloma Father          Surgical History:  Past Surgical History:   Procedure Laterality Date   • APPENDECTOMY     • BREAST BIOPSY  Right 2014    benign   • BREAST BIOPSY Right 2001    benign   • COLONOSCOPY  2016   • COLONOSCOPY N/A 6/22/2016    Procedure: COLONOSCOPY;  Surgeon: Junior Carver III, MD;  Location:  [facility];  Service:    • COLONOSCOPY N/A 2/15/2018    Procedure: COLONOSCOPY  CPTCODE:33541;  Surgeon: Junior Carver III, MD;  Location:  [facility];  Service:    • ENDOSCOPY N/A 6/22/2016    Procedure: ESOPHAGOGASTRODUODENOSCOPY W/ BIOPSY;  Surgeon: Junior Carver III, MD;  Location:  [facility];  Service:    • ENDOSCOPY N/A 2/15/2018    Procedure: ESOPHAGOGASTRODUODENOSCOPY WITH BIOPSY  CPTCODE:49980;  Surgeon: Junior Carver III, MD;  Location:  [facility];  Service:    • HYSTERECTOMY     • NOSE SURGERY     • TUBAL ABDOMINAL LIGATION     • UPPER GASTROINTESTINAL ENDOSCOPY           Examination:   Examination elbow reveals longitudinal laceration that has now healed.  There is very small eschar.  She has discoloration surrounding her elbow about 2 cm diameter.  No erythema otherwise she demonstrates full elbow mobility and there is no fluid within the olecranon bursa.        Assessment & Plan:   61 y.o. female presents contusion to her right elbow and laceration with development of cellulitis. She has now almost resolved. I think the discoloration is related to her contusion.  I have suggested that she finish her Bactrim and we will not provide additional antibiotics.  Should she worsen she will return to the office I have asked her to follow-up in 4 weeks sooner if she develops a problem.         Diagnosis Plan   1. Contusion of right elbow, subsequent encounter     2. Cellulitis of right elbow             Cc:  Jackie Lr, APRN              This document has been electronically signed by Anoop Valencia MD   December 15, 2021 14:44 EST

## 2021-12-16 ENCOUNTER — TRANSCRIBE ORDERS (OUTPATIENT)
Dept: ADMINISTRATIVE | Facility: HOSPITAL | Age: 61
End: 2021-12-16

## 2021-12-16 ENCOUNTER — LAB (OUTPATIENT)
Dept: LAB | Facility: HOSPITAL | Age: 61
End: 2021-12-16

## 2021-12-16 DIAGNOSIS — N18.32 CHRONIC KIDNEY DISEASE, STAGE 3B (HCC): Primary | ICD-10-CM

## 2021-12-16 DIAGNOSIS — N18.32 CHRONIC KIDNEY DISEASE, STAGE 3B (HCC): ICD-10-CM

## 2021-12-16 PROCEDURE — 84156 ASSAY OF PROTEIN URINE: CPT

## 2021-12-16 PROCEDURE — 82306 VITAMIN D 25 HYDROXY: CPT

## 2021-12-16 PROCEDURE — 81050 URINALYSIS VOLUME MEASURE: CPT

## 2021-12-16 PROCEDURE — 80053 COMPREHEN METABOLIC PANEL: CPT

## 2021-12-16 PROCEDURE — 36415 COLL VENOUS BLD VENIPUNCTURE: CPT

## 2021-12-16 PROCEDURE — 83970 ASSAY OF PARATHORMONE: CPT

## 2021-12-17 LAB
25(OH)D3 SERPL-MCNC: 9.7 NG/ML (ref 30–100)
ALBUMIN SERPL-MCNC: 4.2 G/DL (ref 3.5–5.2)
ALBUMIN/GLOB SERPL: 1.2 G/DL
ALP SERPL-CCNC: 80 U/L (ref 39–117)
ALT SERPL W P-5'-P-CCNC: 28 U/L (ref 1–33)
ANION GAP SERPL CALCULATED.3IONS-SCNC: 11 MMOL/L (ref 5–15)
AST SERPL-CCNC: 28 U/L (ref 1–32)
BILIRUB SERPL-MCNC: 0.2 MG/DL (ref 0–1.2)
BUN SERPL-MCNC: 17 MG/DL (ref 8–23)
BUN/CREAT SERPL: 12.1 (ref 7–25)
CALCIUM SPEC-SCNC: 9.6 MG/DL (ref 8.6–10.5)
CHLORIDE SERPL-SCNC: 100 MMOL/L (ref 98–107)
CO2 SERPL-SCNC: 24 MMOL/L (ref 22–29)
COLLECT DURATION TIME UR: 24 HRS
CREAT SERPL-MCNC: 1.41 MG/DL (ref 0.57–1)
GFR SERPL CREATININE-BSD FRML MDRD: 38 ML/MIN/1.73
GLOBULIN UR ELPH-MCNC: 3.5 GM/DL
GLUCOSE SERPL-MCNC: 124 MG/DL (ref 65–99)
POTASSIUM SERPL-SCNC: 4.7 MMOL/L (ref 3.5–5.2)
PROT 24H UR-MRATE: 434 MG/24HOURS (ref 0–150)
PROT SERPL-MCNC: 7.7 G/DL (ref 6–8.5)
PTH-INTACT SERPL-MCNC: 77.5 PG/ML (ref 15–65)
SODIUM SERPL-SCNC: 135 MMOL/L (ref 136–145)
SPECIMEN VOL 24H UR: 1550 ML

## 2021-12-27 ENCOUNTER — TELEPHONE (OUTPATIENT)
Dept: ORTHOPEDIC SURGERY | Facility: CLINIC | Age: 61
End: 2021-12-27

## 2021-12-27 NOTE — TELEPHONE ENCOUNTER
Caller: VIOLET STEIN     Best call back number: [phone]    Type of visit: FU    Requested date: ASAP     If rescheduling, when is the original appointment: 1/10/22    Additional notes: PT WAS SPEAKING WITH SOMEBODY AT  AND WAS DISCONNECTED, UNABLE TO WT. NEED TO RESCHEDULE THE APPT FOR A SOONER DATE IF POSSIBLE.

## 2022-01-05 ENCOUNTER — TRANSCRIBE ORDERS (OUTPATIENT)
Dept: LAB | Facility: HOSPITAL | Age: 62
End: 2022-01-05

## 2022-01-05 DIAGNOSIS — Z01.818 OTHER SPECIFIED PRE-OPERATIVE EXAMINATION: Primary | ICD-10-CM

## 2022-01-10 ENCOUNTER — OFFICE VISIT (OUTPATIENT)
Dept: ORTHOPEDIC SURGERY | Facility: CLINIC | Age: 62
End: 2022-01-10

## 2022-01-10 VITALS — WEIGHT: 192.9 LBS | HEIGHT: 67 IN | BODY MASS INDEX: 30.28 KG/M2

## 2022-01-10 DIAGNOSIS — L03.113 CELLULITIS OF RIGHT ELBOW: Primary | ICD-10-CM

## 2022-01-10 DIAGNOSIS — S50.01XD CONTUSION OF RIGHT ELBOW, SUBSEQUENT ENCOUNTER: ICD-10-CM

## 2022-01-10 PROCEDURE — 99213 OFFICE O/P EST LOW 20 MIN: CPT | Performed by: ORTHOPAEDIC SURGERY

## 2022-01-10 NOTE — PROGRESS NOTES
Follow-up Visit         Patient: Nasrin Lizama  YOB: 1960  Date of Encounter: 01/10/2022      Chief  Complaint:   Chief Complaint   Patient presents with   • Right Elbow - Follow-up   • Elbow Injury     Date of injury 11/11/2021         HPI:  Nasrin Lizama, 61 y.o. female presents in follow-up contusion to her right elbow sustained in November 2021.  She received several courses of oral antibiotics and eventually improved but continues to experience occasional discomfort and swelling in that region as well as discoloration.  She reports no additional trauma she denies fever or chills.      Medical History:  Patient Active Problem List   Diagnosis   • Constipation   • Nausea   • Irritable bowel syndrome without diarrhea   • Abdominal bloating   • Hematochezia   • External hemorrhoids   • Heartburn   • Generalized abdominal pain   • Therapeutic opioid induced constipation   • Fatty liver   • Right upper quadrant abdominal pain   • Epigastric pain   • Left buttock abscess     Past Medical History:   Diagnosis Date   • Anxiety    • Arthritis    • Back pain    • Chronic kidney disease    • COPD (chronic obstructive pulmonary disease) (HCC)    • Crohn's disease (HCC)    • Depression    • Diabetes mellitus (HCC)    • H/O colonoscopy          Social History:  Social History     Socioeconomic History   • Marital status:    Tobacco Use   • Smoking status: Current Every Day Smoker     Packs/day: 1.00     Years: 15.00     Pack years: 15.00     Types: Cigarettes   • Smokeless tobacco: Never Used   Vaping Use   • Vaping Use: Never used   Substance and Sexual Activity   • Alcohol use: No   • Drug use: No   • Sexual activity: Defer         Current Medications:    Current Outpatient Medications:   •  amitriptyline (ELAVIL) 50 MG tablet, , Disp: , Rfl:   •  budesonide-formoterol (SYMBICORT) 80-4.5 MCG/ACT inhaler, Inhale 2 puffs 2 (Two) Times a Day., Disp: , Rfl:   •  clonazePAM (KlonoPIN) 1 MG  tablet, Take 1 mg by mouth 2 (two) times a day as needed for seizures., Disp: , Rfl:   •  fenofibrate (TRICOR) 145 MG tablet, Take 145 mg by mouth daily., Disp: , Rfl:   •  glimepiride (AMARYL) 4 MG tablet, Take 4 mg by mouth every morning before breakfast., Disp: , Rfl:   •  hydrOXYzine (ATARAX) 25 MG tablet, , Disp: , Rfl:   •  insulin glargine (LANTUS) 100 UNIT/ML injection, Inject  under the skin daily., Disp: , Rfl:   •  lubiprostone (AMITIZA) 8 MCG capsule, Take 1 capsule by mouth 2 (Two) Times a Day With Meals., Disp: 60 capsule, Rfl: 11  •  mesalamine (APRISO) 0.375 g 24 hr capsule, Take 4 capsules once daily, Disp: 120 capsule, Rfl: 5  •  NOVOLOG FLEXPEN 100 UNIT/ML solution pen-injector sc pen, , Disp: , Rfl:   •  oxyCODONE-acetaminophen (PERCOCET) 7.5-325 MG per tablet, Take 1 tablet by mouth every 6 (six) hours as needed., Disp: , Rfl:   •  pantoprazole (PROTONIX) 20 MG EC tablet, Take 1 tablet by mouth Daily., Disp: 30 tablet, Rfl: 5  •  PARoxetine (PAXIL) 20 MG tablet, Take 1 tablet by mouth daily. As directed., Disp: , Rfl:   •  pravastatin (PRAVACHOL) 40 MG tablet, Take 1 tablet by mouth daily., Disp: , Rfl:   •  prednisoLONE acetate (PRED FORTE) 1 % ophthalmic suspension, , Disp: , Rfl:   •  pregabalin (Lyrica) 225 MG capsule, Take 225 mg by mouth 2 (Two) Times a Day., Disp: , Rfl:   •  rosuvastatin (CRESTOR) 10 MG tablet, Take 10 mg by mouth daily., Disp: , Rfl:   •  tiotropium (SPIRIVA) 18 MCG per inhalation capsule, Place 1 capsule into inhaler and inhale Daily., Disp: , Rfl:       Allergies:  No Known Allergies      Family History:  Family History   Problem Relation Age of Onset   • Heart attack Mother         acute myocard infarction   • Cancer Other         cancer and breast cancer   • Diabetes Other    • Heart disease Other    • Multiple myeloma Other    • Breast cancer Maternal Aunt    • Breast cancer Maternal Aunt    • Cancer Father    • Multiple myeloma Father          Surgical  History:  Past Surgical History:   Procedure Laterality Date   • APPENDECTOMY     • BREAST BIOPSY Right 2014    benign   • BREAST BIOPSY Right 2001    benign   • COLONOSCOPY  2016   • COLONOSCOPY N/A 6/22/2016    Procedure: COLONOSCOPY;  Surgeon: Junior Carver III, MD;  Location: [facility] OR;  Service:    • COLONOSCOPY N/A 2/15/2018    Procedure: COLONOSCOPY  CPTCODE:23214;  Surgeon: Junior Carver III, MD;  Location:  [facility];  Service:    • ENDOSCOPY N/A 6/22/2016    Procedure: ESOPHAGOGASTRODUODENOSCOPY W/ BIOPSY;  Surgeon: Junior Carver III, MD;  Location:  [facility];  Service:    • ENDOSCOPY N/A 2/15/2018    Procedure: ESOPHAGOGASTRODUODENOSCOPY WITH BIOPSY  CPTCODE:85840;  Surgeon: Junior Carver III, MD;  Location: [facility] OR;  Service:    • HYSTERECTOMY     • NOSE SURGERY     • TUBAL ABDOMINAL LIGATION     • UPPER GASTROINTESTINAL ENDOSCOPY         Examination:   Examination right elbow reveals mild erythema posteriorly without increased warmth there is no areas of fluctuance posterior aspect of the elbow.  No drainage.  Full motion of elbow with flexion extension pronation supination.        Assessment & Plan:   61 y.o. female presents with continued pain right elbow and concerns of infection. We will request CBC sedimentation rate and CRP.  We will see her back in approximately 10 days.         Diagnosis Plan   1. Cellulitis of right elbow  CBC & Differential    Sedimentation Rate    C-reactive Protein   2. Contusion of right elbow, subsequent encounter               Cc:  Jackie Lr, APRN              This document has been electronically signed by Anoop Valencia MD   January 12, 2022 16:05 EST

## 2022-02-17 ENCOUNTER — LAB (OUTPATIENT)
Dept: LAB | Facility: HOSPITAL | Age: 62
End: 2022-02-17

## 2022-02-17 ENCOUNTER — TRANSCRIBE ORDERS (OUTPATIENT)
Dept: ADMINISTRATIVE | Facility: HOSPITAL | Age: 62
End: 2022-02-17

## 2022-02-17 DIAGNOSIS — N18.32 STAGE 3B CHRONIC KIDNEY DISEASE: Primary | ICD-10-CM

## 2022-02-17 DIAGNOSIS — L03.113 CELLULITIS OF RIGHT ELBOW: ICD-10-CM

## 2022-02-17 DIAGNOSIS — N18.32 STAGE 3B CHRONIC KIDNEY DISEASE: ICD-10-CM

## 2022-02-17 DIAGNOSIS — E11.65 INADEQUATELY CONTROLLED DIABETES MELLITUS: Primary | ICD-10-CM

## 2022-02-17 DIAGNOSIS — E11.65 INADEQUATELY CONTROLLED DIABETES MELLITUS: ICD-10-CM

## 2022-02-17 PROCEDURE — 36415 COLL VENOUS BLD VENIPUNCTURE: CPT

## 2022-02-17 PROCEDURE — 85652 RBC SED RATE AUTOMATED: CPT

## 2022-02-17 PROCEDURE — 86140 C-REACTIVE PROTEIN: CPT

## 2022-02-17 PROCEDURE — 83036 HEMOGLOBIN GLYCOSYLATED A1C: CPT

## 2022-02-17 PROCEDURE — 85025 COMPLETE CBC W/AUTO DIFF WBC: CPT

## 2022-02-17 PROCEDURE — 80053 COMPREHEN METABOLIC PANEL: CPT

## 2022-02-18 LAB
ALBUMIN SERPL-MCNC: 4.2 G/DL (ref 3.5–5.2)
ALBUMIN/GLOB SERPL: 1.2 G/DL
ALP SERPL-CCNC: 84 U/L (ref 39–117)
ALT SERPL W P-5'-P-CCNC: 43 U/L (ref 1–33)
ANION GAP SERPL CALCULATED.3IONS-SCNC: 10.1 MMOL/L (ref 5–15)
AST SERPL-CCNC: 53 U/L (ref 1–32)
BASOPHILS # BLD AUTO: 0.05 10*3/MM3 (ref 0–0.2)
BASOPHILS NFR BLD AUTO: 1.2 % (ref 0–1.5)
BILIRUB SERPL-MCNC: 0.3 MG/DL (ref 0–1.2)
BUN SERPL-MCNC: 16 MG/DL (ref 8–23)
BUN/CREAT SERPL: 10.7 (ref 7–25)
CALCIUM SPEC-SCNC: 9.8 MG/DL (ref 8.6–10.5)
CHLORIDE SERPL-SCNC: 95 MMOL/L (ref 98–107)
CO2 SERPL-SCNC: 26.9 MMOL/L (ref 22–29)
CREAT SERPL-MCNC: 1.49 MG/DL (ref 0.57–1)
CRP SERPL-MCNC: 0.68 MG/DL (ref 0–0.5)
DEPRECATED RDW RBC AUTO: 43.9 FL (ref 37–54)
EOSINOPHIL # BLD AUTO: 0.15 10*3/MM3 (ref 0–0.4)
EOSINOPHIL NFR BLD AUTO: 3.7 % (ref 0.3–6.2)
ERYTHROCYTE [DISTWIDTH] IN BLOOD BY AUTOMATED COUNT: 13.2 % (ref 12.3–15.4)
ERYTHROCYTE [SEDIMENTATION RATE] IN BLOOD: 36 MM/HR (ref 0–30)
GFR SERPL CREATININE-BSD FRML MDRD: 35 ML/MIN/1.73
GLOBULIN UR ELPH-MCNC: 3.6 GM/DL
GLUCOSE SERPL-MCNC: 178 MG/DL (ref 65–99)
HBA1C MFR BLD: 7.8 % (ref 4.8–5.6)
HCT VFR BLD AUTO: 35.1 % (ref 34–46.6)
HGB BLD-MCNC: 11.2 G/DL (ref 12–15.9)
IMM GRANULOCYTES # BLD AUTO: 0.01 10*3/MM3 (ref 0–0.05)
IMM GRANULOCYTES NFR BLD AUTO: 0.2 % (ref 0–0.5)
LYMPHOCYTES # BLD AUTO: 0.97 10*3/MM3 (ref 0.7–3.1)
LYMPHOCYTES NFR BLD AUTO: 24.1 % (ref 19.6–45.3)
MCH RBC QN AUTO: 29 PG (ref 26.6–33)
MCHC RBC AUTO-ENTMCNC: 31.9 G/DL (ref 31.5–35.7)
MCV RBC AUTO: 90.9 FL (ref 79–97)
MONOCYTES # BLD AUTO: 0.48 10*3/MM3 (ref 0.1–0.9)
MONOCYTES NFR BLD AUTO: 11.9 % (ref 5–12)
NEUTROPHILS NFR BLD AUTO: 2.36 10*3/MM3 (ref 1.7–7)
NEUTROPHILS NFR BLD AUTO: 58.9 % (ref 42.7–76)
NRBC BLD AUTO-RTO: 0 /100 WBC (ref 0–0.2)
PLATELET # BLD AUTO: 173 10*3/MM3 (ref 140–450)
PMV BLD AUTO: 13 FL (ref 6–12)
POTASSIUM SERPL-SCNC: 4.7 MMOL/L (ref 3.5–5.2)
PROT SERPL-MCNC: 7.8 G/DL (ref 6–8.5)
RBC # BLD AUTO: 3.86 10*6/MM3 (ref 3.77–5.28)
SODIUM SERPL-SCNC: 132 MMOL/L (ref 136–145)
WBC NRBC COR # BLD: 4.02 10*3/MM3 (ref 3.4–10.8)

## 2022-04-07 ENCOUNTER — TRANSCRIBE ORDERS (OUTPATIENT)
Dept: ADMINISTRATIVE | Facility: HOSPITAL | Age: 62
End: 2022-04-07

## 2022-04-07 ENCOUNTER — LAB (OUTPATIENT)
Dept: LAB | Facility: HOSPITAL | Age: 62
End: 2022-04-07

## 2022-04-07 DIAGNOSIS — N18.32 CHRONIC KIDNEY DISEASE, STAGE 3B: ICD-10-CM

## 2022-04-07 DIAGNOSIS — N18.32 CHRONIC KIDNEY DISEASE, STAGE 3B: Primary | ICD-10-CM

## 2022-04-07 PROCEDURE — 81001 URINALYSIS AUTO W/SCOPE: CPT

## 2022-04-07 PROCEDURE — 82570 ASSAY OF URINE CREATININE: CPT

## 2022-04-07 PROCEDURE — 84156 ASSAY OF PROTEIN URINE: CPT

## 2022-04-07 PROCEDURE — 80053 COMPREHEN METABOLIC PANEL: CPT

## 2022-04-07 PROCEDURE — 36415 COLL VENOUS BLD VENIPUNCTURE: CPT

## 2022-04-08 LAB
ALBUMIN SERPL-MCNC: 4.2 G/DL (ref 3.5–5.2)
ALBUMIN/GLOB SERPL: 1.3 G/DL
ALP SERPL-CCNC: 79 U/L (ref 39–117)
ALT SERPL W P-5'-P-CCNC: 33 U/L (ref 1–33)
ANION GAP SERPL CALCULATED.3IONS-SCNC: 11.6 MMOL/L (ref 5–15)
AST SERPL-CCNC: 40 U/L (ref 1–32)
BACTERIA UR QL AUTO: ABNORMAL /HPF
BILIRUB SERPL-MCNC: 0.3 MG/DL (ref 0–1.2)
BILIRUB UR QL STRIP: NEGATIVE
BUN SERPL-MCNC: 12 MG/DL (ref 8–23)
BUN/CREAT SERPL: 8.3 (ref 7–25)
CALCIUM SPEC-SCNC: 9.4 MG/DL (ref 8.6–10.5)
CHLORIDE SERPL-SCNC: 101 MMOL/L (ref 98–107)
CLARITY UR: CLEAR
CO2 SERPL-SCNC: 24.4 MMOL/L (ref 22–29)
COLOR UR: YELLOW
CREAT SERPL-MCNC: 1.45 MG/DL (ref 0.57–1)
CREAT UR-MCNC: 45.3 MG/DL
EGFRCR SERPLBLD CKD-EPI 2021: 40.6 ML/MIN/1.73
GLOBULIN UR ELPH-MCNC: 3.2 GM/DL
GLUCOSE SERPL-MCNC: 185 MG/DL (ref 65–99)
GLUCOSE UR STRIP-MCNC: NEGATIVE MG/DL
HGB UR QL STRIP.AUTO: ABNORMAL
HYALINE CASTS UR QL AUTO: ABNORMAL /LPF
KETONES UR QL STRIP: NEGATIVE
LEUKOCYTE ESTERASE UR QL STRIP.AUTO: ABNORMAL
NITRITE UR QL STRIP: NEGATIVE
PH UR STRIP.AUTO: 6.5 [PH] (ref 5–8)
POTASSIUM SERPL-SCNC: 4.6 MMOL/L (ref 3.5–5.2)
PROT ?TM UR-MCNC: 41.9 MG/DL
PROT SERPL-MCNC: 7.4 G/DL (ref 6–8.5)
PROT UR QL STRIP: ABNORMAL
PROT/CREAT UR: 924.9 MG/G CREA (ref 0–200)
RBC # UR STRIP: ABNORMAL /HPF
REF LAB TEST METHOD: ABNORMAL
SODIUM SERPL-SCNC: 137 MMOL/L (ref 136–145)
SP GR UR STRIP: 1.01 (ref 1–1.03)
SQUAMOUS #/AREA URNS HPF: ABNORMAL /HPF
UROBILINOGEN UR QL STRIP: ABNORMAL
WBC # UR STRIP: ABNORMAL /HPF

## 2022-07-18 ENCOUNTER — TRANSCRIBE ORDERS (OUTPATIENT)
Dept: ADMINISTRATIVE | Facility: HOSPITAL | Age: 62
End: 2022-07-18

## 2022-07-18 ENCOUNTER — LAB (OUTPATIENT)
Dept: LAB | Facility: HOSPITAL | Age: 62
End: 2022-07-18

## 2022-07-18 DIAGNOSIS — N18.2 CHRONIC KIDNEY DISEASE, STAGE II (MILD): ICD-10-CM

## 2022-07-18 DIAGNOSIS — N18.2 CHRONIC KIDNEY DISEASE, STAGE II (MILD): Primary | ICD-10-CM

## 2022-07-18 PROCEDURE — 81001 URINALYSIS AUTO W/SCOPE: CPT

## 2022-07-18 PROCEDURE — 82570 ASSAY OF URINE CREATININE: CPT

## 2022-07-18 PROCEDURE — 36415 COLL VENOUS BLD VENIPUNCTURE: CPT

## 2022-07-18 PROCEDURE — 84156 ASSAY OF PROTEIN URINE: CPT

## 2022-07-18 PROCEDURE — 80053 COMPREHEN METABOLIC PANEL: CPT

## 2022-07-19 LAB
ALBUMIN SERPL-MCNC: 4.4 G/DL (ref 3.5–5.2)
ALBUMIN/GLOB SERPL: 1.5 G/DL
ALP SERPL-CCNC: 64 U/L (ref 39–117)
ALT SERPL W P-5'-P-CCNC: 51 U/L (ref 1–33)
ANION GAP SERPL CALCULATED.3IONS-SCNC: 9 MMOL/L (ref 5–15)
AST SERPL-CCNC: 52 U/L (ref 1–32)
BACTERIA UR QL AUTO: NORMAL /HPF
BILIRUB SERPL-MCNC: 0.3 MG/DL (ref 0–1.2)
BILIRUB UR QL STRIP: NEGATIVE
BUN SERPL-MCNC: 13 MG/DL (ref 8–23)
BUN/CREAT SERPL: 9.1 (ref 7–25)
CALCIUM SPEC-SCNC: 10.2 MG/DL (ref 8.6–10.5)
CHLORIDE SERPL-SCNC: 98 MMOL/L (ref 98–107)
CLARITY UR: CLEAR
CO2 SERPL-SCNC: 29 MMOL/L (ref 22–29)
COLOR UR: YELLOW
CREAT SERPL-MCNC: 1.43 MG/DL (ref 0.57–1)
CREAT UR-MCNC: 43.4 MG/DL
EGFRCR SERPLBLD CKD-EPI 2021: 41 ML/MIN/1.73
GLOBULIN UR ELPH-MCNC: 3 GM/DL
GLUCOSE SERPL-MCNC: 150 MG/DL (ref 65–99)
GLUCOSE UR STRIP-MCNC: NEGATIVE MG/DL
HGB UR QL STRIP.AUTO: ABNORMAL
HYALINE CASTS UR QL AUTO: NORMAL /LPF
KETONES UR QL STRIP: NEGATIVE
LEUKOCYTE ESTERASE UR QL STRIP.AUTO: ABNORMAL
NITRITE UR QL STRIP: NEGATIVE
PH UR STRIP.AUTO: 6 [PH] (ref 5–8)
POTASSIUM SERPL-SCNC: 5.1 MMOL/L (ref 3.5–5.2)
PROT ?TM UR-MCNC: 64.6 MG/DL
PROT SERPL-MCNC: 7.4 G/DL (ref 6–8.5)
PROT UR QL STRIP: ABNORMAL
PROT/CREAT UR: 1488.5 MG/G CREA (ref 0–200)
RBC # UR STRIP: NORMAL /HPF
REF LAB TEST METHOD: NORMAL
SODIUM SERPL-SCNC: 136 MMOL/L (ref 136–145)
SP GR UR STRIP: 1.01 (ref 1–1.03)
SQUAMOUS #/AREA URNS HPF: NORMAL /HPF
UROBILINOGEN UR QL STRIP: ABNORMAL
WBC # UR STRIP: NORMAL /HPF

## 2022-10-06 ENCOUNTER — TRANSCRIBE ORDERS (OUTPATIENT)
Dept: ADMINISTRATIVE | Facility: HOSPITAL | Age: 62
End: 2022-10-06

## 2022-10-06 ENCOUNTER — LAB (OUTPATIENT)
Dept: LAB | Facility: HOSPITAL | Age: 62
End: 2022-10-06

## 2022-10-06 DIAGNOSIS — E11.9 DIABETES MELLITUS WITHOUT COMPLICATION: ICD-10-CM

## 2022-10-06 DIAGNOSIS — E55.9 VITAMIN D DEFICIENCY: ICD-10-CM

## 2022-10-06 DIAGNOSIS — E83.52 HYPERCALCEMIA: ICD-10-CM

## 2022-10-06 DIAGNOSIS — N18.32 CHRONIC KIDNEY DISEASE, STAGE 3B: ICD-10-CM

## 2022-10-06 DIAGNOSIS — N18.32 CHRONIC KIDNEY DISEASE, STAGE 3B: Primary | ICD-10-CM

## 2022-10-06 DIAGNOSIS — E11.9 DIABETES MELLITUS WITHOUT COMPLICATION: Primary | ICD-10-CM

## 2022-10-06 PROCEDURE — 81001 URINALYSIS AUTO W/SCOPE: CPT

## 2022-10-06 PROCEDURE — 82607 VITAMIN B-12: CPT

## 2022-10-06 PROCEDURE — 84100 ASSAY OF PHOSPHORUS: CPT

## 2022-10-06 PROCEDURE — 83970 ASSAY OF PARATHORMONE: CPT

## 2022-10-06 PROCEDURE — 80053 COMPREHEN METABOLIC PANEL: CPT

## 2022-10-06 PROCEDURE — 84443 ASSAY THYROID STIM HORMONE: CPT

## 2022-10-06 PROCEDURE — 80061 LIPID PANEL: CPT

## 2022-10-06 PROCEDURE — 82306 VITAMIN D 25 HYDROXY: CPT

## 2022-10-06 PROCEDURE — 36415 COLL VENOUS BLD VENIPUNCTURE: CPT

## 2022-10-06 PROCEDURE — 84439 ASSAY OF FREE THYROXINE: CPT

## 2022-10-06 PROCEDURE — 84156 ASSAY OF PROTEIN URINE: CPT

## 2022-10-06 PROCEDURE — 82570 ASSAY OF URINE CREATININE: CPT

## 2022-10-06 PROCEDURE — 85025 COMPLETE CBC W/AUTO DIFF WBC: CPT

## 2022-10-07 ENCOUNTER — LAB (OUTPATIENT)
Dept: LAB | Facility: HOSPITAL | Age: 62
End: 2022-10-07

## 2022-10-07 DIAGNOSIS — E83.52 HYPERCALCEMIA: ICD-10-CM

## 2022-10-07 DIAGNOSIS — N18.32 CHRONIC KIDNEY DISEASE, STAGE 3B: ICD-10-CM

## 2022-10-07 LAB
25(OH)D3 SERPL-MCNC: 8.6 NG/ML (ref 30–100)
ALBUMIN SERPL-MCNC: 4.5 G/DL (ref 3.5–5.2)
ALBUMIN/GLOB SERPL: 2 G/DL
ALP SERPL-CCNC: 64 U/L (ref 39–117)
ALT SERPL W P-5'-P-CCNC: 33 U/L (ref 1–33)
ANION GAP SERPL CALCULATED.3IONS-SCNC: 7.9 MMOL/L (ref 5–15)
AST SERPL-CCNC: 41 U/L (ref 1–32)
BACTERIA UR QL AUTO: ABNORMAL /HPF
BASOPHILS # BLD AUTO: 0.04 10*3/MM3 (ref 0–0.2)
BASOPHILS NFR BLD AUTO: 1.3 % (ref 0–1.5)
BILIRUB SERPL-MCNC: 0.3 MG/DL (ref 0–1.2)
BILIRUB UR QL STRIP: NEGATIVE
BUN SERPL-MCNC: 19 MG/DL (ref 8–23)
BUN/CREAT SERPL: 12.6 (ref 7–25)
CALCIUM SPEC-SCNC: 9.5 MG/DL (ref 8.6–10.5)
CHLORIDE SERPL-SCNC: 101 MMOL/L (ref 98–107)
CHOLEST SERPL-MCNC: 126 MG/DL (ref 0–200)
CLARITY UR: CLEAR
CO2 SERPL-SCNC: 26.1 MMOL/L (ref 22–29)
COLOR UR: YELLOW
CREAT SERPL-MCNC: 1.51 MG/DL (ref 0.57–1)
CREAT UR-MCNC: 36.8 MG/DL
DEPRECATED RDW RBC AUTO: 48 FL (ref 37–54)
EGFRCR SERPLBLD CKD-EPI 2021: 38.4 ML/MIN/1.73
EOSINOPHIL # BLD AUTO: 0.14 10*3/MM3 (ref 0–0.4)
EOSINOPHIL NFR BLD AUTO: 4.6 % (ref 0.3–6.2)
ERYTHROCYTE [DISTWIDTH] IN BLOOD BY AUTOMATED COUNT: 14 % (ref 12.3–15.4)
GLOBULIN UR ELPH-MCNC: 2.3 GM/DL
GLUCOSE SERPL-MCNC: 172 MG/DL (ref 65–99)
GLUCOSE UR STRIP-MCNC: NEGATIVE MG/DL
HCT VFR BLD AUTO: 33.2 % (ref 34–46.6)
HDLC SERPL-MCNC: 21 MG/DL (ref 40–60)
HGB BLD-MCNC: 10.9 G/DL (ref 12–15.9)
HGB UR QL STRIP.AUTO: ABNORMAL
HYALINE CASTS UR QL AUTO: ABNORMAL /LPF
IMM GRANULOCYTES # BLD AUTO: 0 10*3/MM3 (ref 0–0.05)
IMM GRANULOCYTES NFR BLD AUTO: 0 % (ref 0–0.5)
KETONES UR QL STRIP: NEGATIVE
LDLC SERPL CALC-MCNC: 62 MG/DL (ref 0–100)
LDLC/HDLC SERPL: 2.44 {RATIO}
LEUKOCYTE ESTERASE UR QL STRIP.AUTO: ABNORMAL
LYMPHOCYTES # BLD AUTO: 0.91 10*3/MM3 (ref 0.7–3.1)
LYMPHOCYTES NFR BLD AUTO: 29.8 % (ref 19.6–45.3)
MCH RBC QN AUTO: 30.9 PG (ref 26.6–33)
MCHC RBC AUTO-ENTMCNC: 32.8 G/DL (ref 31.5–35.7)
MCV RBC AUTO: 94.1 FL (ref 79–97)
MONOCYTES # BLD AUTO: 0.34 10*3/MM3 (ref 0.1–0.9)
MONOCYTES NFR BLD AUTO: 11.1 % (ref 5–12)
NEUTROPHILS NFR BLD AUTO: 1.62 10*3/MM3 (ref 1.7–7)
NEUTROPHILS NFR BLD AUTO: 53.2 % (ref 42.7–76)
NITRITE UR QL STRIP: NEGATIVE
NRBC BLD AUTO-RTO: 0 /100 WBC (ref 0–0.2)
PH UR STRIP.AUTO: 6 [PH] (ref 5–8)
PHOSPHATE SERPL-MCNC: 3.1 MG/DL (ref 2.5–4.5)
PLATELET # BLD AUTO: 170 10*3/MM3 (ref 140–450)
PMV BLD AUTO: 12.2 FL (ref 6–12)
POTASSIUM SERPL-SCNC: 4.8 MMOL/L (ref 3.5–5.2)
PROT ?TM UR-MCNC: 45.5 MG/DL
PROT SERPL-MCNC: 6.8 G/DL (ref 6–8.5)
PROT UR QL STRIP: ABNORMAL
PROT/CREAT UR: 1236.4 MG/G CREA (ref 0–200)
PTH-INTACT SERPL-MCNC: 87.1 PG/ML (ref 15–65)
RBC # BLD AUTO: 3.53 10*6/MM3 (ref 3.77–5.28)
RBC # UR STRIP: ABNORMAL /HPF
REF LAB TEST METHOD: ABNORMAL
SODIUM SERPL-SCNC: 135 MMOL/L (ref 136–145)
SP GR UR STRIP: 1.01 (ref 1–1.03)
SQUAMOUS #/AREA URNS HPF: ABNORMAL /HPF
T4 FREE SERPL-MCNC: 0.71 NG/DL (ref 0.93–1.7)
TRIGL SERPL-MCNC: 269 MG/DL (ref 0–150)
TSH SERPL DL<=0.05 MIU/L-ACNC: 5.87 UIU/ML (ref 0.27–4.2)
UROBILINOGEN UR QL STRIP: ABNORMAL
VIT B12 BLD-MCNC: 236 PG/ML (ref 211–946)
VLDLC SERPL-MCNC: 43 MG/DL (ref 5–40)
WBC # UR STRIP: ABNORMAL /HPF
WBC NRBC COR # BLD: 3.05 10*3/MM3 (ref 3.4–10.8)

## 2022-10-07 PROCEDURE — 84156 ASSAY OF PROTEIN URINE: CPT

## 2022-10-07 PROCEDURE — 81050 URINALYSIS VOLUME MEASURE: CPT

## 2022-10-08 LAB
COLLECT DURATION TIME UR: 24 HRS
PROT 24H UR-MRATE: 895.5 MG/24HOURS (ref 0–150)
SPECIMEN VOL 24H UR: 1500 ML

## 2023-03-09 ENCOUNTER — TRANSCRIBE ORDERS (OUTPATIENT)
Dept: ADMINISTRATIVE | Facility: HOSPITAL | Age: 63
End: 2023-03-09
Payer: MEDICARE

## 2023-03-09 ENCOUNTER — LAB (OUTPATIENT)
Dept: LAB | Facility: HOSPITAL | Age: 63
End: 2023-03-09
Payer: MEDICARE

## 2023-03-09 DIAGNOSIS — N18.32 CHRONIC KIDNEY DISEASE, STAGE 3B: Primary | ICD-10-CM

## 2023-03-09 DIAGNOSIS — E83.52 HYPERCALCEMIA: ICD-10-CM

## 2023-03-09 DIAGNOSIS — N18.32 CHRONIC KIDNEY DISEASE, STAGE 3B: ICD-10-CM

## 2023-03-09 PROCEDURE — 81001 URINALYSIS AUTO W/SCOPE: CPT

## 2023-03-09 PROCEDURE — 83970 ASSAY OF PARATHORMONE: CPT

## 2023-03-09 PROCEDURE — 84100 ASSAY OF PHOSPHORUS: CPT

## 2023-03-09 PROCEDURE — 36415 COLL VENOUS BLD VENIPUNCTURE: CPT

## 2023-03-09 PROCEDURE — 82306 VITAMIN D 25 HYDROXY: CPT

## 2023-03-09 PROCEDURE — 82570 ASSAY OF URINE CREATININE: CPT

## 2023-03-09 PROCEDURE — 84156 ASSAY OF PROTEIN URINE: CPT

## 2023-03-09 PROCEDURE — 80053 COMPREHEN METABOLIC PANEL: CPT

## 2023-03-10 LAB
25(OH)D3 SERPL-MCNC: 13.4 NG/ML (ref 30–100)
ALBUMIN SERPL-MCNC: 4.3 G/DL (ref 3.5–5.2)
ALBUMIN/GLOB SERPL: 1.3 G/DL
ALP SERPL-CCNC: 73 U/L (ref 39–117)
ALT SERPL W P-5'-P-CCNC: 45 U/L (ref 1–33)
ANION GAP SERPL CALCULATED.3IONS-SCNC: 9 MMOL/L (ref 5–15)
AST SERPL-CCNC: 38 U/L (ref 1–32)
BACTERIA UR QL AUTO: ABNORMAL /HPF
BILIRUB SERPL-MCNC: 0.3 MG/DL (ref 0–1.2)
BILIRUB UR QL STRIP: NEGATIVE
BUN SERPL-MCNC: 15 MG/DL (ref 8–23)
BUN/CREAT SERPL: 10.1 (ref 7–25)
CALCIUM SPEC-SCNC: 9.5 MG/DL (ref 8.6–10.5)
CHLORIDE SERPL-SCNC: 100 MMOL/L (ref 98–107)
CLARITY UR: CLEAR
CO2 SERPL-SCNC: 27 MMOL/L (ref 22–29)
COLOR UR: YELLOW
CREAT SERPL-MCNC: 1.49 MG/DL (ref 0.57–1)
CREAT UR-MCNC: 46.7 MG/DL
EGFRCR SERPLBLD CKD-EPI 2021: 39.1 ML/MIN/1.73
GLOBULIN UR ELPH-MCNC: 3.3 GM/DL
GLUCOSE SERPL-MCNC: 117 MG/DL (ref 65–99)
GLUCOSE UR STRIP-MCNC: NEGATIVE MG/DL
HGB UR QL STRIP.AUTO: ABNORMAL
HYALINE CASTS UR QL AUTO: ABNORMAL /LPF
KETONES UR QL STRIP: NEGATIVE
LEUKOCYTE ESTERASE UR QL STRIP.AUTO: NEGATIVE
NITRITE UR QL STRIP: NEGATIVE
PH UR STRIP.AUTO: 6.5 [PH] (ref 5–8)
PHOSPHATE SERPL-MCNC: 3 MG/DL (ref 2.5–4.5)
POTASSIUM SERPL-SCNC: 5.1 MMOL/L (ref 3.5–5.2)
PROT ?TM UR-MCNC: 76.8 MG/DL
PROT SERPL-MCNC: 7.6 G/DL (ref 6–8.5)
PROT UR QL STRIP: ABNORMAL
PROT/CREAT UR: 1644.5 MG/G CREA (ref 0–200)
PTH-INTACT SERPL-MCNC: 95.4 PG/ML (ref 15–65)
RBC # UR STRIP: ABNORMAL /HPF
REF LAB TEST METHOD: ABNORMAL
SODIUM SERPL-SCNC: 136 MMOL/L (ref 136–145)
SP GR UR STRIP: 1.01 (ref 1–1.03)
SQUAMOUS #/AREA URNS HPF: ABNORMAL /HPF
UROBILINOGEN UR QL STRIP: ABNORMAL
WBC # UR STRIP: ABNORMAL /HPF

## 2023-05-04 ENCOUNTER — TRANSCRIBE ORDERS (OUTPATIENT)
Dept: ADMINISTRATIVE | Facility: HOSPITAL | Age: 63
End: 2023-05-04
Payer: MEDICARE

## 2023-05-04 ENCOUNTER — LAB (OUTPATIENT)
Dept: LAB | Facility: HOSPITAL | Age: 63
End: 2023-05-04
Payer: MEDICARE

## 2023-05-04 DIAGNOSIS — N18.32 CHRONIC KIDNEY DISEASE, STAGE 3B: ICD-10-CM

## 2023-05-04 DIAGNOSIS — N18.32 CHRONIC KIDNEY DISEASE, STAGE 3B: Primary | ICD-10-CM

## 2023-05-04 PROCEDURE — 80053 COMPREHEN METABOLIC PANEL: CPT

## 2023-05-04 PROCEDURE — 81050 URINALYSIS VOLUME MEASURE: CPT

## 2023-05-04 PROCEDURE — 36415 COLL VENOUS BLD VENIPUNCTURE: CPT

## 2023-05-04 PROCEDURE — 84156 ASSAY OF PROTEIN URINE: CPT

## 2023-05-05 LAB
ALBUMIN SERPL-MCNC: 4.2 G/DL (ref 3.5–5.2)
ALBUMIN/GLOB SERPL: 1.4 G/DL
ALP SERPL-CCNC: 64 U/L (ref 39–117)
ALT SERPL W P-5'-P-CCNC: 39 U/L (ref 1–33)
ANION GAP SERPL CALCULATED.3IONS-SCNC: 9.9 MMOL/L (ref 5–15)
AST SERPL-CCNC: 39 U/L (ref 1–32)
BILIRUB SERPL-MCNC: 0.3 MG/DL (ref 0–1.2)
BUN SERPL-MCNC: 17 MG/DL (ref 8–23)
BUN/CREAT SERPL: 8.8 (ref 7–25)
CALCIUM SPEC-SCNC: 9.8 MG/DL (ref 8.6–10.5)
CHLORIDE SERPL-SCNC: 102 MMOL/L (ref 98–107)
CO2 SERPL-SCNC: 26.1 MMOL/L (ref 22–29)
COLLECT DURATION TIME UR: 24 HRS
CREAT SERPL-MCNC: 1.94 MG/DL (ref 0.57–1)
EGFRCR SERPLBLD CKD-EPI 2021: 28.5 ML/MIN/1.73
GLOBULIN UR ELPH-MCNC: 3 GM/DL
GLUCOSE SERPL-MCNC: 143 MG/DL (ref 65–99)
POTASSIUM SERPL-SCNC: 4.9 MMOL/L (ref 3.5–5.2)
PROT 24H UR-MRATE: 546 MG/24HOURS (ref 0–150)
PROT SERPL-MCNC: 7.2 G/DL (ref 6–8.5)
SODIUM SERPL-SCNC: 138 MMOL/L (ref 136–145)
SPECIMEN VOL 24H UR: 1500 ML

## 2023-05-10 ENCOUNTER — LAB (OUTPATIENT)
Dept: LAB | Facility: HOSPITAL | Age: 63
End: 2023-05-10
Payer: MEDICARE

## 2023-05-10 ENCOUNTER — TRANSCRIBE ORDERS (OUTPATIENT)
Dept: ADMINISTRATIVE | Facility: HOSPITAL | Age: 63
End: 2023-05-10
Payer: MEDICARE

## 2023-05-10 DIAGNOSIS — N18.32 CHRONIC KIDNEY DISEASE, STAGE 3B: Primary | ICD-10-CM

## 2023-05-10 DIAGNOSIS — N18.32 CHRONIC KIDNEY DISEASE, STAGE 3B: ICD-10-CM

## 2023-05-10 PROCEDURE — 80048 BASIC METABOLIC PNL TOTAL CA: CPT

## 2023-05-10 PROCEDURE — 36415 COLL VENOUS BLD VENIPUNCTURE: CPT

## 2023-05-10 PROCEDURE — 81001 URINALYSIS AUTO W/SCOPE: CPT

## 2023-05-11 LAB
ANION GAP SERPL CALCULATED.3IONS-SCNC: 13.2 MMOL/L (ref 5–15)
BACTERIA UR QL AUTO: ABNORMAL /HPF
BILIRUB UR QL STRIP: NEGATIVE
BUN SERPL-MCNC: 24 MG/DL (ref 8–23)
BUN/CREAT SERPL: 13.6 (ref 7–25)
CALCIUM SPEC-SCNC: 10.3 MG/DL (ref 8.6–10.5)
CHLORIDE SERPL-SCNC: 102 MMOL/L (ref 98–107)
CLARITY UR: CLEAR
CO2 SERPL-SCNC: 23.8 MMOL/L (ref 22–29)
COLOR UR: YELLOW
CREAT SERPL-MCNC: 1.77 MG/DL (ref 0.57–1)
EGFRCR SERPLBLD CKD-EPI 2021: 31.8 ML/MIN/1.73
GLUCOSE SERPL-MCNC: 119 MG/DL (ref 65–99)
GLUCOSE UR STRIP-MCNC: ABNORMAL MG/DL
HGB UR QL STRIP.AUTO: ABNORMAL
HYALINE CASTS UR QL AUTO: ABNORMAL /LPF
KETONES UR QL STRIP: NEGATIVE
LEUKOCYTE ESTERASE UR QL STRIP.AUTO: NEGATIVE
NITRITE UR QL STRIP: NEGATIVE
PH UR STRIP.AUTO: 7 [PH] (ref 5–8)
POTASSIUM SERPL-SCNC: 5.2 MMOL/L (ref 3.5–5.2)
PROT UR QL STRIP: ABNORMAL
RBC # UR STRIP: ABNORMAL /HPF
REF LAB TEST METHOD: ABNORMAL
SODIUM SERPL-SCNC: 139 MMOL/L (ref 136–145)
SP GR UR STRIP: 1.02 (ref 1–1.03)
SQUAMOUS #/AREA URNS HPF: ABNORMAL /HPF
UROBILINOGEN UR QL STRIP: ABNORMAL
WBC # UR STRIP: ABNORMAL /HPF

## 2023-08-09 ENCOUNTER — LAB (OUTPATIENT)
Dept: LAB | Facility: HOSPITAL | Age: 63
End: 2023-08-09
Payer: MEDICARE

## 2023-08-09 ENCOUNTER — TRANSCRIBE ORDERS (OUTPATIENT)
Dept: ADMINISTRATIVE | Facility: HOSPITAL | Age: 63
End: 2023-08-09
Payer: MEDICARE

## 2023-08-09 DIAGNOSIS — I10 ESSENTIAL HYPERTENSION, MALIGNANT: ICD-10-CM

## 2023-08-09 DIAGNOSIS — Z79.4 ENCOUNTER FOR LONG-TERM (CURRENT) USE OF INSULIN: ICD-10-CM

## 2023-08-09 DIAGNOSIS — E11.9 DIABETES MELLITUS WITHOUT COMPLICATION: ICD-10-CM

## 2023-08-09 DIAGNOSIS — E78.2 MIXED HYPERLIPIDEMIA: ICD-10-CM

## 2023-08-09 DIAGNOSIS — E11.9 DIABETES MELLITUS WITHOUT COMPLICATION: Primary | ICD-10-CM

## 2023-08-09 PROCEDURE — 82607 VITAMIN B-12: CPT

## 2023-08-09 PROCEDURE — 36415 COLL VENOUS BLD VENIPUNCTURE: CPT

## 2023-08-09 PROCEDURE — 80053 COMPREHEN METABOLIC PANEL: CPT

## 2023-08-09 PROCEDURE — 80061 LIPID PANEL: CPT

## 2023-08-10 LAB
ALBUMIN SERPL-MCNC: 4.4 G/DL (ref 3.5–5.2)
ALBUMIN/GLOB SERPL: 1.6 G/DL
ALP SERPL-CCNC: 51 U/L (ref 39–117)
ALT SERPL W P-5'-P-CCNC: 25 U/L (ref 1–33)
ANION GAP SERPL CALCULATED.3IONS-SCNC: 11.1 MMOL/L (ref 5–15)
AST SERPL-CCNC: 29 U/L (ref 1–32)
BILIRUB SERPL-MCNC: 0.3 MG/DL (ref 0–1.2)
BUN SERPL-MCNC: 18 MG/DL (ref 8–23)
BUN/CREAT SERPL: 9.6 (ref 7–25)
CALCIUM SPEC-SCNC: 9.3 MG/DL (ref 8.6–10.5)
CHLORIDE SERPL-SCNC: 104 MMOL/L (ref 98–107)
CHOLEST SERPL-MCNC: 243 MG/DL (ref 0–200)
CO2 SERPL-SCNC: 23.9 MMOL/L (ref 22–29)
CREAT SERPL-MCNC: 1.87 MG/DL (ref 0.57–1)
EGFRCR SERPLBLD CKD-EPI 2021: 29.6 ML/MIN/1.73
GLOBULIN UR ELPH-MCNC: 2.8 GM/DL
GLUCOSE SERPL-MCNC: 107 MG/DL (ref 65–99)
HDLC SERPL-MCNC: 27 MG/DL (ref 40–60)
LDLC SERPL CALC-MCNC: 156 MG/DL (ref 0–100)
LDLC/HDLC SERPL: 5.64 {RATIO}
POTASSIUM SERPL-SCNC: 4.9 MMOL/L (ref 3.5–5.2)
PROT SERPL-MCNC: 7.2 G/DL (ref 6–8.5)
SODIUM SERPL-SCNC: 139 MMOL/L (ref 136–145)
TRIGL SERPL-MCNC: 319 MG/DL (ref 0–150)
VIT B12 BLD-MCNC: 264 PG/ML (ref 211–946)
VLDLC SERPL-MCNC: 60 MG/DL (ref 5–40)

## 2023-08-21 ENCOUNTER — PATIENT OUTREACH (OUTPATIENT)
Dept: PULMONOLOGY | Facility: CLINIC | Age: 63
End: 2023-08-21
Payer: MEDICARE

## 2023-08-21 ENCOUNTER — OFFICE VISIT (OUTPATIENT)
Dept: PULMONOLOGY | Facility: CLINIC | Age: 63
End: 2023-08-21
Payer: MEDICARE

## 2023-08-21 VITALS
HEART RATE: 90 BPM | WEIGHT: 194 LBS | TEMPERATURE: 97.9 F | BODY MASS INDEX: 30.45 KG/M2 | DIASTOLIC BLOOD PRESSURE: 60 MMHG | HEIGHT: 67 IN | OXYGEN SATURATION: 87 % | SYSTOLIC BLOOD PRESSURE: 122 MMHG

## 2023-08-21 DIAGNOSIS — J44.9 COPD MIXED TYPE: ICD-10-CM

## 2023-08-21 DIAGNOSIS — R06.09 DOE (DYSPNEA ON EXERTION): ICD-10-CM

## 2023-08-21 DIAGNOSIS — Z72.0 TOBACCO ABUSE: ICD-10-CM

## 2023-08-21 DIAGNOSIS — R91.8 MULTIPLE PULMONARY NODULES: Primary | ICD-10-CM

## 2023-08-21 PROCEDURE — 99204 OFFICE O/P NEW MOD 45 MIN: CPT | Performed by: INTERNAL MEDICINE

## 2023-08-21 RX ORDER — SEMAGLUTIDE 1.34 MG/ML
0.5 INJECTION, SOLUTION SUBCUTANEOUS
COMMUNITY
Start: 2023-08-10

## 2023-08-21 RX ORDER — ROSUVASTATIN CALCIUM 40 MG/1
40 TABLET, COATED ORAL DAILY
COMMUNITY

## 2023-08-21 RX ORDER — ALBUTEROL SULFATE 90 UG/1
1 AEROSOL, METERED RESPIRATORY (INHALATION)
COMMUNITY

## 2023-08-21 RX ORDER — DAPAGLIFLOZIN 10 MG/1
1 TABLET, FILM COATED ORAL DAILY
COMMUNITY

## 2023-08-21 NOTE — SIGNIFICANT NOTE
Nurse Navigator met with patient on this date.  Patient was referred to lung nodule clinic to be seen in new consultation with Dr. Roberts following an abnormal LDCT chest scan (performed on 6/29/23).  Dr. Roberts reviewed and gave printed materials on lung nodule information and Lung RADS.  Dr. Roberts recommended for patient to consider smoking cessation, to prescribe a new maintenance inhaler Rx (Trelegy), to repeat a 3 month chest CT scan (due late Sept 2023), and to return to clinic in follow up after the scan.  The role of the NN was introduced to patient.  NN contact information provided and encouraged for patient to call with any questions or concerns.  Patient verbalized understanding and is in agreement with her plan of care.

## 2023-08-22 NOTE — PROGRESS NOTES
PULMONARY  NOTE    Chief Complaint     Tobacco abuse, chronic obstructive pulmonary disease, abnormal CT scan of the chest, dyspnea on exertion    History of Present Illness     63-year-old female referred for an abnormal CT scan of the chest    She underwent a CT scan of the chest on 6/29/2023  That revealed several pulmonary nodules, max 8 mm in size  Last CT scan at our institution for comparison when is back in 2014    She has dyspnea on exertion  She would have difficulty going up 1 flight of stairs or doing her activities of daily living without being limited due to shortness of breath    She has been on Symbicort and Spiriva but she does not think they help  She has albuterol which she thinks helps more than the other inhalers    She has ongoing tobacco abuse  She would like to stop smoking  She has tried to stop smoking  She is try the nicotine patches  She has cut down from 2 packs of cigarettes per day down to half a pack cigarettes per day    Patient Active Problem List   Diagnosis    Constipation    Nausea    Irritable bowel syndrome without diarrhea    Abdominal bloating    Hematochezia    External hemorrhoids    Heartburn    Generalized abdominal pain    Therapeutic opioid induced constipation    Fatty liver    Right upper quadrant abdominal pain    Epigastric pain    Left buttock abscess    Tobacco abuse    COPD    PRAJAPATI (dyspnea on exertion)    Multiple pulmonary nodules (Max 8mm)     No Known Allergies    Current Outpatient Medications:     albuterol sulfate  (90 Base) MCG/ACT inhaler, Inhale 1 puff., Disp: , Rfl:     amitriptyline (ELAVIL) 50 MG tablet, , Disp: , Rfl:     Farxiga 10 MG tablet, Take 10 mg by mouth Daily., Disp: , Rfl:     fenofibrate (TRICOR) 145 MG tablet, Take 1 tablet by mouth Daily., Disp: , Rfl:     hydrOXYzine (ATARAX) 25 MG tablet, , Disp: , Rfl:     insulin glargine (LANTUS) 100 UNIT/ML injection, Inject  under the skin daily., Disp: , Rfl:     NOVOLOG FLEXPEN 100  UNIT/ML solution pen-injector sc pen, , Disp: , Rfl:     oxyCODONE-acetaminophen (PERCOCET) 7.5-325 MG per tablet, Take 1 tablet by mouth Every 6 (Six) Hours As Needed., Disp: , Rfl:     pantoprazole (PROTONIX) 20 MG EC tablet, Take 1 tablet by mouth Daily., Disp: 30 tablet, Rfl: 5    PARoxetine (PAXIL) 20 MG tablet, Take 1 tablet by mouth Daily. As directed., Disp: , Rfl:     pregabalin (LYRICA) 225 MG capsule, Take 1 capsule by mouth 2 (Two) Times a Day., Disp: , Rfl:     rosuvastatin (CRESTOR) 40 MG tablet, Take 1 tablet by mouth Daily. for cholesterol, Disp: , Rfl:     Semaglutide,0.25 or 0.5MG/DOS, (Ozempic, 0.25 or 0.5 MG/DOSE,) 2 MG/1.5ML solution pen-injector, Inject 0.5 mg under the skin into the appropriate area as directed Every 7 (Seven) Days., Disp: , Rfl:     Evolocumab (REPATHA) solution auto-injector SureClick injection, Inject 1 mL under the skin into the appropriate area as directed. (Patient not taking: Reported on 8/21/2023), Disp: , Rfl:   MEDICATION LIST AND ALLERGIES REVIEWED.    Family History   Problem Relation Age of Onset    Heart attack Mother         acute myocard infarction    Cancer Other         cancer and breast cancer    Diabetes Other     Heart disease Other     Multiple myeloma Other     Breast cancer Maternal Aunt     Breast cancer Maternal Aunt     Cancer Father     Multiple myeloma Father      Social History     Tobacco Use    Smoking status: Every Day     Packs/day: 1.00     Years: 45.00     Pack years: 45.00     Types: Cigarettes     Passive exposure: Current    Smokeless tobacco: Never   Vaping Use    Vaping Use: Never used   Substance Use Topics    Alcohol use: No    Drug use: No     Social History     Social History Narrative    Partnered    Ongoing tobacco abuse, now half pack cigarettes per day previously 2 packs/day    Probably greater than 80-pack-year smoking history    Has tried nicotine patches     FAMILY AND SOCIAL HISTORY REVIEWED.    Review of Systems  IF PRESENT  "REFER TO SCANNED ROS SHEET FROM SAME DATE  OTHERWISE ROS OBTAINED AND NON-CONTRIBUTORY OVER HPI.    /60 (BP Location: Left arm, Patient Position: Sitting)   Pulse 90   Temp 97.9 øF (36.6 øC)   Ht 170.2 cm (67\")   Wt 88 kg (194 lb)   SpO2 (!) 87% Comment: up to 95%  BMI 30.38 kg/mý   Physical Exam  Vitals and nursing note reviewed.   Constitutional:       General: She is not in acute distress.     Appearance: She is well-developed. She is not diaphoretic.   HENT:      Head: Normocephalic and atraumatic.   Neck:      Thyroid: No thyromegaly.   Cardiovascular:      Rate and Rhythm: Normal rate and regular rhythm.      Heart sounds: Normal heart sounds. No murmur heard.  Pulmonary:      Effort: Pulmonary effort is normal.      Breath sounds: Normal breath sounds. No stridor.   Lymphadenopathy:      Cervical: No cervical adenopathy.      Upper Body:      Right upper body: No supraclavicular or epitrochlear adenopathy.      Left upper body: No supraclavicular or epitrochlear adenopathy.   Skin:     General: Skin is warm and dry.   Neurological:      Mental Status: She is alert and oriented to person, place, and time.   Psychiatric:         Behavior: Behavior normal.       Results     Outside CT from 6/29/2023 reviewed on PACS  Bilateral pulmonary nodules, max 8 mm in size  No adenopathy  CT scan from 2014 reviewed  At that time several pulmonary nodules, max 7 mm in size    Immunization History   Administered Date(s) Administered    COVID-19 (PFIZER) Purple Cap Monovalent 02/26/2021, 03/26/2021, 10/18/2021    Covid-19 (Pfizer) Gray Cap Monovalent 04/29/2022    Flu Vaccine Intradermal Quad 18-64YR 12/04/2015    Fluzone >6mos 10/17/2016, 10/25/2017, 10/28/2019, 09/23/2020, 03/03/2022, 01/24/2023    Influenza MDCK Quadrivalent with Preserative 11/08/2018    Pneumococcal Conjugate 20-Valent (PCV20) 07/05/2022    TD Preservative Free (Tenivac) 03/07/2012     Problem List       ICD-10-CM ICD-9-CM   1. Multiple " pulmonary nodules (Max 8mm)  R91.8 793.19   2. PRAJAPATI (dyspnea on exertion)  R06.09 786.09   3. COPD  J44.9 496   4. Tobacco abuse  Z72.0 305.1       Discussion     We discussed her chest imaging  Some of her pulmonary nodules were present back in 2014  However, she does have several pulmonary nodules that appear new  Max pulmonary nodule 8 mm  I gave her literature on pulmonary nodules and management guidelines  Based on Fleischner Society guidelines I would recommend a 3-month CT scan of the chest which we will arrange for September 2023    We discussed bronchodilator therapy for her history of COPD  We are going to change her to Trelegy  I gave her samples, written instructions, and a demonstration of use  She will continue to have albuterol to use as needed    We discussed the need for smoking cessation and we discussed smoking cessation strategies  I offered to refer her to the hospital smoking cessation program    I will plan to see her back after her CT at the end of September    Moderate level of Medical Decision Making complexity based on 1 undiagnosed new problem or 2 stable chronic conditions, independent interpretation of tests, and/or prescription drug management     Gabriel Roberts MD  Note electronically signed    CC: Jackie Lr, APRN

## 2023-09-08 ENCOUNTER — LAB (OUTPATIENT)
Dept: LAB | Facility: HOSPITAL | Age: 63
End: 2023-09-08
Payer: MEDICARE

## 2023-09-08 ENCOUNTER — TRANSCRIBE ORDERS (OUTPATIENT)
Dept: ADMINISTRATIVE | Facility: HOSPITAL | Age: 63
End: 2023-09-08
Payer: MEDICARE

## 2023-09-08 ENCOUNTER — HOSPITAL ENCOUNTER (OUTPATIENT)
Dept: ULTRASOUND IMAGING | Facility: HOSPITAL | Age: 63
Discharge: HOME | End: 2023-09-08
Payer: MEDICARE

## 2023-09-08 DIAGNOSIS — N18.32 CHRONIC KIDNEY DISEASE (CKD) STAGE G3B/A1, MODERATELY DECREASED GLOMERULAR FILTRATION RATE (GFR) BETWEEN 30-44 ML/MIN/1.73 SQUARE METER AND ALBUMINURIA CREATININE RATIO LESS THAN 30 MG/G (CMS/H*: ICD-10-CM

## 2023-09-08 DIAGNOSIS — N18.32 STAGE 3B CHRONIC KIDNEY DISEASE: ICD-10-CM

## 2023-09-08 DIAGNOSIS — E55.9 VITAMIN D DEFICIENCY: ICD-10-CM

## 2023-09-08 DIAGNOSIS — E55.9 VITAMIN D DEFICIENCY: Primary | ICD-10-CM

## 2023-09-08 PROCEDURE — 82306 VITAMIN D 25 HYDROXY: CPT

## 2023-09-08 PROCEDURE — 83970 ASSAY OF PARATHORMONE: CPT

## 2023-09-08 PROCEDURE — 84156 ASSAY OF PROTEIN URINE: CPT

## 2023-09-08 PROCEDURE — 76775 US EXAM ABDO BACK WALL LIM: CPT

## 2023-09-08 PROCEDURE — 82570 ASSAY OF URINE CREATININE: CPT

## 2023-09-08 PROCEDURE — 80053 COMPREHEN METABOLIC PANEL: CPT

## 2023-09-08 PROCEDURE — 81001 URINALYSIS AUTO W/SCOPE: CPT

## 2023-09-08 PROCEDURE — 36415 COLL VENOUS BLD VENIPUNCTURE: CPT

## 2023-09-09 LAB
25(OH)D3 SERPL-MCNC: 9.7 NG/ML (ref 30–100)
ALBUMIN SERPL-MCNC: 4.3 G/DL (ref 3.5–5.2)
ALBUMIN/GLOB SERPL: 1.4 G/DL
ALP SERPL-CCNC: 56 U/L (ref 39–117)
ALT SERPL W P-5'-P-CCNC: 18 U/L (ref 1–33)
ANION GAP SERPL CALCULATED.3IONS-SCNC: 13.9 MMOL/L (ref 5–15)
AST SERPL-CCNC: 26 U/L (ref 1–32)
BACTERIA UR QL AUTO: ABNORMAL /HPF
BILIRUB SERPL-MCNC: 0.5 MG/DL (ref 0–1.2)
BILIRUB UR QL STRIP: NEGATIVE
BUN SERPL-MCNC: 22 MG/DL (ref 8–23)
BUN/CREAT SERPL: 10 (ref 7–25)
CALCIUM SPEC-SCNC: 9.4 MG/DL (ref 8.6–10.5)
CHLORIDE SERPL-SCNC: 102 MMOL/L (ref 98–107)
CLARITY UR: CLEAR
CO2 SERPL-SCNC: 21.1 MMOL/L (ref 22–29)
COLOR UR: YELLOW
CREAT SERPL-MCNC: 2.19 MG/DL (ref 0.57–1)
CREAT UR-MCNC: 46.3 MG/DL
EGFRCR SERPLBLD CKD-EPI 2021: 24.5 ML/MIN/1.73
GLOBULIN UR ELPH-MCNC: 3.1 GM/DL
GLUCOSE SERPL-MCNC: 178 MG/DL (ref 65–99)
GLUCOSE UR STRIP-MCNC: ABNORMAL MG/DL
HGB UR QL STRIP.AUTO: ABNORMAL
HYALINE CASTS UR QL AUTO: ABNORMAL /LPF
KETONES UR QL STRIP: NEGATIVE
LEUKOCYTE ESTERASE UR QL STRIP.AUTO: NEGATIVE
NITRITE UR QL STRIP: NEGATIVE
PH UR STRIP.AUTO: 6.5 [PH] (ref 5–8)
POTASSIUM SERPL-SCNC: 4.5 MMOL/L (ref 3.5–5.2)
PROT ?TM UR-MCNC: 51 MG/DL
PROT SERPL-MCNC: 7.4 G/DL (ref 6–8.5)
PROT UR QL STRIP: ABNORMAL
PROT/CREAT UR: 1101.5 MG/G CREA (ref 0–200)
PTH-INTACT SERPL-MCNC: 94.3 PG/ML (ref 15–65)
RBC # UR STRIP: ABNORMAL /HPF
REF LAB TEST METHOD: ABNORMAL
SODIUM SERPL-SCNC: 137 MMOL/L (ref 136–145)
SP GR UR STRIP: 1.02 (ref 1–1.03)
SQUAMOUS #/AREA URNS HPF: ABNORMAL /HPF
UROBILINOGEN UR QL STRIP: ABNORMAL
WBC # UR STRIP: ABNORMAL /HPF

## 2023-09-12 ENCOUNTER — TRANSCRIBE ORDERS (OUTPATIENT)
Dept: ADMINISTRATIVE | Facility: HOSPITAL | Age: 63
End: 2023-09-12
Payer: MEDICARE

## 2023-09-12 DIAGNOSIS — R31.9 HEMATURIA, UNSPECIFIED TYPE: Primary | ICD-10-CM

## 2023-09-18 ENCOUNTER — HOSPITAL ENCOUNTER (OUTPATIENT)
Dept: CT IMAGING | Facility: HOSPITAL | Age: 63
Discharge: HOME | End: 2023-09-18
Admitting: INTERNAL MEDICINE
Payer: MEDICARE

## 2023-09-18 DIAGNOSIS — R31.9 HEMATURIA, UNSPECIFIED TYPE: ICD-10-CM

## 2023-09-18 PROCEDURE — 74176 CT ABD & PELVIS W/O CONTRAST: CPT

## 2023-09-28 ENCOUNTER — OFFICE VISIT (OUTPATIENT)
Dept: UROLOGY | Facility: CLINIC | Age: 63
End: 2023-09-28
Payer: MEDICARE

## 2023-09-28 VITALS
SYSTOLIC BLOOD PRESSURE: 117 MMHG | WEIGHT: 190.2 LBS | BODY MASS INDEX: 29.85 KG/M2 | HEART RATE: 108 BPM | DIASTOLIC BLOOD PRESSURE: 63 MMHG | HEIGHT: 67 IN

## 2023-09-28 DIAGNOSIS — B37.9 YEAST INFECTION: ICD-10-CM

## 2023-09-28 DIAGNOSIS — R31.29 OTHER MICROSCOPIC HEMATURIA: Primary | ICD-10-CM

## 2023-09-28 DIAGNOSIS — N95.2 ATROPHIC VAGINITIS: ICD-10-CM

## 2023-09-28 RX ORDER — CONJUGATED ESTROGENS 0.62 MG/G
CREAM VAGINAL DAILY
Qty: 30 G | Refills: 0 | COMMUNITY
Start: 2023-09-28

## 2023-09-28 RX ORDER — NYSTATIN 100000 [USP'U]/G
POWDER TOPICAL 3 TIMES DAILY
Qty: 60 G | Refills: 1 | Status: SHIPPED | OUTPATIENT
Start: 2023-09-28

## 2023-09-28 NOTE — PROGRESS NOTES
"Chief Complaint:    Chief Complaint   Patient presents with    Blood in Urine       Vital Signs:   /63 (BP Location: Right arm, Patient Position: Sitting)   Pulse 108   Ht 170.2 cm (67.01\")   Wt 86.3 kg (190 lb 3.2 oz)   BMI 29.78 kg/m²   Body mass index is 29.78 kg/m².      HPI:  Nasrin Lizama is a 63 y.o. female who presents today for initial evaluation     History of Present Illness  Ms. Lizama presents to the clinic for evaluation of microscopic hematuria.  She has a past medical history of COPD, chronic kidney disease, Crohn's, depression, type 2 diabetes mellitus, and anxiety.  She has been referred to us by Dr. Arellano..  She has had persistent microscopic hematuria over the past year on UAs.  Her most recent 1 did show 2+ blood.  She was unable to urinate in office today.  She does have a past medical history of smoking 1 pack/day for 45 years.  She had a CT scan of the abdomen pelvis completed on the 15 that showed no abnormalities of the kidneys, ureter, or bladder.  She does also endorse swelling, itching, and discomfort of her labia.  She has a  history and underwent a complete hysterectomy 30+ years ago.  She has not had sexual intercourse in roughly 10 years.  She has tried over-the-counter hydrocortisone cream with some improvement in itching.  She states that the inflammation has waxed and waned over the past year.    Past Medical History:  Past Medical History:   Diagnosis Date    Anxiety     Arthritis     Back pain     Chronic kidney disease     COPD (chronic obstructive pulmonary disease)     Crohn's disease     Depression     Diabetes mellitus     H/O colonoscopy     Kidney disease        Current Meds:  Current Outpatient Medications   Medication Sig Dispense Refill    albuterol sulfate  (90 Base) MCG/ACT inhaler Inhale 1 puff.      amitriptyline (ELAVIL) 50 MG tablet       Evolocumab (REPATHA) solution auto-injector SureClick injection Inject 1 mL under the skin into the " appropriate area as directed.      Farxiga 10 MG tablet Take 10 mg by mouth Daily.      fenofibrate (TRICOR) 145 MG tablet Take 1 tablet by mouth Daily.      hydrOXYzine (ATARAX) 25 MG tablet       insulin glargine (LANTUS) 100 UNIT/ML injection Inject  under the skin daily.      NOVOLOG FLEXPEN 100 UNIT/ML solution pen-injector sc pen       oxyCODONE-acetaminophen (PERCOCET) 7.5-325 MG per tablet Take 1 tablet by mouth Every 6 (Six) Hours As Needed.      pantoprazole (PROTONIX) 20 MG EC tablet Take 1 tablet by mouth Daily. 30 tablet 5    PARoxetine (PAXIL) 20 MG tablet Take 1 tablet by mouth Daily. As directed.      pregabalin (LYRICA) 225 MG capsule Take 1 capsule by mouth 2 (Two) Times a Day.      rosuvastatin (CRESTOR) 40 MG tablet Take 1 tablet by mouth Daily. for cholesterol      Semaglutide,0.25 or 0.5MG/DOS, (Ozempic, 0.25 or 0.5 MG/DOSE,) 2 MG/1.5ML solution pen-injector Inject 0.5 mg under the skin into the appropriate area as directed Every 7 (Seven) Days.      Estrogens Conjugated (Premarin) 0.625 MG/GM vaginal cream Insert  into the vagina Daily. Use a very small amount the size of a green pea in vagina daily 30 g 0    nystatin (MYCOSTATIN) 498897 UNIT/GM powder Apply  topically to the appropriate area as directed 3 (Three) Times a Day. 60 g 1     No current facility-administered medications for this visit.        Allergies:   No Known Allergies     Past Surgical History:  Past Surgical History:   Procedure Laterality Date    APPENDECTOMY      BREAST BIOPSY Right 2014    benign    BREAST BIOPSY Right 2001    benign    COLONOSCOPY  2016    COLONOSCOPY N/A 6/22/2016    Procedure: COLONOSCOPY;  Surgeon: Junior Carver III, MD;  Location: [facility] OR;  Service:     COLONOSCOPY N/A 2/15/2018    Procedure: COLONOSCOPY  CPTCODE:66144;  Surgeon: Junior Carver III, MD;  Location: [facility] OR;  Service:     ENDOSCOPY N/A 6/22/2016    Procedure: ESOPHAGOGASTRODUODENOSCOPY W/ BIOPSY;  Surgeon: Junior Ellis  Mehul SOLER MD;  Location: [facility];  Service:     ENDOSCOPY N/A 2/15/2018    Procedure: ESOPHAGOGASTRODUODENOSCOPY WITH BIOPSY  CPTCODE:03070;  Surgeon: Junior Carver III, MD;  Location: [facility];  Service:     HYSTERECTOMY      NOSE SURGERY      TUBAL ABDOMINAL LIGATION      UPPER GASTROINTESTINAL ENDOSCOPY         Social History:  Social History     Socioeconomic History    Marital status:    Tobacco Use    Smoking status: Every Day     Packs/day: 1.00     Years: 45.00     Pack years: 45.00     Types: Cigarettes     Passive exposure: Current    Smokeless tobacco: Never   Vaping Use    Vaping Use: Never used   Substance and Sexual Activity    Alcohol use: No    Drug use: No    Sexual activity: Defer       Family History:  Family History   Problem Relation Age of Onset    Heart attack Mother         acute myocard infarction    Cancer Other         cancer and breast cancer    Diabetes Other     Heart disease Other     Multiple myeloma Other     Breast cancer Maternal Aunt     Breast cancer Maternal Aunt     Cancer Father     Multiple myeloma Father        Review of Systems:  Review of Systems   Constitutional:  Negative for chills, fatigue and fever.   Respiratory:  Positive for cough, shortness of breath and wheezing.    Cardiovascular:  Negative for leg swelling.   Gastrointestinal:  Positive for nausea. Negative for abdominal pain and vomiting.   Genitourinary:  Positive for difficulty urinating, hematuria and urgency. Negative for dysuria, frequency and pelvic pain.   Musculoskeletal:  Positive for back pain. Negative for joint swelling.   Neurological:  Positive for dizziness and headaches.   Psychiatric/Behavioral:  Negative for confusion.      Physical Exam:  Physical Exam  Constitutional:       General: She is not in acute distress.     Appearance: Normal appearance.   HENT:      Head: Normocephalic and atraumatic.      Nose: Nose normal.      Mouth/Throat:      Mouth: Mucous membranes are  moist.   Eyes:      Conjunctiva/sclera: Conjunctivae normal.   Cardiovascular:      Rate and Rhythm: Normal rate and regular rhythm.      Pulses: Normal pulses.      Heart sounds: Normal heart sounds.   Pulmonary:      Effort: Pulmonary effort is normal.      Breath sounds: Normal breath sounds.   Abdominal:      General: Bowel sounds are normal.      Palpations: Abdomen is soft.   Genitourinary:     Comments: Inflammation of the vulva bilaterally with erythema and swelling noted.  Atrophic vaginitis.  No incontinence with stress or prolapse noted  Musculoskeletal:         General: Normal range of motion.      Cervical back: Normal range of motion.   Skin:     General: Skin is warm.   Neurological:      General: No focal deficit present.      Mental Status: She is alert and oriented to person, place, and time.   Psychiatric:         Mood and Affect: Mood normal.         Behavior: Behavior normal.         Thought Content: Thought content normal.         Judgment: Judgment normal.           Recent Image (CT and/or KUB):   CT Abdomen and Pelvis: No results found for this or any previous visit.     CT Stone Protocol: Results for orders placed during the hospital encounter of 09/18/23    CT Abdomen Pelvis Stone Protocol    Narrative  EXAM: CT ABDOMEN PELVIS STONE PROTOCOL-      TECHNIQUE: Multiple axial CT images were obtained from lung bases  through pubic symphysis WITHOUT administration of IV contrast.  Reformatted images in the coronal and/or sagittal plane(s) were  generated from the axial data set to facilitate diagnostic accuracy  and/or surgical planning.  Oral Contrast:NONE.    Radiation dose reduction techniques were utilized per ALARA protocol.  Automated exposure control was initiated through either or [employer] or  [employer] software packages by  protocol.  DOSE:    Clinical information R31.9; R31.9-Hematuria, unspecified    Comparison 08/14/2018    FINDINGS:    Lower thorax: 3 mm nodule in the  right lung base.    Abdomen:    Liver: Homogeneous. No focal hepatic mass or ductal dilatation.    Gallbladder: No dilation or stone identified.    Pancreas: Unremarkable. No mass or ductal dilatation.    Spleen: Homogeneous. No splenomegaly.    Adrenals: No mass.    Kidneys/ureters: No mass. No obstructive uropathy.  No evidence of  urolithiasis.    GI tract: Large stool burden. There is no evidence of appendicitis    MESENTERY: No free fluid, walled off fluid collections, mesenteric  stranding, or enlarged lymph nodes      Vasculature: Evidence of atherosclerotic vascular disease    Abdominal wall: No focal hernia or mass.      Bladder: No focal mass or significant wall thickening    Reproductive: Unremarkable as visualized    Bones: No acute bony abnormality.    Impression  1. No definitive source for the patient's symptoms identified on today's  exam.      2.Other findings as above              This report was finalized on 9/18/2023 2:35 PM by Dr. Mike Guadarrama MD.     KUB: No results found for this or any previous visit.       Labs:  Brief Urine Lab Results  (Last result in the past 365 days)        Color   Clarity   Blood   Leuk Est   Nitrite   Protein   CREAT   Urine HCG        09/08/23 1422             46.3         09/08/23 1422 Yellow   Clear   Moderate (2+)   Negative   Negative   100 mg/dL (2+)                 Lab on 09/08/2023   Component Date Value Ref Range Status    25 Hydroxy, Vitamin D 09/08/2023 9.7 (L)  30.0 - 100.0 ng/ml Final    Glucose 09/08/2023 178 (H)  65 - 99 mg/dL Final    BUN 09/08/2023 22  8 - 23 mg/dL Final    Creatinine 09/08/2023 2.19 (H)  0.57 - 1.00 mg/dL Final    Sodium 09/08/2023 137  136 - 145 mmol/L Final    Potassium 09/08/2023 4.5  3.5 - 5.2 mmol/L Final    Chloride 09/08/2023 102  98 - 107 mmol/L Final    CO2 09/08/2023 21.1 (L)  22.0 - 29.0 mmol/L Final    Calcium 09/08/2023 9.4  8.6 - 10.5 mg/dL Final    Total Protein 09/08/2023 7.4  6.0 - 8.5 g/dL Final    Albumin  09/08/2023 4.3  3.5 - 5.2 g/dL Final    ALT (SGPT) 09/08/2023 18  1 - 33 U/L Final    AST (SGOT) 09/08/2023 26  1 - 32 U/L Final    Alkaline Phosphatase 09/08/2023 56  39 - 117 U/L Final    Total Bilirubin 09/08/2023 0.5  0.0 - 1.2 mg/dL Final    Globulin 09/08/2023 3.1  gm/dL Final    A/G Ratio 09/08/2023 1.4  g/dL Final    BUN/Creatinine Ratio 09/08/2023 10.0  7.0 - 25.0 Final    Anion Gap 09/08/2023 13.9  5.0 - 15.0 mmol/L Final    eGFR 09/08/2023 24.5 (L)  >60.0 mL/min/1.73 Final    Protein/Creatinine Ratio, Urine 09/08/2023 1,101.5 (H)  0.0 - 200.0 mg/G Crea Final    Creatinine, Urine 09/08/2023 46.3  mg/dL Final    Total Protein, Urine 09/08/2023 51.0  mg/dL Final    Color, UA 09/08/2023 Yellow  Yellow, Straw Final    Appearance, UA 09/08/2023 Clear  Clear Final    pH, UA 09/08/2023 6.5  5.0 - 8.0 Final    Specific Gravity, UA 09/08/2023 1.017  1.005 - 1.030 Final    Glucose, UA 09/08/2023 >=1000 mg/dL (3+) (A)  Negative Final    Ketones, UA 09/08/2023 Negative  Negative Final    Bilirubin, UA 09/08/2023 Negative  Negative Final    Blood, UA 09/08/2023 Moderate (2+) (A)  Negative Final    Protein, UA 09/08/2023 100 mg/dL (2+) (A)  Negative Final    Leuk Esterase, UA 09/08/2023 Negative  Negative Final    Nitrite, UA 09/08/2023 Negative  Negative Final    Urobilinogen, UA 09/08/2023 0.2 E.U./dL  0.2 - 1.0 E.U./dL Final    PTH, Intact 09/08/2023 94.3 (H)  15.0 - 65.0 pg/mL Final    RBC, UA 09/08/2023 13-20 (A)  None Seen, 0-2 /HPF Final    WBC, UA 09/08/2023 0-2  None Seen, 0-2 /HPF Final    Bacteria, UA 09/08/2023 None Seen  None Seen /HPF Final    Squamous Epithelial Cells, UA 09/08/2023 0-2  None Seen, 0-2 /HPF Final    Hyaline Casts, UA 09/08/2023 None Seen  None Seen /LPF Final    Methodology 09/08/2023 Automated Microscopy   Final   Lab on 08/09/2023   Component Date Value Ref Range Status    Glucose 08/09/2023 107 (H)  65 - 99 mg/dL Final    BUN 08/09/2023 18  8 - 23 mg/dL Final    Creatinine 08/09/2023  1.87 (H)  0.57 - 1.00 mg/dL Final    Sodium 08/09/2023 139  136 - 145 mmol/L Final    Potassium 08/09/2023 4.9  3.5 - 5.2 mmol/L Final    Chloride 08/09/2023 104  98 - 107 mmol/L Final    CO2 08/09/2023 23.9  22.0 - 29.0 mmol/L Final    Calcium 08/09/2023 9.3  8.6 - 10.5 mg/dL Final    Total Protein 08/09/2023 7.2  6.0 - 8.5 g/dL Final    Albumin 08/09/2023 4.4  3.5 - 5.2 g/dL Final    ALT (SGPT) 08/09/2023 25  1 - 33 U/L Final    AST (SGOT) 08/09/2023 29  1 - 32 U/L Final    Alkaline Phosphatase 08/09/2023 51  39 - 117 U/L Final    Total Bilirubin 08/09/2023 0.3  0.0 - 1.2 mg/dL Final    Globulin 08/09/2023 2.8  gm/dL Final    A/G Ratio 08/09/2023 1.6  g/dL Final    BUN/Creatinine Ratio 08/09/2023 9.6  7.0 - 25.0 Final    Anion Gap 08/09/2023 11.1  5.0 - 15.0 mmol/L Final    eGFR 08/09/2023 29.6 (L)  >60.0 mL/min/1.73 Final    Total Cholesterol 08/09/2023 243 (H)  0 - 200 mg/dL Final    Triglycerides 08/09/2023 319 (H)  0 - 150 mg/dL Final    HDL Cholesterol 08/09/2023 27 (L)  40 - 60 mg/dL Final    LDL Cholesterol  08/09/2023 156 (H)  0 - 100 mg/dL Final    VLDL Cholesterol 08/09/2023 60 (H)  5 - 40 mg/dL Final    LDL/HDL Ratio 08/09/2023 5.64   Final    Vitamin B-12 08/09/2023 264  211 - 946 pg/mL Final   Lab on 07/07/2023   Component Date Value Ref Range Status    Glucose 07/07/2023 279 (H)  65 - 99 mg/dL Final    BUN 07/07/2023 21  8 - 23 mg/dL Final    Creatinine 07/07/2023 1.88 (H)  0.57 - 1.00 mg/dL Final    Sodium 07/07/2023 136  136 - 145 mmol/L Final    Potassium 07/07/2023 5.0  3.5 - 5.2 mmol/L Final    Chloride 07/07/2023 102  98 - 107 mmol/L Final    CO2 07/07/2023 22.9  22.0 - 29.0 mmol/L Final    Calcium 07/07/2023 9.6  8.6 - 10.5 mg/dL Final    Total Protein 07/07/2023 7.2  6.0 - 8.5 g/dL Final    Albumin 07/07/2023 4.2  3.5 - 5.2 g/dL Final    ALT (SGPT) 07/07/2023 33  1 - 33 U/L Final    AST (SGOT) 07/07/2023 28  1 - 32 U/L Final    Alkaline Phosphatase 07/07/2023 68  39 - 117 U/L Final    Total  Bilirubin 07/07/2023 0.3  0.0 - 1.2 mg/dL Final    Globulin 07/07/2023 3.0  gm/dL Final    A/G Ratio 07/07/2023 1.4  g/dL Final    BUN/Creatinine Ratio 07/07/2023 11.2  7.0 - 25.0 Final    Anion Gap 07/07/2023 11.1  5.0 - 15.0 mmol/L Final    eGFR 07/07/2023 29.4 (L)  >60.0 mL/min/1.73 Final        Procedure: None  Procedures     I have reviewed and agree with the above PMH, PSH, FMH, social history, medications, allergies, and labs.     Assessment/Plan:   Problem List Items Addressed This Visit          Genitourinary and Reproductive     Other microscopic hematuria - Primary    Relevant Orders    POC Urinalysis Dipstick, Automated    Atrophic vaginitis    Relevant Medications    Estrogens Conjugated (Premarin) 0.625 MG/GM vaginal cream       Other    Yeast infection    Relevant Medications    nystatin (MYCOSTATIN) [id number] UNIT/GM powder       Health Maintenance:   Health Maintenance Due   Topic Date Due    DXA SCAN  Never done    ZOSTER VACCINE (1 of 2) Never done    LUNG CANCER SCREENING  07/03/2015    ANNUAL WELLNESS VISIT  Never done    DIABETIC FOOT EXAM  Never done    PAP SMEAR  Never done    DIABETIC EYE EXAM  Never done    MAMMOGRAM  05/31/2019    TDAP/TD VACCINES (2 - Tdap) 03/07/2022    COVID-19 Vaccine (5 - 2023-24 season) 09/01/2023        Smoking Counseling: Everyday smoker.  Never used smokeless tobacco.  Counseling given.  However not ready to quit at this time.    Urine Incontinence: Patient reports that she is not currently experiencing any symptoms of urinary incontinence.    Patient was given instructions and counseling regarding her condition or for health maintenance advice. Please see specific information pulled into the AVS if appropriate.    Patient Education:   Microscopic hematuria - discussed with the patient the causes of microscopic/gross hematuria which can include but are not limited to nephrolithiasis, interstitial cystitis, acute cystitis, urinary tract infection, bladder carcinoma,  renal cell carcinoma, or other urological abnormalities.  Discussed appropriate work-up including a CT of the abdomen pelvis.  Advised her that recent CT showed no abnormalities.  Did discuss the use of a cystoscopy in the outpatient setting for evaluation of lower urinary tract system to rule out bladder carcinoma.  Advised patient that given history of smoking she has an increased risk for bladder carcinoma and renal cell carcinoma.  Patient deferred cystoscopy at this time.  We will recheck a UA in 1 month since she was unable to urinate today.  Atrophic vaginitis -discussed with the patient causes of atrophic vaginitis including postmenopausal/post hysterectomy.  Discussed the decreasing estrogen and the pathophysiology behind this disorder.  Discussed treatment which can include vaginal creams/suppositories.  At this time recommend a trial of Premarin cream daily.  Advised patient to use a pea-sized drop into the vaginal canal once daily.  Advised patient discontinue if she begins to experience any increase in burning/pain, vaginal discharge, vaginal bleeding, or other abnormalities.  Patient verbalized understanding.  Yeast infection -discussed with the patient causes of yeast infections with her, including proper hygiene.  Given patient's swollen labia as well as satellite lesions and inflammation I am recommending nystatin powder up to 3 times daily as needed.  Advised patient to clean area with warm soap and water and dry appropriately before applying powder.  Educated patient to call if symptoms do not improve and we can try clobetasol cream twice daily for 2 weeks.  Otherwise I will follow-up with her in 1 month for reevaluation of symptoms.  Patient verbalized understanding and agreed to plan of care.    Visit Diagnoses:    ICD-10-CM ICD-9-CM   1. Other microscopic hematuria  R31.29 599.72   2. Atrophic vaginitis  N95.2 627.3   3. Yeast infection  B37.9 112.9       Meds Ordered During Visit:  New  Medications Ordered This Visit   Medications    nystatin (MYCOSTATIN) [id number] UNIT/GM powder     Sig: Apply  topically to the appropriate area as directed 3 (Three) Times a Day.     Dispense:  60 g     Refill:  1    Estrogens Conjugated (Premarin) 0.625 MG/GM vaginal cream     Sig: Insert  into the vagina Daily. Use a very small amount the size of a green pea in vagina daily     Dispense:  30 g     Refill:  0       Follow Up Appointment: 1 month  No follow-ups on file.      This document has been electronically signed by Ajay Oneil PA-C   September 28, 2023 16:14 EDT    Part of this note may be an electronic transcription/translation of spoken language to printed text using the Dragon Dictation System.

## 2023-09-29 ENCOUNTER — TRANSCRIBE ORDERS (OUTPATIENT)
Dept: ADMINISTRATIVE | Facility: HOSPITAL | Age: 63
End: 2023-09-29
Payer: MEDICARE

## 2023-09-29 ENCOUNTER — HOSPITAL ENCOUNTER (OUTPATIENT)
Dept: CT IMAGING | Facility: HOSPITAL | Age: 63
Discharge: HOME | End: 2023-09-29
Admitting: INTERNAL MEDICINE
Payer: MEDICARE

## 2023-09-29 DIAGNOSIS — R06.09 DOE (DYSPNEA ON EXERTION): ICD-10-CM

## 2023-09-29 DIAGNOSIS — Z72.0 TOBACCO ABUSE: ICD-10-CM

## 2023-09-29 DIAGNOSIS — R91.8 MULTIPLE PULMONARY NODULES: ICD-10-CM

## 2023-09-29 DIAGNOSIS — N18.32 CHRONIC KIDNEY DISEASE (CKD) STAGE G3B/A1, MODERATELY DECREASED GLOMERULAR FILTRATION RATE (GFR) BETWEEN 30-44 ML/MIN/1.73 SQUARE METER AND ALBUMINURIA CREATININE RATIO LESS THAN 30 MG/G (CMS/H*: Primary | ICD-10-CM

## 2023-09-29 DIAGNOSIS — J44.9 COPD MIXED TYPE: ICD-10-CM

## 2023-09-29 PROCEDURE — 71250 CT THORAX DX C-: CPT

## 2023-10-09 ENCOUNTER — DOCUMENTATION (OUTPATIENT)
Dept: PULMONOLOGY | Facility: CLINIC | Age: 63
End: 2023-10-09
Payer: MEDICARE

## 2023-10-09 NOTE — PROGRESS NOTES
The patient underwent a Chest CT which was stable.  Will discuss further FU scans when she comes to the office next week.

## 2023-10-23 ENCOUNTER — OFFICE VISIT (OUTPATIENT)
Dept: PULMONOLOGY | Facility: CLINIC | Age: 63
End: 2023-10-23
Payer: MEDICARE

## 2023-10-23 VITALS
TEMPERATURE: 97.2 F | BODY MASS INDEX: 30.76 KG/M2 | WEIGHT: 196 LBS | HEART RATE: 88 BPM | HEIGHT: 67 IN | SYSTOLIC BLOOD PRESSURE: 122 MMHG | DIASTOLIC BLOOD PRESSURE: 70 MMHG | OXYGEN SATURATION: 87 %

## 2023-10-23 DIAGNOSIS — R91.8 MULTIPLE PULMONARY NODULES: Primary | ICD-10-CM

## 2023-10-23 DIAGNOSIS — J44.9 COPD MIXED TYPE: ICD-10-CM

## 2023-10-23 DIAGNOSIS — R06.09 DOE (DYSPNEA ON EXERTION): ICD-10-CM

## 2023-10-23 DIAGNOSIS — Z72.0 TOBACCO ABUSE: ICD-10-CM

## 2023-10-23 RX ORDER — FLUTICASONE FUROATE, UMECLIDINIUM BROMIDE AND VILANTEROL TRIFENATATE 100; 62.5; 25 UG/1; UG/1; UG/1
1 POWDER RESPIRATORY (INHALATION)
Qty: 60 EACH | Refills: 11 | Status: SHIPPED | OUTPATIENT
Start: 2023-10-23

## 2023-10-23 RX ORDER — FLUCONAZOLE 150 MG/1
150 TABLET ORAL ONCE
Qty: 1 TABLET | Refills: 1 | Status: SHIPPED | OUTPATIENT
Start: 2023-10-23 | End: 2023-10-23

## 2023-10-23 RX ORDER — ALBUTEROL SULFATE 90 UG/1
2 AEROSOL, METERED RESPIRATORY (INHALATION) EVERY 6 HOURS PRN
Qty: 18 G | Refills: 11 | Status: SHIPPED | OUTPATIENT
Start: 2023-10-23

## 2023-10-23 NOTE — PROGRESS NOTES
PULMONARY  NOTE    Chief Complaint     Tobacco abuse, COPD, abnormal CT scan of the chest, dyspnea on exertion    History of Present Illness     63-year-old female returns today for follow-up  I initially saw her 8/21/2023    She has a history of tobacco abuse which is ongoing  She is cutting down but does not have a smoking cessation date set, yet    She had an abnormal CT scan of the chest with bilateral pulmonary nodules, max 8 mm in size  We did have imaging back to 2014 at that time largest nodule was 7 mm in size  A short interval CT scan of the chest done in September was stable as noted below    She has dyspnea on exertion  She has chronic obstructive pulmonary disease by history  She remains on Trelegy with albuterol as needed    Patient Active Problem List   Diagnosis    Constipation    Nausea    Irritable bowel syndrome without diarrhea    Abdominal bloating    Hematochezia    External hemorrhoids    Heartburn    Generalized abdominal pain    Therapeutic opioid induced constipation    Fatty liver    Right upper quadrant abdominal pain    Epigastric pain    Left buttock abscess    Tobacco abuse    COPD    PRAJAPATI (dyspnea on exertion)    Multiple pulmonary nodules (Max 8mm)    Other microscopic hematuria    Atrophic vaginitis    Yeast infection      No Known Allergies    Current Outpatient Medications:     amitriptyline (ELAVIL) 50 MG tablet, , Disp: , Rfl:     Estrogens Conjugated (Premarin) 0.625 MG/GM vaginal cream, Insert  into the vagina Daily. Use a very small amount the size of a green pea in vagina daily, Disp: 30 g, Rfl: 0    Evolocumab (REPATHA) solution auto-injector SureClick injection, Inject 1 mL under the skin into the appropriate area as directed., Disp: , Rfl:     Farxiga 10 MG tablet, Take 10 mg by mouth Daily., Disp: , Rfl:     fenofibrate (TRICOR) 145 MG tablet, Take 1 tablet by mouth Daily., Disp: , Rfl:     hydrOXYzine (ATARAX) 25 MG tablet, , Disp: , Rfl:     insulin glargine (LANTUS)  100 UNIT/ML injection, Inject  under the skin daily., Disp: , Rfl:     NOVOLOG FLEXPEN 100 UNIT/ML solution pen-injector sc pen, , Disp: , Rfl:     nystatin (MYCOSTATIN) 831967 UNIT/GM powder, Apply  topically to the appropriate area as directed 3 (Three) Times a Day., Disp: 60 g, Rfl: 1    oxyCODONE-acetaminophen (PERCOCET) 7.5-325 MG per tablet, Take 1 tablet by mouth Every 6 (Six) Hours As Needed., Disp: , Rfl:     pantoprazole (PROTONIX) 20 MG EC tablet, Take 1 tablet by mouth Daily., Disp: 30 tablet, Rfl: 5    PARoxetine (PAXIL) 20 MG tablet, Take 1 tablet by mouth Daily. As directed., Disp: , Rfl:     pregabalin (LYRICA) 225 MG capsule, Take 1 capsule by mouth 2 (Two) Times a Day., Disp: , Rfl:     rosuvastatin (CRESTOR) 40 MG tablet, Take 1 tablet by mouth Daily. for cholesterol, Disp: , Rfl:     Semaglutide,0.25 or 0.5MG/DOS, (Ozempic, 0.25 or 0.5 MG/DOSE,) 2 MG/1.5ML solution pen-injector, Inject 0.5 mg under the skin into the appropriate area as directed Every 7 (Seven) Days., Disp: , Rfl:     albuterol sulfate HFA (Ventolin HFA) 108 (90 Base) MCG/ACT inhaler, Inhale 2 puffs Every 6 (Six) Hours As Needed for Wheezing., Disp: 18 g, Rfl: 11    fluconazole (DIFLUCAN) 150 MG tablet, Take 1 tablet by mouth 1 (One) Time for 1 dose., Disp: 1 tablet, Rfl: 1    Fluticasone-Umeclidin-Vilant (Trelegy Ellipta) 100-62.5-25 MCG/ACT inhaler, Inhale 1 puff Daily., Disp: 60 each, Rfl: 11  MEDICATION LIST AND ALLERGIES REVIEWED.    Family History   Problem Relation Age of Onset    Heart attack Mother         acute myocard infarction    Cancer Other         cancer and breast cancer    Diabetes Other     Heart disease Other     Multiple myeloma Other     Breast cancer Maternal Aunt     Breast cancer Maternal Aunt     Cancer Father     Multiple myeloma Father      Social History     Tobacco Use    Smoking status: Every Day     Packs/day: 1.00     Years: 45.00     Additional pack years: 0.00     Total pack years: 45.00      "Types: Cigarettes     Passive exposure: Current    Smokeless tobacco: Never    Tobacco comments:     Down to a half.   Vaping Use    Vaping Use: Never used   Substance Use Topics    Alcohol use: No    Drug use: No     Social History     Social History Narrative    Partnered    Ongoing tobacco abuse, now half pack cigarettes per day previously 2 packs/day    Probably greater than 80-pack-year smoking history    Has tried nicotine patches     FAMILY AND SOCIAL HISTORY REVIEWED.    Review of Systems  IF PRESENT REFER TO SCANNED ROS SHEET FROM SAME DATE  OTHERWISE ROS OBTAINED AND NON-CONTRIBUTORY OVER HPI.    /70   Pulse 88   Temp 97.2 °F (36.2 °C) (Temporal)   Ht 170.2 cm (67\")   Wt 88.9 kg (196 lb)   SpO2 (!) 87% Comment: Up to 91%  BMI 30.70 kg/m²   Physical Exam  Vitals and nursing note reviewed.   Constitutional:       General: She is not in acute distress.     Appearance: She is well-developed. She is not diaphoretic.   HENT:      Head: Normocephalic and atraumatic.   Neck:      Thyroid: No thyromegaly.   Cardiovascular:      Rate and Rhythm: Normal rate and regular rhythm.      Heart sounds: Normal heart sounds. No murmur heard.  Pulmonary:      Effort: Pulmonary effort is normal.      Breath sounds: Normal breath sounds. No stridor.   Lymphadenopathy:      Cervical: No cervical adenopathy.      Upper Body:      Right upper body: No supraclavicular or epitrochlear adenopathy.      Left upper body: No supraclavicular or epitrochlear adenopathy.   Skin:     General: Skin is warm and dry.   Neurological:      Mental Status: She is alert and oriented to person, place, and time.   Psychiatric:         Behavior: Behavior normal.         Results     CT scan of the chest from 9/18/2023 reviewed on PACS  Stability in the previously noted pulmonary nodule  Immunization History   Administered Date(s) Administered    COVID-19 (PFIZER) Purple Cap Monovalent 02/26/2021, 03/26/2021, 10/18/2021    Covid-19 (Pfizer) " [place] Cap Monovalent 04/29/2022    Flu Vaccine Intradermal Quad 18-64YR 12/04/2015    Fluzone (or Fluarix & Flulaval for VFC) >6mos 10/17/2016, 10/25/2017, 10/28/2019, 09/23/2020, 03/03/2022, 01/24/2023    Influenza MDCK Quadrivalent with Preserative 11/08/2018    Pneumococcal Conjugate 20-Valent (PCV20) 07/05/2022    TD Preservative Free (Tenivac) 03/07/2012     Problem List       ICD-10-CM ICD-9-CM   1. Multiple pulmonary nodules  R91.8 793.19   2. Tobacco abuse  Z72.0 305.1   3. COPD  J44.9 496   4. PRAJAPATI (dyspnea on exertion)  R06.09 786.09       Discussion     We reviewed her chest imaging which is stable  I recommended a 6-month follow-up CT scan of the chest  We will get that scheduled for March 2024    We again discussed the need for smoking cessation and discussed smoking cessation strategies    She is can remain on Trelegy with albuterol  She feels that the Trelegy helps    I am going to prescribe Diflucan for vaginal yeast infection  Probably unrelated to the Trelegy use    I will plan to see her back after her repeat CT scan of the chest    Moderate level of Medical Decision Making complexity based on 2 or more chronic stable illnesses and an independent review of test results and/or prescription drug management.    Gabriel Roberts MD  Note electronically signed    CC: Jackie Lr, RAMYA

## 2023-10-27 ENCOUNTER — TELEPHONE (OUTPATIENT)
Dept: UROLOGY | Facility: CLINIC | Age: 63
End: 2023-10-27

## 2023-10-27 NOTE — TELEPHONE ENCOUNTER
Provider: REINA MILTON    Caller: Nasrin Lizama    Relationship to Patient: Self     Phone Number: [phone]    Reason for Call: RESCHEDULE APPOINTMENT    When was the patient last seen: 09/28/23    Notes: PATIENT IS UNABLE TO MAKE TODAY'S APPOINTMENT (10/27/23 @ 2:45 PM) DUE TO WAKING UP SICK. MOVED APPOINTMENT TO 11/01/23 @ 3:45 PM.

## 2023-11-01 ENCOUNTER — OFFICE VISIT (OUTPATIENT)
Dept: UROLOGY | Facility: CLINIC | Age: 63
End: 2023-11-01
Payer: MEDICARE

## 2023-11-01 VITALS
HEIGHT: 67 IN | SYSTOLIC BLOOD PRESSURE: 170 MMHG | DIASTOLIC BLOOD PRESSURE: 76 MMHG | WEIGHT: 192.8 LBS | HEART RATE: 89 BPM | BODY MASS INDEX: 30.26 KG/M2

## 2023-11-01 DIAGNOSIS — N95.2 ATROPHIC VAGINITIS: Primary | ICD-10-CM

## 2023-11-01 DIAGNOSIS — R31.29 OTHER MICROSCOPIC HEMATURIA: ICD-10-CM

## 2023-11-01 LAB
BILIRUB BLD-MCNC: NEGATIVE MG/DL
CLARITY, POC: CLEAR
COLOR UR: YELLOW
EXPIRATION DATE: ABNORMAL
GLUCOSE UR STRIP-MCNC: ABNORMAL MG/DL
KETONES UR QL: NEGATIVE
LEUKOCYTE EST, POC: NEGATIVE
Lab: ABNORMAL
NITRITE UR-MCNC: NEGATIVE MG/ML
PH UR: 6 [PH] (ref 5–8)
PROT UR STRIP-MCNC: ABNORMAL MG/DL
RBC # UR STRIP: ABNORMAL /UL
SP GR UR: 1.01 (ref 1–1.03)
UROBILINOGEN UR QL: NORMAL

## 2023-11-01 PROCEDURE — 87086 URINE CULTURE/COLONY COUNT: CPT

## 2023-11-01 RX ORDER — TAMSULOSIN HYDROCHLORIDE 0.4 MG/1
1 CAPSULE ORAL DAILY
Qty: 30 CAPSULE | Refills: 1 | Status: SHIPPED | OUTPATIENT
Start: 2023-11-01

## 2023-11-01 NOTE — PROGRESS NOTES
"Chief Complaint:    Chief Complaint   Patient presents with    Microhematuria     Follow up        Vital Signs:   /76   Pulse 89   Ht 170.2 cm (67\")   Wt 87.5 kg (192 lb 12.8 oz)   BMI 30.20 kg/m²   Body mass index is 30.2 kg/m².      HPI:  Nasrin Atkinson is a 63 y.o. female who presents today for follow up    History of Present Illness  Ms. atkinson presents to the clinic for follow-up for atrophic vaginitis, microscopic hematuria, and vaginal yeast infection.  She has a past medical history of chronic kidney disease and microscopic hematuria of unknown origin.  At last office visit she was started on Premarin cream and nystatin powder as needed.  She states she had minimal improvement with Premarin cream however nystatin powder has helped with swelling and itching.  She does also endorse difficulty urinating with significant hesitancy.  Urine today still shows 3+ blood, 3+ glucose, and 3+ protein.  No signs of infection on UA however I will send it off for culture.  Advised her given previous CT showed no cause of microscopic hematuria and she has persistent blood we would advise a lower tract investigation with cystoscopy.  We will get her set up for this as soon as possible.      Past Medical History:  Past Medical History:   Diagnosis Date    Anxiety     Arthritis     Back pain     Chronic kidney disease     COPD (chronic obstructive pulmonary disease)     Crohn's disease     Depression     Diabetes mellitus     H/O colonoscopy     Kidney disease        Current Meds:  Current Outpatient Medications   Medication Sig Dispense Refill    albuterol sulfate HFA (Ventolin HFA) 108 (90 Base) MCG/ACT inhaler Inhale 2 puffs Every 6 (Six) Hours As Needed for Wheezing. 18 g 11    amitriptyline (ELAVIL) 50 MG tablet       Evolocumab (REPATHA) solution auto-injector SureClick injection Inject 1 mL under the skin into the appropriate area as directed.      Farxiga 10 MG tablet Take 10 mg by mouth Daily.      " fenofibrate (TRICOR) 145 MG tablet Take 1 tablet by mouth Daily.      Fluticasone-Umeclidin-Vilant (Trelegy Ellipta) 100-62.5-25 MCG/ACT inhaler Inhale 1 puff Daily. 60 each 11    hydrOXYzine (ATARAX) 25 MG tablet       insulin glargine (LANTUS) 100 UNIT/ML injection Inject  under the skin daily.      NOVOLOG FLEXPEN 100 UNIT/ML solution pen-injector sc pen       nystatin (MYCOSTATIN) 068574 UNIT/GM powder Apply  topically to the appropriate area as directed 3 (Three) Times a Day. 60 g 1    oxyCODONE-acetaminophen (PERCOCET) 7.5-325 MG per tablet Take 1 tablet by mouth Every 6 (Six) Hours As Needed.      pantoprazole (PROTONIX) 20 MG EC tablet Take 1 tablet by mouth Daily. 30 tablet 5    PARoxetine (PAXIL) 20 MG tablet Take 1 tablet by mouth Daily. As directed.      pregabalin (LYRICA) 225 MG capsule Take 1 capsule by mouth 2 (Two) Times a Day.      rosuvastatin (CRESTOR) 40 MG tablet Take 1 tablet by mouth Daily. for cholesterol      Semaglutide,0.25 or 0.5MG/DOS, (Ozempic, 0.25 or 0.5 MG/DOSE,) 2 MG/1.5ML solution pen-injector Inject 0.5 mg under the skin into the appropriate area as directed Every 7 (Seven) Days.      tamsulosin (FLOMAX) 0.4 MG capsule 24 hr capsule Take 1 capsule by mouth Daily. 30 capsule 1     No current facility-administered medications for this visit.        Allergies:   No Known Allergies     Past Surgical History:  Past Surgical History:   Procedure Laterality Date    APPENDECTOMY      BREAST BIOPSY Right 2014    benign    BREAST BIOPSY Right 2001    benign    COLONOSCOPY  2016    COLONOSCOPY N/A 6/22/2016    Procedure: COLONOSCOPY;  Surgeon: Junior Carver III, MD;  Location:  [facility];  Service:     COLONOSCOPY N/A 2/15/2018    Procedure: COLONOSCOPY  CPTCODE:98867;  Surgeon: Junior Carver III, MD;  Location:  [facility];  Service:     ENDOSCOPY N/A 6/22/2016    Procedure: ESOPHAGOGASTRODUODENOSCOPY W/ BIOPSY;  Surgeon: Junior Carver III, MD;  Location:  [facility];   Service:     ENDOSCOPY N/A 2/15/2018    Procedure: ESOPHAGOGASTRODUODENOSCOPY WITH BIOPSY  CPTCODE:98350;  Surgeon: Junior Carver III, MD;  Location: [facility];  Service:     HYSTERECTOMY      NOSE SURGERY      TUBAL ABDOMINAL LIGATION      UPPER GASTROINTESTINAL ENDOSCOPY         Social History:  Social History     Socioeconomic History    Marital status:    Tobacco Use    Smoking status: Every Day     Packs/day: 1.00     Years: 45.00     Additional pack years: 0.00     Total pack years: 45.00     Types: Cigarettes     Passive exposure: Current    Smokeless tobacco: Never    Tobacco comments:     Down to a half.   Vaping Use    Vaping Use: Never used   Substance and Sexual Activity    Alcohol use: No    Drug use: No    Sexual activity: Defer       Family History:  Family History   Problem Relation Age of Onset    Heart attack Mother         acute myocard infarction    Cancer Other         cancer and breast cancer    Diabetes Other     Heart disease Other     Multiple myeloma Other     Breast cancer Maternal Aunt     Breast cancer Maternal Aunt     Cancer Father     Multiple myeloma Father        Review of Systems:  Review of Systems   Constitutional:  Positive for fatigue. Negative for chills, fever and unexpected weight change.   HENT:  Negative for congestion and sinus pressure.    Respiratory:  Positive for shortness of breath. Negative for chest tightness.    Cardiovascular:  Negative for chest pain.   Gastrointestinal:  Negative for abdominal pain, constipation, diarrhea, nausea and vomiting.   Genitourinary:  Positive for difficulty urinating, frequency and hematuria. Negative for dysuria, pelvic pain and urgency.   Musculoskeletal:  Negative for back pain and neck pain.   Skin:  Negative for rash.   Allergic/Immunologic: Negative for food allergies.   Neurological:  Negative for dizziness and headaches.   Hematological:  Bruises/bleeds easily.   Psychiatric/Behavioral:  Negative for confusion  and suicidal ideas. The patient is not nervous/anxious.        Physical Exam:  Physical Exam  Constitutional:       General: She is not in acute distress.     Appearance: Normal appearance.   HENT:      Head: Normocephalic and atraumatic.      Nose: Nose normal.      Mouth/Throat:      Mouth: Mucous membranes are moist.   Eyes:      Conjunctiva/sclera: Conjunctivae normal.   Cardiovascular:      Rate and Rhythm: Normal rate and regular rhythm.      Pulses: Normal pulses.      Heart sounds: Normal heart sounds.   Pulmonary:      Effort: Pulmonary effort is normal.      Breath sounds: Normal breath sounds.   Abdominal:      General: Bowel sounds are normal.      Palpations: Abdomen is soft.      Tenderness: There is no right CVA tenderness or left CVA tenderness.   Musculoskeletal:         General: Normal range of motion.      Cervical back: Normal range of motion.   Skin:     General: Skin is warm.   Neurological:      General: No focal deficit present.      Mental Status: She is alert and oriented to person, place, and time.   Psychiatric:         Mood and Affect: Mood normal.         Behavior: Behavior normal.         Thought Content: Thought content normal.         Judgment: Judgment normal.       Recent Image (CT and/or KUB):   CT Abdomen and Pelvis: No results found for this or any previous visit.     CT Stone Protocol: Results for orders placed during the hospital encounter of 09/18/23    CT Abdomen Pelvis Stone Protocol    Narrative  EXAM: CT ABDOMEN PELVIS STONE PROTOCOL-      TECHNIQUE: Multiple axial CT images were obtained from lung bases  through pubic symphysis WITHOUT administration of IV contrast.  Reformatted images in the coronal and/or sagittal plane(s) were  generated from the axial data set to facilitate diagnostic accuracy  and/or surgical planning.  Oral Contrast:NONE.    Radiation dose reduction techniques were utilized per ALARA protocol.  Automated exposure control was initiated through  either or [employer] or  [employer] software packages by  protocol.  DOSE:    Clinical information R31.9; R31.9-Hematuria, unspecified    Comparison 08/14/2018    FINDINGS:    Lower thorax: 3 mm nodule in the right lung base.    Abdomen:    Liver: Homogeneous. No focal hepatic mass or ductal dilatation.    Gallbladder: No dilation or stone identified.    Pancreas: Unremarkable. No mass or ductal dilatation.    Spleen: Homogeneous. No splenomegaly.    Adrenals: No mass.    Kidneys/ureters: No mass. No obstructive uropathy.  No evidence of  urolithiasis.    GI tract: Large stool burden. There is no evidence of appendicitis    MESENTERY: No free fluid, walled off fluid collections, mesenteric  stranding, or enlarged lymph nodes      Vasculature: Evidence of atherosclerotic vascular disease    Abdominal wall: No focal hernia or mass.      Bladder: No focal mass or significant wall thickening    Reproductive: Unremarkable as visualized    Bones: No acute bony abnormality.    Impression  1. No definitive source for the patient's symptoms identified on today's  exam.      2.Other findings as above              This report was finalized on 9/18/2023 2:35 PM by Dr. Mike Guadarrama MD.     KUB: No results found for this or any previous visit.       Labs:  Brief Urine Lab Results  (Last result in the past 365 days)        Color   Clarity   Blood   Leuk Est   Nitrite   Protein   CREAT   Urine HCG        11/01/23 1544 Yellow   Clear   3+   Negative   Negative   2+                 Office Visit on 11/01/2023   Component Date Value Ref Range Status    Color 11/01/2023 Yellow  Yellow, Straw, Dark Yellow, Stephanie Final    Clarity, UA 11/01/2023 Clear  Clear Final    Specific Gravity  11/01/2023 1.010  1.005 - 1.030 Final    pH, Urine 11/01/2023 6.0  5.0 - 8.0 Final    Leukocytes 11/01/2023 Negative  Negative Final    Nitrite, UA 11/01/2023 Negative  Negative Final    Protein, POC 11/01/2023 2+ (A)  Negative mg/dL Final     Glucose, UA 11/01/2023 3+ (A)  Negative mg/dL Final    Ketones, UA 11/01/2023 Negative  Negative Final    Urobilinogen, UA 11/01/2023 Normal  Normal, 0.2 E.U./dL Final    Bilirubin 11/01/2023 Negative  Negative Final    Blood, UA 11/01/2023 3+ (A)  Negative Final    Lot Number 11/01/2023 n   Final    Expiration Date 11/01/2023 n   Final   Lab on 09/08/2023   Component Date Value Ref Range Status    25 Hydroxy, Vitamin D 09/08/2023 9.7 (L)  30.0 - 100.0 ng/ml Final    Glucose 09/08/2023 178 (H)  65 - 99 mg/dL Final    BUN 09/08/2023 22  8 - 23 mg/dL Final    Creatinine 09/08/2023 2.19 (H)  0.57 - 1.00 mg/dL Final    Sodium 09/08/2023 137  136 - 145 mmol/L Final    Potassium 09/08/2023 4.5  3.5 - 5.2 mmol/L Final    Chloride 09/08/2023 102  98 - 107 mmol/L Final    CO2 09/08/2023 21.1 (L)  22.0 - 29.0 mmol/L Final    Calcium 09/08/2023 9.4  8.6 - 10.5 mg/dL Final    Total Protein 09/08/2023 7.4  6.0 - 8.5 g/dL Final    Albumin 09/08/2023 4.3  3.5 - 5.2 g/dL Final    ALT (SGPT) 09/08/2023 18  1 - 33 U/L Final    AST (SGOT) 09/08/2023 26  1 - 32 U/L Final    Alkaline Phosphatase 09/08/2023 56  39 - 117 U/L Final    Total Bilirubin 09/08/2023 0.5  0.0 - 1.2 mg/dL Final    Globulin 09/08/2023 3.1  gm/dL Final    A/G Ratio 09/08/2023 1.4  g/dL Final    BUN/Creatinine Ratio 09/08/2023 10.0  7.0 - 25.0 Final    Anion Gap 09/08/2023 13.9  5.0 - 15.0 mmol/L Final    eGFR 09/08/2023 24.5 (L)  >60.0 mL/min/1.73 Final    Protein/Creatinine Ratio, Urine 09/08/2023 1,101.5 (H)  0.0 - 200.0 mg/G Crea Final    Creatinine, Urine 09/08/2023 46.3  mg/dL Final    Total Protein, Urine 09/08/2023 51.0  mg/dL Final    Color, UA 09/08/2023 Yellow  Yellow, Straw Final    Appearance, UA 09/08/2023 Clear  Clear Final    pH, UA 09/08/2023 6.5  5.0 - 8.0 Final    Specific Gravity, UA 09/08/2023 1.017  1.005 - 1.030 Final    Glucose, UA 09/08/2023 >=1000 mg/dL (3+) (A)  Negative Final    Ketones, UA 09/08/2023 Negative  Negative Final     Bilirubin, UA 09/08/2023 Negative  Negative Final    Blood, UA 09/08/2023 Moderate (2+) (A)  Negative Final    Protein, UA 09/08/2023 100 mg/dL (2+) (A)  Negative Final    Leuk Esterase, UA 09/08/2023 Negative  Negative Final    Nitrite, UA 09/08/2023 Negative  Negative Final    Urobilinogen, UA 09/08/2023 0.2 E.U./dL  0.2 - 1.0 E.U./dL Final    PTH, Intact 09/08/2023 94.3 (H)  15.0 - 65.0 pg/mL Final    RBC, UA 09/08/2023 13-20 (A)  None Seen, 0-2 /HPF Final    WBC, UA 09/08/2023 0-2  None Seen, 0-2 /HPF Final    Bacteria, UA 09/08/2023 None Seen  None Seen /HPF Final    Squamous Epithelial Cells, UA 09/08/2023 0-2  None Seen, 0-2 /HPF Final    Hyaline Casts, UA 09/08/2023 None Seen  None Seen /LPF Final    Methodology 09/08/2023 Automated Microscopy   Final   Lab on 08/09/2023   Component Date Value Ref Range Status    Glucose 08/09/2023 107 (H)  65 - 99 mg/dL Final    BUN 08/09/2023 18  8 - 23 mg/dL Final    Creatinine 08/09/2023 1.87 (H)  0.57 - 1.00 mg/dL Final    Sodium 08/09/2023 139  136 - 145 mmol/L Final    Potassium 08/09/2023 4.9  3.5 - 5.2 mmol/L Final    Chloride 08/09/2023 104  98 - 107 mmol/L Final    CO2 08/09/2023 23.9  22.0 - 29.0 mmol/L Final    Calcium 08/09/2023 9.3  8.6 - 10.5 mg/dL Final    Total Protein 08/09/2023 7.2  6.0 - 8.5 g/dL Final    Albumin 08/09/2023 4.4  3.5 - 5.2 g/dL Final    ALT (SGPT) 08/09/2023 25  1 - 33 U/L Final    AST (SGOT) 08/09/2023 29  1 - 32 U/L Final    Alkaline Phosphatase 08/09/2023 51  39 - 117 U/L Final    Total Bilirubin 08/09/2023 0.3  0.0 - 1.2 mg/dL Final    Globulin 08/09/2023 2.8  gm/dL Final    A/G Ratio 08/09/2023 1.6  g/dL Final    BUN/Creatinine Ratio 08/09/2023 9.6  7.0 - 25.0 Final    Anion Gap 08/09/2023 11.1  5.0 - 15.0 mmol/L Final    eGFR 08/09/2023 29.6 (L)  >60.0 mL/min/1.73 Final    Total Cholesterol 08/09/2023 243 (H)  0 - 200 mg/dL Final    Triglycerides 08/09/2023 319 (H)  0 - 150 mg/dL Final    HDL Cholesterol 08/09/2023 27 (L)  40 - 60  mg/dL Final    LDL Cholesterol  08/09/2023 156 (H)  0 - 100 mg/dL Final    VLDL Cholesterol 08/09/2023 60 (H)  5 - 40 mg/dL Final    LDL/HDL Ratio 08/09/2023 5.64   Final    Vitamin B-12 08/09/2023 264  211 - 946 pg/mL Final        Procedure: None  Procedures     I have reviewed and agree with the above PMH, PSH, FMH, social history, medications, allergies, and labs.     Assessment/Plan:   Problem List Items Addressed This Visit          Genitourinary and Reproductive     Other microscopic hematuria    Relevant Medications    tamsulosin (FLOMAX) 0.4 MG capsule 24 hr capsule    Other Relevant Orders    Urine Culture - Urine, Urine, Random Void    Atrophic vaginitis - Primary    Relevant Orders    POC Urinalysis Dipstick, Automated (Completed)       Health Maintenance:   Health Maintenance Due   Topic Date Due    DXA SCAN  Never done    ZOSTER VACCINE (1 of 2) Never done    ANNUAL WELLNESS VISIT  Never done    DIABETIC FOOT EXAM  Never done    PAP SMEAR  Never done    DIABETIC EYE EXAM  Never done    MAMMOGRAM  05/31/2019    TDAP/TD VACCINES (2 - Tdap) 03/07/2022    INFLUENZA VACCINE  08/01/2023    COVID-19 Vaccine (6 - 2023-24 season) 09/01/2023        Smoking Counseling: Everyday smoker.  Never used smokeless tobacco.  Patient is trying to quit.  Counseling given.    Urine Incontinence: Patient reports that she is not currently experiencing any symptoms of urinary incontinence.    Patient was given instructions and counseling regarding her condition or for health maintenance advice. Please see specific information pulled into the AVS if appropriate.    Patient Education:   Microscopic hematuria -discussed with the patient causes of microscopic hematuria which can include but are not limited to nephrolithiasis, acute cystitis, urinary tract infection, bladder diverticula, UPJ obstructions, renal cell carcinoma, bladder cell carcinoma, or other urological abnormalities.  Discussed the work-up including a CT of the  abdomen pelvis was well as lower tract investigation with cystoscopy.  Given patient's CT results showed no concerns we will schedule her for a cystoscopy in office soon as possible.  Given her hesitancy and difficulty with urinating we will start her on Flomax once daily.  Discussed the risk and benefits of this medication with the patient.  Advised her to discontinue if she begins to experience lightheadedness, dizziness, blurry vision, chest pain, or shortness of breath.  Yeast infection/atrophic vaginitis -patient has had minimal improvement on Premarin cream.  We will discontinue medication at this time.  She has had some improvement with nystatin and advised her to use as needed.  Otherwise we will see her back for cystoscopy.    Visit Diagnoses:    ICD-10-CM ICD-9-CM   1. Atrophic vaginitis  N95.2 627.3   2. Other microscopic hematuria  R31.29 599.72       Meds Ordered During Visit:  New Medications Ordered This Visit   Medications    tamsulosin (FLOMAX) 0.4 MG capsule 24 hr capsule     Sig: Take 1 capsule by mouth Daily.     Dispense:  30 capsule     Refill:  1       Follow Up Appointment: Cystoscopy  No follow-ups on file.      This document has been electronically signed by Ajay Oneil PA-C   November 2, 2023 09:26 EDT    Part of this note may be an electronic transcription/translation of spoken language to printed text using the Dragon Dictation System.

## 2023-11-02 LAB — BACTERIA SPEC AEROBE CULT: ABNORMAL

## 2023-11-03 ENCOUNTER — TELEPHONE (OUTPATIENT)
Dept: UROLOGY | Facility: CLINIC | Age: 63
End: 2023-11-03
Payer: MEDICARE

## 2023-11-03 DIAGNOSIS — R31.29 OTHER MICROSCOPIC HEMATURIA: Primary | ICD-10-CM

## 2023-11-03 RX ORDER — CEFDINIR 300 MG/1
300 CAPSULE ORAL DAILY
Qty: 3 CAPSULE | Refills: 0 | Status: SHIPPED | OUTPATIENT
Start: 2023-11-03

## 2023-11-03 NOTE — TELEPHONE ENCOUNTER
Discussed with patient most recent culture results revealing 25,000 colony-forming units of Streptococcus hemolyticus.  Given patient's undergoing invasive urological procedure in December I will give her a short course of cefdinir for 3 days.

## 2023-12-20 ENCOUNTER — LAB (OUTPATIENT)
Dept: LAB | Facility: HOSPITAL | Age: 63
End: 2023-12-20
Payer: MEDICARE

## 2023-12-20 DIAGNOSIS — N18.32 CHRONIC KIDNEY DISEASE (CKD) STAGE G3B/A1, MODERATELY DECREASED GLOMERULAR FILTRATION RATE (GFR) BETWEEN 30-44 ML/MIN/1.73 SQUARE METER AND ALBUMINURIA CREATININE RATIO LESS THAN 30 MG/G (CMS/H*: ICD-10-CM

## 2023-12-20 PROCEDURE — 36415 COLL VENOUS BLD VENIPUNCTURE: CPT

## 2023-12-20 PROCEDURE — 80053 COMPREHEN METABOLIC PANEL: CPT

## 2023-12-21 LAB
ALBUMIN SERPL-MCNC: 4.4 G/DL (ref 3.5–5.2)
ALBUMIN/GLOB SERPL: 1.5 G/DL
ALP SERPL-CCNC: 104 U/L (ref 39–117)
ALT SERPL W P-5'-P-CCNC: 25 U/L (ref 1–33)
ANION GAP SERPL CALCULATED.3IONS-SCNC: 12.7 MMOL/L (ref 5–15)
AST SERPL-CCNC: 23 U/L (ref 1–32)
BILIRUB SERPL-MCNC: 0.4 MG/DL (ref 0–1.2)
BUN SERPL-MCNC: 20 MG/DL (ref 8–23)
BUN/CREAT SERPL: 10.8 (ref 7–25)
CALCIUM SPEC-SCNC: 9.1 MG/DL (ref 8.6–10.5)
CHLORIDE SERPL-SCNC: 102 MMOL/L (ref 98–107)
CO2 SERPL-SCNC: 24.3 MMOL/L (ref 22–29)
CREAT SERPL-MCNC: 1.86 MG/DL (ref 0.57–1)
EGFRCR SERPLBLD CKD-EPI 2021: 29.8 ML/MIN/1.73
GLOBULIN UR ELPH-MCNC: 2.9 GM/DL
GLUCOSE SERPL-MCNC: 247 MG/DL (ref 65–99)
POTASSIUM SERPL-SCNC: 4.6 MMOL/L (ref 3.5–5.2)
PROT SERPL-MCNC: 7.3 G/DL (ref 6–8.5)
SODIUM SERPL-SCNC: 139 MMOL/L (ref 136–145)

## 2023-12-22 ENCOUNTER — LAB (OUTPATIENT)
Dept: LAB | Facility: HOSPITAL | Age: 63
End: 2023-12-22
Payer: MEDICARE

## 2023-12-22 DIAGNOSIS — N18.32 CHRONIC KIDNEY DISEASE, STAGE 3B: ICD-10-CM

## 2023-12-22 PROCEDURE — 84156 ASSAY OF PROTEIN URINE: CPT

## 2023-12-22 PROCEDURE — 81050 URINALYSIS VOLUME MEASURE: CPT

## 2023-12-22 PROCEDURE — 82570 ASSAY OF URINE CREATININE: CPT

## 2023-12-23 LAB
COLLECT DURATION TIME UR: 24 HRS
CREAT UR-MCNC: 45.5 MG/DL
PROT 24H UR-MRATE: 1430 MG/24HOURS (ref 0–150)
PROT ?TM UR-MCNC: 67.7 MG/DL
PROT/CREAT UR: 1487.9 MG/G CREA (ref 0–200)
SPECIMEN VOL 24H UR: 2000 ML

## 2024-03-28 ENCOUNTER — LAB (OUTPATIENT)
Dept: LAB | Facility: HOSPITAL | Age: 64
End: 2024-03-28
Payer: MEDICARE

## 2024-03-28 ENCOUNTER — TRANSCRIBE ORDERS (OUTPATIENT)
Dept: ADMINISTRATIVE | Facility: HOSPITAL | Age: 64
End: 2024-03-28
Payer: MEDICARE

## 2024-03-28 DIAGNOSIS — Z79.4 ENCOUNTER FOR LONG-TERM (CURRENT) USE OF INSULIN: ICD-10-CM

## 2024-03-28 DIAGNOSIS — I10 HYPERTENSION, UNSPECIFIED TYPE: ICD-10-CM

## 2024-03-28 DIAGNOSIS — N18.32 CHRONIC KIDNEY DISEASE, STAGE 3B: ICD-10-CM

## 2024-03-28 DIAGNOSIS — E11.9 DIABETES MELLITUS WITHOUT COMPLICATION: Primary | ICD-10-CM

## 2024-03-28 DIAGNOSIS — E11.9 DIABETES MELLITUS WITHOUT COMPLICATION: ICD-10-CM

## 2024-03-28 DIAGNOSIS — N18.32 CHRONIC KIDNEY DISEASE, STAGE 3B: Primary | ICD-10-CM

## 2024-03-28 LAB
BASOPHILS # BLD AUTO: 0.04 10*3/MM3 (ref 0–0.2)
BASOPHILS NFR BLD AUTO: 1 % (ref 0–1.5)
DEPRECATED RDW RBC AUTO: 44 FL (ref 37–54)
EOSINOPHIL # BLD AUTO: 0.17 10*3/MM3 (ref 0–0.4)
EOSINOPHIL NFR BLD AUTO: 4.4 % (ref 0.3–6.2)
ERYTHROCYTE [DISTWIDTH] IN BLOOD BY AUTOMATED COUNT: 13 % (ref 12.3–15.4)
HBA1C MFR BLD: 6.3 % (ref 4.8–5.6)
HCT VFR BLD AUTO: 34.4 % (ref 34–46.6)
HGB BLD-MCNC: 10.9 G/DL (ref 12–15.9)
IMM GRANULOCYTES # BLD AUTO: 0.02 10*3/MM3 (ref 0–0.05)
IMM GRANULOCYTES NFR BLD AUTO: 0.5 % (ref 0–0.5)
LYMPHOCYTES # BLD AUTO: 0.94 10*3/MM3 (ref 0.7–3.1)
LYMPHOCYTES NFR BLD AUTO: 24.4 % (ref 19.6–45.3)
MCH RBC QN AUTO: 29.5 PG (ref 26.6–33)
MCHC RBC AUTO-ENTMCNC: 31.7 G/DL (ref 31.5–35.7)
MCV RBC AUTO: 93 FL (ref 79–97)
MONOCYTES # BLD AUTO: 0.32 10*3/MM3 (ref 0.1–0.9)
MONOCYTES NFR BLD AUTO: 8.3 % (ref 5–12)
NEUTROPHILS NFR BLD AUTO: 2.37 10*3/MM3 (ref 1.7–7)
NEUTROPHILS NFR BLD AUTO: 61.4 % (ref 42.7–76)
NRBC BLD AUTO-RTO: 0 /100 WBC (ref 0–0.2)
PLATELET # BLD AUTO: 182 10*3/MM3 (ref 140–450)
PMV BLD AUTO: 11.6 FL (ref 6–12)
RBC # BLD AUTO: 3.7 10*6/MM3 (ref 3.77–5.28)
WBC NRBC COR # BLD AUTO: 3.86 10*3/MM3 (ref 3.4–10.8)

## 2024-03-28 PROCEDURE — 82607 VITAMIN B-12: CPT

## 2024-03-28 PROCEDURE — 80053 COMPREHEN METABOLIC PANEL: CPT

## 2024-03-28 PROCEDURE — 85025 COMPLETE CBC W/AUTO DIFF WBC: CPT

## 2024-03-28 PROCEDURE — 82570 ASSAY OF URINE CREATININE: CPT

## 2024-03-28 PROCEDURE — 84156 ASSAY OF PROTEIN URINE: CPT

## 2024-03-28 PROCEDURE — 84439 ASSAY OF FREE THYROXINE: CPT

## 2024-03-28 PROCEDURE — 80061 LIPID PANEL: CPT

## 2024-03-28 PROCEDURE — 36415 COLL VENOUS BLD VENIPUNCTURE: CPT

## 2024-03-28 PROCEDURE — 81001 URINALYSIS AUTO W/SCOPE: CPT

## 2024-03-28 PROCEDURE — 84443 ASSAY THYROID STIM HORMONE: CPT

## 2024-03-28 PROCEDURE — 83036 HEMOGLOBIN GLYCOSYLATED A1C: CPT

## 2024-03-29 LAB
ALBUMIN SERPL-MCNC: 4.2 G/DL (ref 3.5–5.2)
ALBUMIN/GLOB SERPL: 1.4 G/DL
ALP SERPL-CCNC: 100 U/L (ref 39–117)
ALT SERPL W P-5'-P-CCNC: 29 U/L (ref 1–33)
ANION GAP SERPL CALCULATED.3IONS-SCNC: 10 MMOL/L (ref 5–15)
AST SERPL-CCNC: 21 U/L (ref 1–32)
BACTERIA UR QL AUTO: ABNORMAL /HPF
BILIRUB SERPL-MCNC: 0.3 MG/DL (ref 0–1.2)
BILIRUB UR QL STRIP: NEGATIVE
BUN SERPL-MCNC: 16 MG/DL (ref 8–23)
BUN/CREAT SERPL: 8.2 (ref 7–25)
CALCIUM SPEC-SCNC: 9.3 MG/DL (ref 8.6–10.5)
CHLORIDE SERPL-SCNC: 108 MMOL/L (ref 98–107)
CHOLEST SERPL-MCNC: 119 MG/DL (ref 0–200)
CLARITY UR: CLEAR
CO2 SERPL-SCNC: 26 MMOL/L (ref 22–29)
COLOR UR: YELLOW
CREAT SERPL-MCNC: 1.96 MG/DL (ref 0.57–1)
CREAT UR-MCNC: 44.8 MG/DL
EGFRCR SERPLBLD CKD-EPI 2021: 27.9 ML/MIN/1.73
GLOBULIN UR ELPH-MCNC: 3.1 GM/DL
GLUCOSE SERPL-MCNC: 126 MG/DL (ref 65–99)
GLUCOSE UR STRIP-MCNC: ABNORMAL MG/DL
HDLC SERPL-MCNC: 28 MG/DL (ref 40–60)
HGB UR QL STRIP.AUTO: ABNORMAL
HYALINE CASTS UR QL AUTO: ABNORMAL /LPF
KETONES UR QL STRIP: NEGATIVE
LDLC SERPL CALC-MCNC: 55 MG/DL (ref 0–100)
LDLC/HDLC SERPL: 1.66 {RATIO}
LEUKOCYTE ESTERASE UR QL STRIP.AUTO: NEGATIVE
NITRITE UR QL STRIP: NEGATIVE
PH UR STRIP.AUTO: 6.5 [PH] (ref 5–8)
POTASSIUM SERPL-SCNC: 5.1 MMOL/L (ref 3.5–5.2)
PROT ?TM UR-MCNC: 41.7 MG/DL
PROT SERPL-MCNC: 7.3 G/DL (ref 6–8.5)
PROT UR QL STRIP: ABNORMAL
PROT/CREAT UR: 930.8 MG/G CREA (ref 0–200)
RBC # UR STRIP: ABNORMAL /HPF
REF LAB TEST METHOD: ABNORMAL
SODIUM SERPL-SCNC: 144 MMOL/L (ref 136–145)
SP GR UR STRIP: 1.01 (ref 1–1.03)
SQUAMOUS #/AREA URNS HPF: ABNORMAL /HPF
T4 FREE SERPL-MCNC: 0.73 NG/DL (ref 0.93–1.7)
TRIGL SERPL-MCNC: 223 MG/DL (ref 0–150)
TSH SERPL DL<=0.05 MIU/L-ACNC: 2.89 UIU/ML (ref 0.27–4.2)
UROBILINOGEN UR QL STRIP: ABNORMAL
VIT B12 BLD-MCNC: 244 PG/ML (ref 211–946)
VLDLC SERPL-MCNC: 36 MG/DL (ref 5–40)
WBC # UR STRIP: ABNORMAL /HPF

## 2024-05-02 ENCOUNTER — OFFICE VISIT (OUTPATIENT)
Dept: UROLOGY | Facility: CLINIC | Age: 64
End: 2024-05-02
Payer: MEDICARE

## 2024-05-02 VITALS
DIASTOLIC BLOOD PRESSURE: 94 MMHG | BODY MASS INDEX: 28.72 KG/M2 | HEIGHT: 67 IN | SYSTOLIC BLOOD PRESSURE: 147 MMHG | WEIGHT: 183 LBS | HEART RATE: 103 BPM

## 2024-05-02 DIAGNOSIS — R31.29 OTHER MICROSCOPIC HEMATURIA: Primary | ICD-10-CM

## 2024-05-02 NOTE — PROGRESS NOTES
"Chief Complaint:    Chief Complaint   Patient presents with    Blood in Urine       Vital Signs:   /94   Pulse 103   Ht 170.2 cm (67.01\")   Wt 83 kg (183 lb)   BMI 28.66 kg/m²   Body mass index is 28.66 kg/m².      HPI:  Nasrin Atkinson is a 63 y.o. female who presents today for follow up    History of Present Illness  Ms. atkinson presents to the clinic for follow-up for microscopic hematuria.  She was initially seen in September 2023 and referred to us by Dr. Arellano for an appropriate workup.  She had a CT scan of the abdomen pelvis completed at that time that showed no abnormalities of the kidneys, ureter, or bladder.  She had no significant bladder wall thickening or masses noted.  She was scheduled to undergo a cystoscopy with Dr. Unger in December 2023 however has canceled several appointments since.  She returns today for further discussion of cystoscopy.  She denies any significant gross hematuria but reports that she has persistent microscopic blood on urine analysis when tested.  She did have a recent urine analysis on 3/28/2023 that showed 2+ blood, 1+ protein, and 3+ glucose.  No signs of leukocytes or nitrites.  On microscopic UA she had 6-10 red blood cells, 0-2 white blood cells, no bacteria seen.  She also had a recent CMP completed around that time that showed stable chronic kidney disease stage IV with a GFR of roughly 28 and creatinine of roughly 1.96-2.  She has had a negative cystoscopy completed by Dr. Vila in 2018.  She states she needs a cystoscopy completed before June with her follow-up with Dr. Arellano.  Will get her scheduled in office with Dr. Unger soon as possible per her request.      Past Medical History:  Past Medical History:   Diagnosis Date    Anxiety     Arthritis     Back pain     Chronic kidney disease     COPD (chronic obstructive pulmonary disease)     Crohn's disease     Depression     Diabetes mellitus     H/O colonoscopy     Kidney disease        Current " Meds:  Current Outpatient Medications   Medication Sig Dispense Refill    albuterol sulfate HFA (Ventolin HFA) 108 (90 Base) MCG/ACT inhaler Inhale 2 puffs Every 6 (Six) Hours As Needed for Wheezing. 18 g 11    amitriptyline (ELAVIL) 50 MG tablet       Evolocumab (REPATHA) solution auto-injector SureClick injection Inject 1 mL under the skin into the appropriate area as directed.      Farxiga 10 MG tablet Take 10 mg by mouth Daily.      fenofibrate (TRICOR) 145 MG tablet Take 1 tablet by mouth Daily.      Fluticasone-Umeclidin-Vilant (Trelegy Ellipta) 100-62.5-25 MCG/ACT inhaler Inhale 1 puff Daily. 60 each 11    hydrOXYzine (ATARAX) 25 MG tablet       insulin glargine (LANTUS) 100 UNIT/ML injection Inject  under the skin daily.      NOVOLOG FLEXPEN 100 UNIT/ML solution pen-injector sc pen       nystatin (MYCOSTATIN) 983855 UNIT/GM powder Apply  topically to the appropriate area as directed 3 (Three) Times a Day. 60 g 1    oxyCODONE-acetaminophen (PERCOCET) 7.5-325 MG per tablet Take 1 tablet by mouth Every 6 (Six) Hours As Needed.      pantoprazole (PROTONIX) 20 MG EC tablet Take 1 tablet by mouth Daily. 30 tablet 5    PARoxetine (PAXIL) 20 MG tablet Take 1 tablet by mouth Daily. As directed.      pregabalin (LYRICA) 225 MG capsule Take 1 capsule by mouth 2 (Two) Times a Day.      rosuvastatin (CRESTOR) 40 MG tablet Take 1 tablet by mouth Daily. for cholesterol      Semaglutide,0.25 or 0.5MG/DOS, (Ozempic, 0.25 or 0.5 MG/DOSE,) 2 MG/1.5ML solution pen-injector Inject 0.5 mg under the skin into the appropriate area as directed Every 7 (Seven) Days.      cefdinir (OMNICEF) 300 MG capsule Take 1 capsule by mouth Daily. (Patient not taking: Reported on 5/2/2024) 3 capsule 0    tamsulosin (FLOMAX) 0.4 MG capsule 24 hr capsule Take 1 capsule by mouth Daily. (Patient not taking: Reported on 5/2/2024) 30 capsule 1     No current facility-administered medications for this visit.        Allergies:   No Known Allergies      Past Surgical History:  Past Surgical History:   Procedure Laterality Date    APPENDECTOMY      BREAST BIOPSY Right 2014    benign    BREAST BIOPSY Right 2001    benign    COLONOSCOPY  2016    COLONOSCOPY N/A 6/22/2016    Procedure: COLONOSCOPY;  Surgeon: Junior Carver III, MD;  Location: [facility] OR;  Service:     COLONOSCOPY N/A 2/15/2018    Procedure: COLONOSCOPY  CPTCODE:99653;  Surgeon: Junior Carver III, MD;  Location: [facility] OR;  Service:     ENDOSCOPY N/A 6/22/2016    Procedure: ESOPHAGOGASTRODUODENOSCOPY W/ BIOPSY;  Surgeon: Junior Carver III, MD;  Location: [facility] OR;  Service:     ENDOSCOPY N/A 2/15/2018    Procedure: ESOPHAGOGASTRODUODENOSCOPY WITH BIOPSY  CPTCODE:12481;  Surgeon: Junior Carver III, MD;  Location: [facility] OR;  Service:     HYSTERECTOMY      NOSE SURGERY      TUBAL ABDOMINAL LIGATION      UPPER GASTROINTESTINAL ENDOSCOPY         Social History:  Social History     Socioeconomic History    Marital status:    Tobacco Use    Smoking status: Every Day     Current packs/day: 1.00     Average packs/day: 1 pack/day for 45.0 years (45.0 ttl pk-yrs)     Types: Cigarettes     Passive exposure: Current    Smokeless tobacco: Never    Tobacco comments:     Down to a half.   Vaping Use    Vaping status: Never Used   Substance and Sexual Activity    Alcohol use: No    Drug use: No    Sexual activity: Defer       Family History:  Family History   Problem Relation Age of Onset    Heart attack Mother         acute myocard infarction    Cancer Other         cancer and breast cancer    Diabetes Other     Heart disease Other     Multiple myeloma Other     Breast cancer Maternal Aunt     Breast cancer Maternal Aunt     Cancer Father     Multiple myeloma Father        Review of Systems:  Review of Systems   Constitutional:  Negative for fatigue, fever and unexpected weight change.   Respiratory:  Positive for shortness of breath. Negative for chest tightness.    Cardiovascular:   Negative for chest pain.   Gastrointestinal:  Positive for constipation and nausea. Negative for abdominal pain, diarrhea and vomiting.   Genitourinary:  Positive for hematuria. Negative for difficulty urinating, dysuria, frequency and urgency.   Skin:  Negative for rash.   Hematological:  Does not bruise/bleed easily.   Psychiatric/Behavioral:  Negative for confusion and suicidal ideas.        Physical Exam:  Physical Exam  Constitutional:       General: She is not in acute distress.     Appearance: Normal appearance.   HENT:      Head: Normocephalic and atraumatic.      Nose: Nose normal.      Mouth/Throat:      Mouth: Mucous membranes are moist.   Eyes:      Conjunctiva/sclera: Conjunctivae normal.   Cardiovascular:      Rate and Rhythm: Normal rate and regular rhythm.      Pulses: Normal pulses.      Heart sounds: Normal heart sounds.   Pulmonary:      Effort: Pulmonary effort is normal.      Breath sounds: Normal breath sounds.   Abdominal:      General: Bowel sounds are normal.      Palpations: Abdomen is soft.   Musculoskeletal:         General: Normal range of motion.      Cervical back: Normal range of motion.   Skin:     General: Skin is warm.   Neurological:      General: No focal deficit present.      Mental Status: She is alert and oriented to person, place, and time.   Psychiatric:         Mood and Affect: Mood normal.         Behavior: Behavior normal.         Thought Content: Thought content normal.         Judgment: Judgment normal.           Recent Image (CT and/or KUB):   CT Abdomen and Pelvis: No results found for this or any previous visit.     CT Stone Protocol: Results for orders placed during the hospital encounter of 09/18/23    CT Abdomen Pelvis Stone Protocol    Narrative  EXAM: CT ABDOMEN PELVIS STONE PROTOCOL-      TECHNIQUE: Multiple axial CT images were obtained from lung bases  through pubic symphysis WITHOUT administration of IV contrast.  Reformatted images in the coronal and/or  sagittal plane(s) were  generated from the axial data set to facilitate diagnostic accuracy  and/or surgical planning.  Oral Contrast:NONE.    Radiation dose reduction techniques were utilized per ALARA protocol.  Automated exposure control was initiated through either or [employer] or  [employer] software packages by  protocol.  DOSE:    Clinical information R31.9; R31.9-Hematuria, unspecified    Comparison 08/14/2018    FINDINGS:    Lower thorax: 3 mm nodule in the right lung base.    Abdomen:    Liver: Homogeneous. No focal hepatic mass or ductal dilatation.    Gallbladder: No dilation or stone identified.    Pancreas: Unremarkable. No mass or ductal dilatation.    Spleen: Homogeneous. No splenomegaly.    Adrenals: No mass.    Kidneys/ureters: No mass. No obstructive uropathy.  No evidence of  urolithiasis.    GI tract: Large stool burden. There is no evidence of appendicitis    MESENTERY: No free fluid, walled off fluid collections, mesenteric  stranding, or enlarged lymph nodes      Vasculature: Evidence of atherosclerotic vascular disease    Abdominal wall: No focal hernia or mass.      Bladder: No focal mass or significant wall thickening    Reproductive: Unremarkable as visualized    Bones: No acute bony abnormality.    Impression  1. No definitive source for the patient's symptoms identified on today's  exam.      2.Other findings as above              This report was finalized on 9/18/2023 2:35 PM by Dr. Mike Guadarrama MD.     KUB: No results found for this or any previous visit.       Labs:  Brief Urine Lab Results  (Last result in the past 365 days)        Color   Clarity   Blood   Leuk Est   Nitrite   Protein   CREAT   Urine HCG        03/28/24 1453 Yellow   Clear   Moderate (2+)   Negative   Negative   30 mg/dL (1+)           03/28/24 1453             44.8               Lab on 03/28/2024   Component Date Value Ref Range Status    Total Cholesterol 03/28/2024 119  0 - 200 mg/dL Final     Triglycerides 03/28/2024 223 (H)  0 - 150 mg/dL Final    HDL Cholesterol 03/28/2024 28 (L)  40 - 60 mg/dL Final    LDL Cholesterol  03/28/2024 55  0 - 100 mg/dL Final    VLDL Cholesterol 03/28/2024 36  5 - 40 mg/dL Final    LDL/HDL Ratio 03/28/2024 1.66   Final   Lab on 03/28/2024   Component Date Value Ref Range Status    Color, UA 03/28/2024 Yellow  Yellow, Straw Final    Appearance, UA 03/28/2024 Clear  Clear Final    pH, UA 03/28/2024 6.5  5.0 - 8.0 Final    Specific Gravity, UA 03/28/2024 1.014  1.005 - 1.030 Final    Glucose, UA 03/28/2024 >=1000 mg/dL (3+) (A)  Negative Final    Ketones, UA 03/28/2024 Negative  Negative Final    Bilirubin, UA 03/28/2024 Negative  Negative Final    Blood, UA 03/28/2024 Moderate (2+) (A)  Negative Final    Protein, UA 03/28/2024 30 mg/dL (1+) (A)  Negative Final    Leuk Esterase, UA 03/28/2024 Negative  Negative Final    Nitrite, UA 03/28/2024 Negative  Negative Final    Urobilinogen, UA 03/28/2024 0.2 E.U./dL  0.2 - 1.0 E.U./dL Final    Protein/Creatinine Ratio, Urine 03/28/2024 930.8 (H)  0.0 - 200.0 mg/G Crea Final    Creatinine, Urine 03/28/2024 44.8  mg/dL Final    Total Protein, Urine 03/28/2024 41.7  mg/dL Final    Glucose 03/28/2024 126 (H)  65 - 99 mg/dL Final    BUN 03/28/2024 16  8 - 23 mg/dL Final    Creatinine 03/28/2024 1.96 (H)  0.57 - 1.00 mg/dL Final    Sodium 03/28/2024 144  136 - 145 mmol/L Final    Potassium 03/28/2024 5.1  3.5 - 5.2 mmol/L Final    Chloride 03/28/2024 108 (H)  98 - 107 mmol/L Final    CO2 03/28/2024 26.0  22.0 - 29.0 mmol/L Final    Calcium 03/28/2024 9.3  8.6 - 10.5 mg/dL Final    Total Protein 03/28/2024 7.3  6.0 - 8.5 g/dL Final    Albumin 03/28/2024 4.2  3.5 - 5.2 g/dL Final    ALT (SGPT) 03/28/2024 29  1 - 33 U/L Final    AST (SGOT) 03/28/2024 21  1 - 32 U/L Final    Alkaline Phosphatase 03/28/2024 100  39 - 117 U/L Final    Total Bilirubin 03/28/2024 0.3  0.0 - 1.2 mg/dL Final    Globulin 03/28/2024 3.1  gm/dL Final    A/G Ratio  03/28/2024 1.4  g/dL Final    BUN/Creatinine Ratio 03/28/2024 8.2  7.0 - 25.0 Final    Anion Gap 03/28/2024 10.0  5.0 - 15.0 mmol/L Final    eGFR 03/28/2024 27.9 (L)  >60.0 mL/min/1.73 Final    Hemoglobin A1C 03/28/2024 6.30 (H)  4.80 - 5.60 % Final    TSH 03/28/2024 2.890  0.270 - 4.200 uIU/mL Final    Free T4 03/28/2024 0.73 (L)  0.93 - 1.70 ng/dL Final    Vitamin B-12 03/28/2024 244  211 - 946 pg/mL Final    WBC 03/28/2024 3.86  3.40 - 10.80 10*3/mm3 Final    RBC 03/28/2024 3.70 (L)  3.77 - 5.28 10*6/mm3 Final    Hemoglobin 03/28/2024 10.9 (L)  12.0 - 15.9 g/dL Final    Hematocrit 03/28/2024 34.4  34.0 - 46.6 % Final    MCV 03/28/2024 93.0  79.0 - 97.0 fL Final    MCH 03/28/2024 29.5  26.6 - 33.0 pg Final    MCHC 03/28/2024 31.7  31.5 - 35.7 g/dL Final    RDW 03/28/2024 13.0  12.3 - 15.4 % Final    RDW-SD 03/28/2024 44.0  37.0 - 54.0 fl Final    MPV 03/28/2024 11.6  6.0 - 12.0 fL Final    Platelets 03/28/2024 182  140 - 450 10*3/mm3 Final    Neutrophil % 03/28/2024 61.4  42.7 - 76.0 % Final    Lymphocyte % 03/28/2024 24.4  19.6 - 45.3 % Final    Monocyte % 03/28/2024 8.3  5.0 - 12.0 % Final    Eosinophil % 03/28/2024 4.4  0.3 - 6.2 % Final    Basophil % 03/28/2024 1.0  0.0 - 1.5 % Final    Immature Grans % 03/28/2024 0.5  0.0 - 0.5 % Final    Neutrophils, Absolute 03/28/2024 2.37  1.70 - 7.00 10*3/mm3 Final    Lymphocytes, Absolute 03/28/2024 0.94  0.70 - 3.10 10*3/mm3 Final    Monocytes, Absolute 03/28/2024 0.32  0.10 - 0.90 10*3/mm3 Final    Eosinophils, Absolute 03/28/2024 0.17  0.00 - 0.40 10*3/mm3 Final    Basophils, Absolute 03/28/2024 0.04  0.00 - 0.20 10*3/mm3 Final    Immature Grans, Absolute 03/28/2024 0.02  0.00 - 0.05 10*3/mm3 Final    nRBC 03/28/2024 0.0  0.0 - 0.2 /100 WBC Final    RBC, UA 03/28/2024 6-10 (A)  None Seen, 0-2 /HPF Final    WBC, UA 03/28/2024 0-2  None Seen, 0-2 /HPF Final    Bacteria, UA 03/28/2024 None Seen  None Seen /HPF Final    Squamous Epithelial Cells, UA 03/28/2024 0-2  None  Seen, 0-2 /HPF Final    Hyaline Casts, UA 03/28/2024 None Seen  None Seen /LPF Final    Methodology 03/28/2024 Automated Microscopy   Final        Procedure: None  Procedures     I have reviewed and agree with the above PMH, PSH, FMH, social history, medications, allergies, and labs.     Assessment/Plan:   Problem List Items Addressed This Visit       Other microscopic hematuria - Primary       Health Maintenance:   Health Maintenance Due   Topic Date Due    DXA SCAN  Never done    DIABETIC EYE EXAM  Never done    ZOSTER VACCINE (1 of 2) Never done    ANNUAL WELLNESS VISIT  Never done    DIABETIC FOOT EXAM  Never done    PAP SMEAR  Never done    RSV Vaccine - Adults (1 - 1-dose 60+ series) Never done    MAMMOGRAM  05/31/2019    TDAP/TD VACCINES (2 - Tdap) 03/07/2022    COVID-19 Vaccine (7 - 2023-24 season) 09/01/2023        Smoking Counseling: Everyday smoker.  Never used smokeless tobacco.  Current passive exposure to smoke as well.  Counseling given and currently down to half a pack.    Urine Incontinence: Patient reports that she is not currently experiencing any symptoms of urinary incontinence.    Patient was given instructions and counseling regarding her condition or for health maintenance advice. Please see specific information pulled into the AVS if appropriate.    Patient Education:   Microscopic hematuria -patient has persistent microscopic hematuria on UAs with a negative workup of CT scan.  She denies any significant gross hematuria and does not have any recurrent urinary tract infection with most recent UA being unremarkable.  She was unable to urinate in office today but is desirous to undergo a cystoscopy in office by Dr. Unger.  Discussed the procedure in detail including risk and benefits.  We will get her scheduled soon as possible in office.  Advised to return to the clinic sooner if needed.  She verbalized understanding and agreed to plan of care.    Visit Diagnoses:    ICD-10-CM ICD-9-CM   1.  Other microscopic hematuria  R31.29 599.72       Meds Ordered During Visit:  No orders of the defined types were placed in this encounter.      Follow Up Appointment: 2 weeks for cystoscopy with Dr. Unger  No follow-ups on file.      This document has been electronically signed by Ajay Oneil PA-C   May 2, 2024 15:04 EDT    Part of this note may be an electronic transcription/translation of spoken language to printed text using the Dragon Dictation System.

## 2024-05-14 ENCOUNTER — PROCEDURE VISIT (OUTPATIENT)
Dept: UROLOGY | Facility: CLINIC | Age: 64
End: 2024-05-14
Payer: MEDICARE

## 2024-05-14 VITALS
HEIGHT: 67 IN | SYSTOLIC BLOOD PRESSURE: 152 MMHG | HEART RATE: 98 BPM | BODY MASS INDEX: 28.69 KG/M2 | WEIGHT: 182.8 LBS | DIASTOLIC BLOOD PRESSURE: 75 MMHG

## 2024-05-14 DIAGNOSIS — R31.29 OTHER MICROSCOPIC HEMATURIA: ICD-10-CM

## 2024-05-14 DIAGNOSIS — N95.2 ATROPHIC VAGINITIS: Primary | ICD-10-CM

## 2024-05-14 RX ORDER — GENTAMICIN 40 MG/ML
80 INJECTION, SOLUTION INTRAMUSCULAR; INTRAVENOUS ONCE
Status: COMPLETED | OUTPATIENT
Start: 2024-05-14 | End: 2024-05-14

## 2024-05-14 RX ADMIN — GENTAMICIN 80 MG: 40 INJECTION, SOLUTION INTRAMUSCULAR; INTRAVENOUS at 13:08

## 2024-05-14 NOTE — PROGRESS NOTES
Chief Complaint:      Chief Complaint   Patient presents with    Blood in Urine     Cystoscopy        HPI:   Ms.Gloria Lizama presents to the clinic for follow-up for microscopic hematuria. She was initially seen in September 2023 and referred to us by Dr. Arellano for an appropriate workup. She had a CT scan of the abdomen pelvis completed at that time that showed no abnormalities of the kidneys, ureter, or bladder. She had no significant bladder wall thickening or masses noted. She was scheduled to undergo a cystoscopy with Dr. Unger in December 2023 however has canceled several appointments since. She returns today for further discussion of cystoscopy. She denies any significant gross hematuria but reports that she has persistent microscopic blood on urine analysis when tested. She did have a recent urine analysis on 3/28/2023 that showed 2+ blood, 1+ protein, and 3+ glucose. No signs of leukocytes or nitrites. On microscopic UA she had 6-10 red blood cells, 0-2 white blood cells, no bacteria seen. She also had a recent CMP completed around that time that showed stable chronic kidney disease stage IV with a GFR of roughly 28 and creatinine of roughly 1.96-2. She has had a negative cystoscopy completed by Dr. Vila in 2018.  Now presents for cystoscopy she is a heavy smoker.  Lower tract investigation was negative we will see her back on an as-needed basis.    Past Medical History:     Past Medical History:   Diagnosis Date    Anxiety     Arthritis     Back pain     Chronic kidney disease     COPD (chronic obstructive pulmonary disease)     Crohn's disease     Depression     Diabetes mellitus     H/O colonoscopy     Kidney disease        Current Meds:     Current Outpatient Medications   Medication Sig Dispense Refill    albuterol sulfate HFA (Ventolin HFA) 108 (90 Base) MCG/ACT inhaler Inhale 2 puffs Every 6 (Six) Hours As Needed for Wheezing. 18 g 11    amitriptyline (ELAVIL) 50 MG tablet       cefdinir  (OMNICEF) 300 MG capsule Take 1 capsule by mouth Daily. (Patient not taking: Reported on 5/2/2024) 3 capsule 0    Evolocumab (REPATHA) solution auto-injector SureClick injection Inject 1 mL under the skin into the appropriate area as directed.      Farxiga 10 MG tablet Take 10 mg by mouth Daily.      fenofibrate (TRICOR) 145 MG tablet Take 1 tablet by mouth Daily.      Fluticasone-Umeclidin-Vilant (Trelegy Ellipta) 100-62.5-25 MCG/ACT inhaler Inhale 1 puff Daily. 60 each 11    hydrOXYzine (ATARAX) 25 MG tablet       insulin glargine (LANTUS) 100 UNIT/ML injection Inject  under the skin daily.      NOVOLOG FLEXPEN 100 UNIT/ML solution pen-injector sc pen       nystatin (MYCOSTATIN) 004661 UNIT/GM powder Apply  topically to the appropriate area as directed 3 (Three) Times a Day. 60 g 1    oxyCODONE-acetaminophen (PERCOCET) 7.5-325 MG per tablet Take 1 tablet by mouth Every 6 (Six) Hours As Needed.      pantoprazole (PROTONIX) 20 MG EC tablet Take 1 tablet by mouth Daily. 30 tablet 5    PARoxetine (PAXIL) 20 MG tablet Take 1 tablet by mouth Daily. As directed.      pregabalin (LYRICA) 225 MG capsule Take 1 capsule by mouth 2 (Two) Times a Day.      rosuvastatin (CRESTOR) 40 MG tablet Take 1 tablet by mouth Daily. for cholesterol      Semaglutide,0.25 or 0.5MG/DOS, (Ozempic, 0.25 or 0.5 MG/DOSE,) 2 MG/1.5ML solution pen-injector Inject 0.5 mg under the skin into the appropriate area as directed Every 7 (Seven) Days.      tamsulosin (FLOMAX) 0.4 MG capsule 24 hr capsule Take 1 capsule by mouth Daily. (Patient not taking: Reported on 5/2/2024) 30 capsule 1     No current facility-administered medications for this visit.        Allergies:      No Known Allergies     Past Surgical History:     Past Surgical History:   Procedure Laterality Date    APPENDECTOMY      BREAST BIOPSY Right 2014    benign    BREAST BIOPSY Right 2001    benign    COLONOSCOPY  2016    COLONOSCOPY N/A 6/22/2016    Procedure: COLONOSCOPY;  Surgeon:  Junior Carver III, MD;  Location: [facility] OR;  Service:     COLONOSCOPY N/A 2/15/2018    Procedure: COLONOSCOPY  CPTCODE:56623;  Surgeon: Junior Carver III, MD;  Location: [facility] OR;  Service:     ENDOSCOPY N/A 6/22/2016    Procedure: ESOPHAGOGASTRODUODENOSCOPY W/ BIOPSY;  Surgeon: Junior Carver III, MD;  Location: [facility] OR;  Service:     ENDOSCOPY N/A 2/15/2018    Procedure: ESOPHAGOGASTRODUODENOSCOPY WITH BIOPSY  CPTCODE:21191;  Surgeon: Junior Carver III, MD;  Location: [facility] OR;  Service:     HYSTERECTOMY      NOSE SURGERY      TUBAL ABDOMINAL LIGATION      UPPER GASTROINTESTINAL ENDOSCOPY         Social History:     Social History     Socioeconomic History    Marital status:    Tobacco Use    Smoking status: Every Day     Current packs/day: 1.00     Average packs/day: 1 pack/day for 45.0 years (45.0 ttl pk-yrs)     Types: Cigarettes     Passive exposure: Current    Smokeless tobacco: Never    Tobacco comments:     Down to a half.   Vaping Use    Vaping status: Never Used   Substance and Sexual Activity    Alcohol use: No    Drug use: No    Sexual activity: Defer       Family History:     Family History   Problem Relation Age of Onset    Heart attack Mother         acute myocard infarction    Cancer Other         cancer and breast cancer    Diabetes Other     Heart disease Other     Multiple myeloma Other     Breast cancer Maternal Aunt     Breast cancer Maternal Aunt     Cancer Father     Multiple myeloma Father        Review of Systems:     Review of Systems   Constitutional: Negative.  Negative for activity change, appetite change, chills, diaphoresis, fatigue and unexpected weight change.   HENT:  Negative for congestion, dental problem, drooling, ear discharge, ear pain, facial swelling, hearing loss, mouth sores, nosebleeds, postnasal drip, rhinorrhea, sinus pressure, sneezing, sore throat, tinnitus, trouble swallowing and voice change.    Eyes: Negative.  Negative for  photophobia, pain, discharge, redness, itching and visual disturbance.   Respiratory: Negative.  Negative for apnea, cough, choking, chest tightness, shortness of breath, wheezing and stridor.    Cardiovascular: Negative.  Negative for chest pain, palpitations and leg swelling.   Gastrointestinal: Negative.  Negative for abdominal distention, abdominal pain, anal bleeding, blood in stool, constipation, diarrhea, nausea, rectal pain and vomiting.   Endocrine: Negative.  Negative for cold intolerance, heat intolerance, polydipsia, polyphagia and polyuria.   Musculoskeletal: Negative.  Negative for arthralgias, back pain, gait problem, joint swelling, myalgias, neck pain and neck stiffness.   Skin: Negative.  Negative for color change, pallor, rash and wound.   Allergic/Immunologic: Negative.  Negative for environmental allergies, food allergies and immunocompromised state.   Neurological: Negative.  Negative for dizziness, tremors, seizures, syncope, facial asymmetry, speech difficulty, weakness, light-headedness, numbness and headaches.   Hematological: Negative.  Negative for adenopathy. Does not bruise/bleed easily.   Psychiatric/Behavioral:  Negative for agitation, behavioral problems, confusion, decreased concentration, dysphoric mood, hallucinations, self-injury, sleep disturbance and suicidal ideas. The patient is not nervous/anxious and is not hyperactive.    All other systems reviewed and are negative.      Physical Exam:     Physical Exam  Constitutional:       Appearance: She is well-developed.   HENT:      Head: Normocephalic and atraumatic.      Right Ear: External ear normal.      Left Ear: External ear normal.   Eyes:      Conjunctiva/sclera: Conjunctivae normal.      Pupils: Pupils are equal, round, and reactive to light.   Cardiovascular:      Rate and Rhythm: Normal rate and regular rhythm.      Heart sounds: Normal heart sounds.   Pulmonary:      Effort: Pulmonary effort is normal.      Breath  sounds: Normal breath sounds.   Abdominal:      General: Bowel sounds are normal. There is no distension.      Palpations: Abdomen is soft. There is no mass.      Tenderness: There is no abdominal tenderness. There is no guarding or rebound.   Genitourinary:     General: Normal vulva.      Vagina: No vaginal discharge.   Musculoskeletal:         General: Normal range of motion.   Skin:     General: Skin is warm and dry.   Neurological:      Mental Status: She is alert.      Deep Tendon Reflexes: Reflexes are normal and symmetric.   Psychiatric:         Behavior: Behavior normal.         Thought Content: Thought content normal.         Judgment: Judgment normal.         I have reviewed the following portions of the patient's history: Allergies, current medications, past family history, past medical history, past social history, past surgical history, problem list, and ROS and confirm it is accurate.    Recent Image (CT and/or KUB):      CT Abdomen and Pelvis: No results found for this or any previous visit.       CT Stone Protocol: Results for orders placed during the hospital encounter of 09/18/23    CT Abdomen Pelvis Stone Protocol    Narrative  EXAM: CT ABDOMEN PELVIS STONE PROTOCOL-      TECHNIQUE: Multiple axial CT images were obtained from lung bases  through pubic symphysis WITHOUT administration of IV contrast.  Reformatted images in the coronal and/or sagittal plane(s) were  generated from the axial data set to facilitate diagnostic accuracy  and/or surgical planning.  Oral Contrast:NONE.    Radiation dose reduction techniques were utilized per ALARA protocol.  Automated exposure control was initiated through either or [employer] or  [employer] [facility] [employer] software packages by  protocol.  DOSE:    Clinical information R31.9; R31.9-Hematuria, unspecified    Comparison 08/14/2018    FINDINGS:    Lower thorax: 3 mm nodule in the right lung base.    Abdomen:    Liver: Homogeneous. No focal hepatic mass or ductal  dilatation.    Gallbladder: No dilation or stone identified.    Pancreas: Unremarkable. No mass or ductal dilatation.    Spleen: Homogeneous. No splenomegaly.    Adrenals: No mass.    Kidneys/ureters: No mass. No obstructive uropathy.  No evidence of  urolithiasis.    GI tract: Large stool burden. There is no evidence of appendicitis    MESENTERY: No free fluid, walled off fluid collections, mesenteric  stranding, or enlarged lymph nodes      Vasculature: Evidence of atherosclerotic vascular disease    Abdominal wall: No focal hernia or mass.      Bladder: No focal mass or significant wall thickening    Reproductive: Unremarkable as visualized    Bones: No acute bony abnormality.    Impression  1. No definitive source for the patient's symptoms identified on today's  exam.      2.Other findings as above              This report was finalized on 9/18/2023 2:35 PM by Dr. Mike Guadarrama MD.       KUB: No results found for this or any previous visit.       Labs (past 3 months):      Lab on 03/28/2024   Component Date Value Ref Range Status    Total Cholesterol 03/28/2024 119  0 - 200 mg/dL Final    Triglycerides 03/28/2024 223 (H)  0 - 150 mg/dL Final    HDL Cholesterol 03/28/2024 28 (L)  40 - 60 mg/dL Final    LDL Cholesterol  03/28/2024 55  0 - 100 mg/dL Final    VLDL Cholesterol 03/28/2024 36  5 - 40 mg/dL Final    LDL/HDL Ratio 03/28/2024 1.66   Final   Lab on 03/28/2024   Component Date Value Ref Range Status    Color, UA 03/28/2024 Yellow  Yellow, Straw Final    Appearance, UA 03/28/2024 Clear  Clear Final    pH, UA 03/28/2024 6.5  5.0 - 8.0 Final    Specific Gravity, UA 03/28/2024 1.014  1.005 - 1.030 Final    Glucose, UA 03/28/2024 >=1000 mg/dL (3+) (A)  Negative Final    Ketones, UA 03/28/2024 Negative  Negative Final    Bilirubin, UA 03/28/2024 Negative  Negative Final    Blood, UA 03/28/2024 Moderate (2+) (A)  Negative Final    Protein, UA 03/28/2024 30 mg/dL (1+) (A)  Negative Final    Leuk Esterase, UA  03/28/2024 Negative  Negative Final    Nitrite, UA 03/28/2024 Negative  Negative Final    Urobilinogen, UA 03/28/2024 0.2 E.U./dL  0.2 - 1.0 E.U./dL Final    Protein/Creatinine Ratio, Urine 03/28/2024 930.8 (H)  0.0 - 200.0 mg/G Crea Final    Creatinine, Urine 03/28/2024 44.8  mg/dL Final    Total Protein, Urine 03/28/2024 41.7  mg/dL Final    Glucose 03/28/2024 126 (H)  65 - 99 mg/dL Final    BUN 03/28/2024 16  8 - 23 mg/dL Final    Creatinine 03/28/2024 1.96 (H)  0.57 - 1.00 mg/dL Final    Sodium 03/28/2024 144  136 - 145 mmol/L Final    Potassium 03/28/2024 5.1  3.5 - 5.2 mmol/L Final    Chloride 03/28/2024 108 (H)  98 - 107 mmol/L Final    CO2 03/28/2024 26.0  22.0 - 29.0 mmol/L Final    Calcium 03/28/2024 9.3  8.6 - 10.5 mg/dL Final    Total Protein 03/28/2024 7.3  6.0 - 8.5 g/dL Final    Albumin 03/28/2024 4.2  3.5 - 5.2 g/dL Final    ALT (SGPT) 03/28/2024 29  1 - 33 U/L Final    AST (SGOT) 03/28/2024 21  1 - 32 U/L Final    Alkaline Phosphatase 03/28/2024 100  39 - 117 U/L Final    Total Bilirubin 03/28/2024 0.3  0.0 - 1.2 mg/dL Final    Globulin 03/28/2024 3.1  gm/dL Final    A/G Ratio 03/28/2024 1.4  g/dL Final    BUN/Creatinine Ratio 03/28/2024 8.2  7.0 - 25.0 Final    Anion Gap 03/28/2024 10.0  5.0 - 15.0 mmol/L Final    eGFR 03/28/2024 27.9 (L)  >60.0 mL/min/1.73 Final    Hemoglobin A1C 03/28/2024 6.30 (H)  4.80 - 5.60 % Final    TSH 03/28/2024 2.890  0.270 - 4.200 uIU/mL Final    Free T4 03/28/2024 0.73 (L)  0.93 - 1.70 ng/dL Final    Vitamin B-12 03/28/2024 244  211 - 946 pg/mL Final    WBC 03/28/2024 3.86  3.40 - 10.80 10*3/mm3 Final    RBC 03/28/2024 3.70 (L)  3.77 - 5.28 10*6/mm3 Final    Hemoglobin 03/28/2024 10.9 (L)  12.0 - 15.9 g/dL Final    Hematocrit 03/28/2024 34.4  34.0 - 46.6 % Final    MCV 03/28/2024 93.0  79.0 - 97.0 fL Final    MCH 03/28/2024 29.5  26.6 - 33.0 pg Final    MCHC 03/28/2024 31.7  31.5 - 35.7 g/dL Final    RDW 03/28/2024 13.0  12.3 - 15.4 % Final    RDW-SD 03/28/2024 44.0   37.0 - 54.0 fl Final    MPV 03/28/2024 11.6  6.0 - 12.0 fL Final    Platelets 03/28/2024 182  140 - 450 10*3/mm3 Final    Neutrophil % 03/28/2024 61.4  42.7 - 76.0 % Final    Lymphocyte % 03/28/2024 24.4  19.6 - 45.3 % Final    Monocyte % 03/28/2024 8.3  5.0 - 12.0 % Final    Eosinophil % 03/28/2024 4.4  0.3 - 6.2 % Final    Basophil % 03/28/2024 1.0  0.0 - 1.5 % Final    Immature Grans % 03/28/2024 0.5  0.0 - 0.5 % Final    Neutrophils, Absolute 03/28/2024 2.37  1.70 - 7.00 10*3/mm3 Final    Lymphocytes, Absolute 03/28/2024 0.94  0.70 - 3.10 10*3/mm3 Final    Monocytes, Absolute 03/28/2024 0.32  0.10 - 0.90 10*3/mm3 Final    Eosinophils, Absolute 03/28/2024 0.17  0.00 - 0.40 10*3/mm3 Final    Basophils, Absolute 03/28/2024 0.04  0.00 - 0.20 10*3/mm3 Final    Immature Grans, Absolute 03/28/2024 0.02  0.00 - 0.05 10*3/mm3 Final    nRBC 03/28/2024 0.0  0.0 - 0.2 /100 WBC Final    RBC, UA 03/28/2024 6-10 (A)  None Seen, 0-2 /HPF Final    WBC, UA 03/28/2024 0-2  None Seen, 0-2 /HPF Final    Bacteria, UA 03/28/2024 None Seen  None Seen /HPF Final    Squamous Epithelial Cells, UA 03/28/2024 0-2  None Seen, 0-2 /HPF Final    Hyaline Casts, UA 03/28/2024 None Seen  None Seen /LPF Final    Methodology 03/28/2024 Automated Microscopy   Final        Procedure:   Cystoscopy:  Patient presents today for cystourethroscopy.  I went ahead and obtained an informed consent including the risks of anesthesia, bleeding, infection, etc.  After prepping and draping in a sterile fashion in the low dorsal lithotomy position, the urethra was gently anesthetized with 10 mL of 2% viscous Xylocaine jelly.  After an appropriate period of topical anesthesia, I used the Olympus digital 14 [place] flexible cystoscope to examine the anterior urethra which was completely normal.  The ureteral orifices were visualized and normal in position and configuration. There were no stones, tumors, or foreign bodies.  The blue light was enabled and was negative  allowing us to see small mucosal lesions. The patient was given 80 mg of gentamicin in an intramuscular fashion as prophylaxis for the cystoscopy and released from the clinic.    Assessment/Plan:   Hematuria-patient was diagnosed with hematuria.  We discussed the significance of microscopic hematuria versus gross hematuria.  We discussed the presence or absence of the type of clotting identified including vermiform clots consistent with ureteral bleeding versus just pink-tinged urine versus seymour clots.  We discussed the presence of urokinase in the urine which causes the clots to dissolve with time.  We discussed the fact that it takes only a very small amount of blood in the urine to make the urine very red appearing and therefore give one the impression that there is much more blood loss that is really present.  I discussed the use of both an upper and lower tract investigation.  I discussed the fact that an upper tract investigation includes a normal renal ultrasound with a significant risk of missing more subtle lesions.  Progressing to a CT scan without contrast and finally the CT scan with contrast being the gold standard to diagnose the small neoplasms.  We discussed the lower tract investigation consisting of a cystoscopy in many of the cases where the upper tract study is negative.  Also discussed the fact that if there is a contraindication to the use of contrast we would do a noncontrasted study and this also has a chance of missing small lesions.  The specific instance would be cases of diabetes and chronic renal insufficiency.  Discussed the fact that there is about a 96% chance of a negative workup with episodes of microscopic hematuria and with much greater in the face of gross hematuria.  We discussed the fact that this is a non-cumulative test.  In other words, if there is hematuria next year I would recommend continuing to work up the condition because of the fact that neoplasms may be small at  the first workup and easily are missed.  I discussed the differential diagnosis of hematuria including trauma, neoplasia, infection, etc.  We discussed the fact that if there is any history of chronic kidney disease or risk factors such as diabetes for contrast a noncontrasted study will be utilized.  We will initiate an investigation.  Her workup thus far is negative  Tobacco abuse    Patient reports that she is not currently experiencing any symptoms of urinary incontinence.                     This document has been electronically signed by YADIEL VIGIL MD May 14, 2024 12:52 EDT    Dictated Utilizing Dragon Dictation: Part of this note may be an electronic transcription/translation of spoken language to printed text using the Dragon Dictation System.

## 2024-05-20 ENCOUNTER — OFFICE VISIT (OUTPATIENT)
Dept: PULMONOLOGY | Facility: CLINIC | Age: 64
End: 2024-05-20
Payer: MEDICARE

## 2024-05-20 VITALS
BODY MASS INDEX: 29.19 KG/M2 | HEIGHT: 67 IN | TEMPERATURE: 97 F | DIASTOLIC BLOOD PRESSURE: 76 MMHG | SYSTOLIC BLOOD PRESSURE: 140 MMHG | HEART RATE: 99 BPM | WEIGHT: 186 LBS | OXYGEN SATURATION: 98 %

## 2024-05-20 DIAGNOSIS — R91.1 PULMONARY NODULE: Primary | ICD-10-CM

## 2024-05-20 DIAGNOSIS — E66.3 OVERWEIGHT: ICD-10-CM

## 2024-05-20 DIAGNOSIS — R09.02 HYPOXIA: ICD-10-CM

## 2024-05-20 DIAGNOSIS — F17.210 CIGARETTE NICOTINE DEPENDENCE WITHOUT COMPLICATION: ICD-10-CM

## 2024-05-20 DIAGNOSIS — J44.9 CHRONIC OBSTRUCTIVE PULMONARY DISEASE, UNSPECIFIED COPD TYPE: ICD-10-CM

## 2024-05-20 PROCEDURE — 99214 OFFICE O/P EST MOD 30 MIN: CPT | Performed by: NURSE PRACTITIONER

## 2024-05-20 PROCEDURE — 1160F RVW MEDS BY RX/DR IN RCRD: CPT | Performed by: NURSE PRACTITIONER

## 2024-05-20 PROCEDURE — 1159F MED LIST DOCD IN RCRD: CPT | Performed by: NURSE PRACTITIONER

## 2024-05-20 NOTE — PROGRESS NOTES
"Chief Complaint  Multiple pulmonary nodules and COPD (Couldn't tell a difference using Trelegy, would like to try Breztri)    Elliot Lizama presents to [facility]  History of Present Illness    Ms. Lizama is a 63 year old female with a medical history significant for anxiety, arthritis, COPD, CKD, depression, and diabetes.    She presents today for follow upon pulmonary nodules and COPD.  She states that overall she has been doing well.  She reports that she was taking Trelegy but reports that it was not helping.  She is aso using albuterol as needed.  She is complaint with using supplemental oxygen at 2 L.  She is a current smoker.        Objective   Vital Signs:  /76   Pulse 99   Temp 97 °F (36.1 °C)   Ht 170.2 cm (67\")   Wt 84.4 kg (186 lb)   SpO2 98% Comment: 2L POC  BMI 29.13 kg/m²   Estimated body mass index is 29.13 kg/m² as calculated from the following:    Height as of this encounter: 170.2 cm (67\").    Weight as of this encounter: 84.4 kg (186 lb).               Physical Exam   GENERAL APPEARANCE: Well developed, well nourished, alert and cooperative, and appears to be in no acute distress.    HEAD: normocephalic. Atraumatic.    EYES: PERRL, EOMI. Vision is grossly intact.    THROAT: Oral cavity and pharynx normal. No inflammation, swelling, exudate, or lesions.     NECK: Neck supple.  No thyromegaly.    CARDIAC: Normal S1 and S2. No S3, S4 or murmurs. Rhythm is regular.     RESPIRATORY:Bilateral air entry positive. Bilateral diminished breath sounds. No wheezing, crackles or rhonchi noted.    GI: Positive bowel sounds. Soft, nondistended, nontender.     MUSCULOSKELETAL: No significant deformity or joint abnormality. No edema. Peripheral pulses intact. No varicosities.    NEUROLOGICAL: Strength and sensation symmetric and intact throughout.     PSYCHIATRIC: The mental examination revealed the patient was oriented to " person, place, and time.       Result Review :    The following data was reviewed by: RAMYA Olvera on 05/20/2024:  Common labs          9/8/2023    14:22 12/20/2023    15:50 3/28/2024    14:53   Common Labs   Glucose 178  247  126    BUN 22  20  16    Creatinine 2.19  1.86  1.96    Sodium 137  139  144    Potassium 4.5  4.6  5.1    Chloride 102  102  108    Calcium 9.4  9.1  9.3    Albumin 4.3  4.4  4.2    Total Bilirubin 0.5  0.4  0.3    Alkaline Phosphatase 56  104  100    AST (SGOT) 26  23  21    ALT (SGPT) 18  25  29    WBC   3.86    Hemoglobin   10.9    Hematocrit   34.4    Platelets   182    Total Cholesterol   119    Triglycerides   223    HDL Cholesterol   28    LDL Cholesterol    55    Hemoglobin A1C   6.30                   Assessment and Plan     Diagnoses and all orders for this visit:    1. Pulmonary nodule (Primary)  -     CT Chest Without Contrast; Future    2. Chronic obstructive pulmonary disease, unspecified COPD type    3. Overweight    4. Cigarette nicotine dependence without complication    5. Hypoxia    Other orders  -     Budeson-Glycopyrrol-Formoterol (BREZTRI) 160-9-4.8 MCG/ACT aerosol inhaler; Inhale 2 puffs 2 (Two) Times a Day.  Dispense: 10.7 g; Refill: 5        We will schedule her for a CT chest for follow up on pulmonary nodule.    Will try her on Breztri BID.  Continue albuterol as needed.    She is complaint with supplemental oxygen at 2 L.    Nasrin Lizama  reports that she has been smoking cigarettes. She has a 45 pack-year smoking history. She has been exposed to tobacco smoke. She has never used smokeless tobacco. I have educated her on the risk of diseases from using tobacco products such as cancer and COPD.     I advised her to quit and she is not willing to quit.               Follow Up     Return in about 6 months (around 11/20/2024).  Patient was given instructions and counseling regarding her condition or for health maintenance advice. Please see specific  information pulled into the AVS if appropriate.

## 2024-05-30 ENCOUNTER — TRANSCRIBE ORDERS (OUTPATIENT)
Dept: ADMINISTRATIVE | Facility: HOSPITAL | Age: 64
End: 2024-05-30
Payer: MEDICARE

## 2024-05-30 ENCOUNTER — HOSPITAL ENCOUNTER (OUTPATIENT)
Dept: CT IMAGING | Facility: HOSPITAL | Age: 64
Discharge: HOME | End: 2024-05-30
Payer: MEDICARE

## 2024-05-30 ENCOUNTER — LAB (OUTPATIENT)
Dept: LAB | Facility: HOSPITAL | Age: 64
End: 2024-05-30
Payer: MEDICARE

## 2024-05-30 DIAGNOSIS — N18.32 STAGE 3B CHRONIC KIDNEY DISEASE: Primary | ICD-10-CM

## 2024-05-30 DIAGNOSIS — R91.1 PULMONARY NODULE: ICD-10-CM

## 2024-05-30 DIAGNOSIS — N18.32 CHRONIC KIDNEY DISEASE, STAGE 3B: ICD-10-CM

## 2024-05-30 DIAGNOSIS — N18.32 CHRONIC KIDNEY DISEASE (CKD) STAGE G3B/A1, MODERATELY DECREASED GLOMERULAR FILTRATION RATE (GFR) BETWEEN 30-44 ML/MIN/1.73 SQUARE METER AND ALBUMINURIA CREATININE RATIO LESS THAN 30 MG/G (CMS/H*: ICD-10-CM

## 2024-05-30 DIAGNOSIS — E55.9 VITAMIN D DEFICIENCY: ICD-10-CM

## 2024-05-30 DIAGNOSIS — N18.32 STAGE 3B CHRONIC KIDNEY DISEASE: ICD-10-CM

## 2024-05-30 PROCEDURE — 81001 URINALYSIS AUTO W/SCOPE: CPT

## 2024-05-30 PROCEDURE — 80053 COMPREHEN METABOLIC PANEL: CPT

## 2024-05-30 PROCEDURE — 82570 ASSAY OF URINE CREATININE: CPT

## 2024-05-30 PROCEDURE — 71250 CT THORAX DX C-: CPT

## 2024-05-30 PROCEDURE — 82306 VITAMIN D 25 HYDROXY: CPT

## 2024-05-30 PROCEDURE — 84156 ASSAY OF PROTEIN URINE: CPT

## 2024-05-30 PROCEDURE — 36415 COLL VENOUS BLD VENIPUNCTURE: CPT

## 2024-05-31 LAB
25(OH)D3 SERPL-MCNC: 20.9 NG/ML (ref 30–100)
ALBUMIN SERPL-MCNC: 4.4 G/DL (ref 3.5–5.2)
ALBUMIN/GLOB SERPL: 1.5 G/DL
ALP SERPL-CCNC: 99 U/L (ref 39–117)
ALT SERPL W P-5'-P-CCNC: 19 U/L (ref 1–33)
ANION GAP SERPL CALCULATED.3IONS-SCNC: 12.5 MMOL/L (ref 5–15)
AST SERPL-CCNC: 20 U/L (ref 1–32)
BACTERIA UR QL AUTO: ABNORMAL /HPF
BILIRUB SERPL-MCNC: 0.4 MG/DL (ref 0–1.2)
BILIRUB UR QL STRIP: NEGATIVE
BUN SERPL-MCNC: 19 MG/DL (ref 8–23)
BUN/CREAT SERPL: 9 (ref 7–25)
CALCIUM SPEC-SCNC: 9.5 MG/DL (ref 8.6–10.5)
CHLORIDE SERPL-SCNC: 101 MMOL/L (ref 98–107)
CLARITY UR: CLEAR
CO2 SERPL-SCNC: 23.5 MMOL/L (ref 22–29)
COLOR UR: YELLOW
CREAT SERPL-MCNC: 2.12 MG/DL (ref 0.57–1)
CREAT UR-MCNC: 43.2 MG/DL
EGFRCR SERPLBLD CKD-EPI 2021: 25.4 ML/MIN/1.73
GLOBULIN UR ELPH-MCNC: 2.9 GM/DL
GLUCOSE SERPL-MCNC: 211 MG/DL (ref 65–99)
GLUCOSE UR STRIP-MCNC: ABNORMAL MG/DL
HGB UR QL STRIP.AUTO: ABNORMAL
HOLD SPECIMEN: NORMAL
HYALINE CASTS UR QL AUTO: ABNORMAL /LPF
KETONES UR QL STRIP: NEGATIVE
LEUKOCYTE ESTERASE UR QL STRIP.AUTO: NEGATIVE
NITRITE UR QL STRIP: NEGATIVE
PH UR STRIP.AUTO: 6.5 [PH] (ref 5–8)
POTASSIUM SERPL-SCNC: 5 MMOL/L (ref 3.5–5.2)
PROT ?TM UR-MCNC: 51.1 MG/DL
PROT SERPL-MCNC: 7.3 G/DL (ref 6–8.5)
PROT UR QL STRIP: ABNORMAL
PROT/CREAT UR: 1182.9 MG/G CREA (ref 0–200)
RBC # UR STRIP: ABNORMAL /HPF
REF LAB TEST METHOD: ABNORMAL
SODIUM SERPL-SCNC: 137 MMOL/L (ref 136–145)
SP GR UR STRIP: 1.02 (ref 1–1.03)
SQUAMOUS #/AREA URNS HPF: ABNORMAL /HPF
UROBILINOGEN UR QL STRIP: ABNORMAL
WBC # UR STRIP: ABNORMAL /HPF

## 2024-06-04 DIAGNOSIS — R91.1 PULMONARY NODULE: Primary | ICD-10-CM

## 2024-06-04 NOTE — PROGRESS NOTES
CT Chest was reviewed and discussed with the patient.  7 mm pulmonary nodule is noted to be stable.  We will plan to repeat in 6 months.

## 2024-09-05 ENCOUNTER — LAB (OUTPATIENT)
Dept: LAB | Facility: HOSPITAL | Age: 64
End: 2024-09-05
Payer: MEDICARE

## 2024-09-05 ENCOUNTER — TRANSCRIBE ORDERS (OUTPATIENT)
Dept: ADMINISTRATIVE | Facility: HOSPITAL | Age: 64
End: 2024-09-05
Payer: MEDICARE

## 2024-09-05 DIAGNOSIS — E83.52 HYPERCALCEMIA: ICD-10-CM

## 2024-09-05 DIAGNOSIS — N18.32 STAGE 3B CHRONIC KIDNEY DISEASE: Primary | ICD-10-CM

## 2024-09-05 DIAGNOSIS — N18.32 STAGE 3B CHRONIC KIDNEY DISEASE: ICD-10-CM

## 2024-09-05 PROCEDURE — 82570 ASSAY OF URINE CREATININE: CPT

## 2024-09-05 PROCEDURE — 80053 COMPREHEN METABOLIC PANEL: CPT

## 2024-09-05 PROCEDURE — 36415 COLL VENOUS BLD VENIPUNCTURE: CPT

## 2024-09-05 PROCEDURE — 87086 URINE CULTURE/COLONY COUNT: CPT

## 2024-09-05 PROCEDURE — 84156 ASSAY OF PROTEIN URINE: CPT

## 2024-09-05 PROCEDURE — 82306 VITAMIN D 25 HYDROXY: CPT

## 2024-09-05 PROCEDURE — 83970 ASSAY OF PARATHORMONE: CPT

## 2024-09-05 PROCEDURE — 81001 URINALYSIS AUTO W/SCOPE: CPT

## 2024-09-06 LAB
25(OH)D3 SERPL-MCNC: 16.1 NG/ML (ref 30–100)
ALBUMIN SERPL-MCNC: 4.2 G/DL (ref 3.5–5.2)
ALBUMIN/GLOB SERPL: 1.5 G/DL
ALP SERPL-CCNC: 76 U/L (ref 39–117)
ALT SERPL W P-5'-P-CCNC: 18 U/L (ref 1–33)
ANION GAP SERPL CALCULATED.3IONS-SCNC: 9 MMOL/L (ref 5–15)
AST SERPL-CCNC: 19 U/L (ref 1–32)
BACTERIA UR QL AUTO: ABNORMAL /HPF
BILIRUB SERPL-MCNC: 0.3 MG/DL (ref 0–1.2)
BILIRUB UR QL STRIP: NEGATIVE
BUN SERPL-MCNC: 22 MG/DL (ref 8–23)
BUN/CREAT SERPL: 10.1 (ref 7–25)
CALCIUM SPEC-SCNC: 9.4 MG/DL (ref 8.6–10.5)
CHLORIDE SERPL-SCNC: 102 MMOL/L (ref 98–107)
CLARITY UR: CLEAR
CO2 SERPL-SCNC: 25 MMOL/L (ref 22–29)
COLOR UR: YELLOW
CREAT SERPL-MCNC: 2.17 MG/DL (ref 0.57–1)
CREAT UR-MCNC: 38.9 MG/DL
EGFRCR SERPLBLD CKD-EPI 2021: 24.6 ML/MIN/1.73
GLOBULIN UR ELPH-MCNC: 2.8 GM/DL
GLUCOSE SERPL-MCNC: 162 MG/DL (ref 65–99)
GLUCOSE UR STRIP-MCNC: ABNORMAL MG/DL
HGB UR QL STRIP.AUTO: ABNORMAL
HOLD SPECIMEN: NORMAL
HYALINE CASTS UR QL AUTO: ABNORMAL /LPF
KETONES UR QL STRIP: NEGATIVE
LEUKOCYTE ESTERASE UR QL STRIP.AUTO: ABNORMAL
NITRITE UR QL STRIP: NEGATIVE
PH UR STRIP.AUTO: 6 [PH] (ref 5–8)
POTASSIUM SERPL-SCNC: 4.7 MMOL/L (ref 3.5–5.2)
PROT ?TM UR-MCNC: 40.7 MG/DL
PROT SERPL-MCNC: 7 G/DL (ref 6–8.5)
PROT UR QL STRIP: ABNORMAL
PROT/CREAT UR: 1046.3 MG/G CREA (ref 0–200)
PTH-INTACT SERPL-MCNC: 101 PG/ML (ref 15–65)
RBC # UR STRIP: ABNORMAL /HPF
REF LAB TEST METHOD: ABNORMAL
SODIUM SERPL-SCNC: 136 MMOL/L (ref 136–145)
SP GR UR STRIP: 1.01 (ref 1–1.03)
SQUAMOUS #/AREA URNS HPF: ABNORMAL /HPF
UROBILINOGEN UR QL STRIP: ABNORMAL
WBC # UR STRIP: ABNORMAL /HPF

## 2024-09-07 LAB — BACTERIA SPEC AEROBE CULT: NORMAL

## 2024-11-05 ENCOUNTER — LAB (OUTPATIENT)
Dept: LAB | Facility: HOSPITAL | Age: 64
End: 2024-11-05
Payer: MEDICARE

## 2024-11-05 ENCOUNTER — TRANSCRIBE ORDERS (OUTPATIENT)
Dept: ADMINISTRATIVE | Facility: HOSPITAL | Age: 64
End: 2024-11-05
Payer: MEDICARE

## 2024-11-05 DIAGNOSIS — R31.9 HEMATURIA, UNSPECIFIED TYPE: ICD-10-CM

## 2024-11-05 DIAGNOSIS — Z79.4 ENCOUNTER FOR LONG-TERM (CURRENT) USE OF INSULIN: ICD-10-CM

## 2024-11-05 DIAGNOSIS — N18.32 STAGE 3B CHRONIC KIDNEY DISEASE: ICD-10-CM

## 2024-11-05 DIAGNOSIS — E11.9 DIABETES MELLITUS WITHOUT COMPLICATION: ICD-10-CM

## 2024-11-05 DIAGNOSIS — E83.52 HYPERCALCEMIA: ICD-10-CM

## 2024-11-05 DIAGNOSIS — E78.2 MIXED HYPERLIPIDEMIA: ICD-10-CM

## 2024-11-05 DIAGNOSIS — I10 ESSENTIAL HYPERTENSION, MALIGNANT: ICD-10-CM

## 2024-11-05 DIAGNOSIS — E55.9 AVITAMINOSIS D: Primary | ICD-10-CM

## 2024-11-05 DIAGNOSIS — E11.9 DIABETES MELLITUS WITHOUT COMPLICATION: Primary | ICD-10-CM

## 2024-11-05 DIAGNOSIS — E55.9 AVITAMINOSIS D: ICD-10-CM

## 2024-11-05 PROCEDURE — 84443 ASSAY THYROID STIM HORMONE: CPT

## 2024-11-05 PROCEDURE — 82570 ASSAY OF URINE CREATININE: CPT

## 2024-11-05 PROCEDURE — 82306 VITAMIN D 25 HYDROXY: CPT

## 2024-11-05 PROCEDURE — 87147 CULTURE TYPE IMMUNOLOGIC: CPT

## 2024-11-05 PROCEDURE — 80061 LIPID PANEL: CPT

## 2024-11-05 PROCEDURE — 84156 ASSAY OF PROTEIN URINE: CPT

## 2024-11-05 PROCEDURE — 81001 URINALYSIS AUTO W/SCOPE: CPT

## 2024-11-05 PROCEDURE — 83970 ASSAY OF PARATHORMONE: CPT

## 2024-11-05 PROCEDURE — 80053 COMPREHEN METABOLIC PANEL: CPT

## 2024-11-05 PROCEDURE — 83036 HEMOGLOBIN GLYCOSYLATED A1C: CPT

## 2024-11-05 PROCEDURE — 84439 ASSAY OF FREE THYROXINE: CPT

## 2024-11-05 PROCEDURE — 36415 COLL VENOUS BLD VENIPUNCTURE: CPT

## 2024-11-05 PROCEDURE — 87086 URINE CULTURE/COLONY COUNT: CPT

## 2024-11-06 LAB
25(OH)D3 SERPL-MCNC: 23.2 NG/ML (ref 30–100)
ALBUMIN SERPL-MCNC: 3.9 G/DL (ref 3.5–5.2)
ALBUMIN/GLOB SERPL: 1.1 G/DL
ALP SERPL-CCNC: 53 U/L (ref 39–117)
ALT SERPL W P-5'-P-CCNC: 17 U/L (ref 1–33)
ANION GAP SERPL CALCULATED.3IONS-SCNC: 8.2 MMOL/L (ref 5–15)
AST SERPL-CCNC: 24 U/L (ref 1–32)
BACTERIA UR QL AUTO: ABNORMAL /HPF
BILIRUB SERPL-MCNC: 0.3 MG/DL (ref 0–1.2)
BILIRUB UR QL STRIP: NEGATIVE
BUN SERPL-MCNC: 21 MG/DL (ref 8–23)
BUN/CREAT SERPL: 9.4 (ref 7–25)
CALCIUM SPEC-SCNC: 9.4 MG/DL (ref 8.6–10.5)
CHLORIDE SERPL-SCNC: 104 MMOL/L (ref 98–107)
CHOLEST SERPL-MCNC: 141 MG/DL (ref 0–200)
CLARITY UR: CLEAR
CO2 SERPL-SCNC: 24.8 MMOL/L (ref 22–29)
COLOR UR: YELLOW
CREAT SERPL-MCNC: 2.23 MG/DL (ref 0.57–1)
CREAT UR-MCNC: 48.6 MG/DL
EGFRCR SERPLBLD CKD-EPI 2021: 23.8 ML/MIN/1.73
GLOBULIN UR ELPH-MCNC: 3.5 GM/DL
GLUCOSE SERPL-MCNC: 131 MG/DL (ref 65–99)
GLUCOSE UR STRIP-MCNC: ABNORMAL MG/DL
HBA1C MFR BLD: 6.2 % (ref 4.8–5.6)
HDLC SERPL-MCNC: 22 MG/DL (ref 40–60)
HGB UR QL STRIP.AUTO: ABNORMAL
HOLD SPECIMEN: NORMAL
HYALINE CASTS UR QL AUTO: ABNORMAL /LPF
KETONES UR QL STRIP: NEGATIVE
LDLC SERPL CALC-MCNC: 73 MG/DL (ref 0–100)
LDLC/HDLC SERPL: 2.82 {RATIO}
LEUKOCYTE ESTERASE UR QL STRIP.AUTO: ABNORMAL
NITRITE UR QL STRIP: NEGATIVE
PH UR STRIP.AUTO: 6.5 [PH] (ref 5–8)
POTASSIUM SERPL-SCNC: 4.7 MMOL/L (ref 3.5–5.2)
PROT ?TM UR-MCNC: 43.5 MG/DL
PROT SERPL-MCNC: 7.4 G/DL (ref 6–8.5)
PROT UR QL STRIP: ABNORMAL
PROT/CREAT UR: 895.1 MG/G CREA (ref 0–200)
PTH-INTACT SERPL-MCNC: 105 PG/ML (ref 15–65)
RBC # UR STRIP: ABNORMAL /HPF
REF LAB TEST METHOD: ABNORMAL
SODIUM SERPL-SCNC: 137 MMOL/L (ref 136–145)
SP GR UR STRIP: 1.01 (ref 1–1.03)
SQUAMOUS #/AREA URNS HPF: ABNORMAL /HPF
T4 FREE SERPL-MCNC: 0.76 NG/DL (ref 0.92–1.68)
TRIGL SERPL-MCNC: 285 MG/DL (ref 0–150)
TSH SERPL DL<=0.05 MIU/L-ACNC: 3.5 UIU/ML (ref 0.27–4.2)
UROBILINOGEN UR QL STRIP: ABNORMAL
VLDLC SERPL-MCNC: 46 MG/DL (ref 5–40)
WBC # UR STRIP: ABNORMAL /HPF

## 2024-11-07 LAB — BACTERIA SPEC AEROBE CULT: ABNORMAL

## 2024-11-14 RX ORDER — BUDESONIDE, GLYCOPYRROLATE, AND FORMOTEROL FUMARATE 160; 9; 4.8 UG/1; UG/1; UG/1
2 AEROSOL, METERED RESPIRATORY (INHALATION) 2 TIMES DAILY
Qty: 10.7 G | Refills: 5 | Status: SHIPPED | OUTPATIENT
Start: 2024-11-14

## 2024-12-06 ENCOUNTER — HOSPITAL ENCOUNTER (OUTPATIENT)
Dept: CT IMAGING | Facility: HOSPITAL | Age: 64
Discharge: HOME | End: 2024-12-06
Payer: MEDICARE

## 2024-12-06 ENCOUNTER — LAB (OUTPATIENT)
Dept: LAB | Facility: HOSPITAL | Age: 64
End: 2024-12-06
Payer: MEDICARE

## 2024-12-06 ENCOUNTER — TRANSCRIBE ORDERS (OUTPATIENT)
Dept: ADMINISTRATIVE | Facility: HOSPITAL | Age: 64
End: 2024-12-06
Payer: MEDICARE

## 2024-12-06 DIAGNOSIS — N39.0 URINARY TRACT INFECTION WITHOUT HEMATURIA, SITE UNSPECIFIED: ICD-10-CM

## 2024-12-06 DIAGNOSIS — R91.1 PULMONARY NODULE: ICD-10-CM

## 2024-12-06 DIAGNOSIS — N18.32 STAGE 3B CHRONIC KIDNEY DISEASE: ICD-10-CM

## 2024-12-06 DIAGNOSIS — N18.32 STAGE 3B CHRONIC KIDNEY DISEASE: Primary | ICD-10-CM

## 2024-12-06 PROCEDURE — 71250 CT THORAX DX C-: CPT

## 2024-12-09 ENCOUNTER — LAB (OUTPATIENT)
Dept: LAB | Facility: HOSPITAL | Age: 64
End: 2024-12-09
Payer: MEDICARE

## 2024-12-09 LAB — HOLD SPECIMEN: NORMAL

## 2024-12-09 PROCEDURE — 81001 URINALYSIS AUTO W/SCOPE: CPT

## 2024-12-09 PROCEDURE — 87086 URINE CULTURE/COLONY COUNT: CPT

## 2024-12-10 LAB
BACTERIA UR QL AUTO: NORMAL /HPF
BILIRUB UR QL STRIP: NEGATIVE
CLARITY UR: CLEAR
COLOR UR: YELLOW
GLUCOSE UR STRIP-MCNC: ABNORMAL MG/DL
HGB UR QL STRIP.AUTO: ABNORMAL
HYALINE CASTS UR QL AUTO: NORMAL /LPF
KETONES UR QL STRIP: NEGATIVE
LEUKOCYTE ESTERASE UR QL STRIP.AUTO: NEGATIVE
NITRITE UR QL STRIP: NEGATIVE
PH UR STRIP.AUTO: 6.5 [PH] (ref 5–8)
PROT UR QL STRIP: ABNORMAL
RBC # UR STRIP: NORMAL /HPF
REF LAB TEST METHOD: NORMAL
SP GR UR STRIP: 1.01 (ref 1–1.03)
SQUAMOUS #/AREA URNS HPF: NORMAL /HPF
UROBILINOGEN UR QL STRIP: ABNORMAL
WBC # UR STRIP: NORMAL /HPF

## 2024-12-11 LAB — BACTERIA SPEC AEROBE CULT: NO GROWTH

## 2024-12-13 ENCOUNTER — DOCUMENTATION (OUTPATIENT)
Dept: PULMONOLOGY | Facility: CLINIC | Age: 64
End: 2024-12-13
Payer: MEDICARE

## 2024-12-13 NOTE — PROGRESS NOTES
Attempted to call patient in regards to CT Chest results. No answer, left message for call back.  Pulmonary nodule is noted to be stable.  We will discuss further follow up at her next visit.

## 2024-12-13 NOTE — LETTER
Nasrin Lizama  [address]  [place] [address]    December 13, 2024     Dear Ms. Lizama:    Below are the results from your recent visit:    CT Chest was reviewed.  Previously noted pulmonary nodules are noted to be stable.  We will discuss follow up at your next visit.      If you have any questions or concerns, please don't hesitate to call.         Sincerely,        RAMYA Olvera

## 2024-12-18 ENCOUNTER — TELEPHONE (OUTPATIENT)
Dept: PULMONOLOGY | Facility: CLINIC | Age: 64
End: 2024-12-18
Payer: MEDICARE

## 2024-12-18 NOTE — TELEPHONE ENCOUNTER
Patient returned call about test results:    Pulmonary nodule is noted to be stable. We will discuss further follow up at her next visit.     Per Claudia Ambrocio    Patient noted she understood and would see us at her next visit.

## 2025-01-03 ENCOUNTER — TRANSCRIBE ORDERS (OUTPATIENT)
Dept: ADMINISTRATIVE | Facility: HOSPITAL | Age: 65
End: 2025-01-03
Payer: MEDICARE

## 2025-01-03 ENCOUNTER — LAB (OUTPATIENT)
Dept: LAB | Facility: HOSPITAL | Age: 65
End: 2025-01-03
Payer: MEDICAID

## 2025-01-03 DIAGNOSIS — N39.0 UTI (URINARY TRACT INFECTION), UNCOMPLICATED: ICD-10-CM

## 2025-01-03 DIAGNOSIS — N18.32 STAGE 3B CHRONIC KIDNEY DISEASE: Primary | ICD-10-CM

## 2025-01-03 DIAGNOSIS — N18.32 STAGE 3B CHRONIC KIDNEY DISEASE: ICD-10-CM

## 2025-01-03 DIAGNOSIS — E83.52 HYPERCALCEMIA: ICD-10-CM

## 2025-01-03 LAB
ALBUMIN SERPL-MCNC: 4.2 G/DL (ref 3.5–5.2)
ALBUMIN/GLOB SERPL: 1.2 G/DL
ALP SERPL-CCNC: 59 U/L (ref 39–117)
ALT SERPL W P-5'-P-CCNC: 20 U/L (ref 1–33)
ANION GAP SERPL CALCULATED.3IONS-SCNC: 13.1 MMOL/L (ref 5–15)
AST SERPL-CCNC: 23 U/L (ref 1–32)
BILIRUB SERPL-MCNC: 0.4 MG/DL (ref 0–1.2)
BUN SERPL-MCNC: 23 MG/DL (ref 8–23)
BUN/CREAT SERPL: 10.5 (ref 7–25)
CALCIUM SPEC-SCNC: 9.5 MG/DL (ref 8.6–10.5)
CHLORIDE SERPL-SCNC: 102 MMOL/L (ref 98–107)
CO2 SERPL-SCNC: 22.9 MMOL/L (ref 22–29)
CREAT SERPL-MCNC: 2.19 MG/DL (ref 0.57–1)
EGFRCR SERPLBLD CKD-EPI 2021: 24.3 ML/MIN/1.73
GLOBULIN UR ELPH-MCNC: 3.4 GM/DL
GLUCOSE SERPL-MCNC: 280 MG/DL (ref 65–99)
POTASSIUM SERPL-SCNC: 4.9 MMOL/L (ref 3.5–5.2)
PROT SERPL-MCNC: 7.6 G/DL (ref 6–8.5)
PTH-INTACT SERPL-MCNC: 128.5 PG/ML (ref 15–65)
SODIUM SERPL-SCNC: 138 MMOL/L (ref 136–145)

## 2025-01-03 PROCEDURE — 82306 VITAMIN D 25 HYDROXY: CPT

## 2025-01-03 PROCEDURE — 84156 ASSAY OF PROTEIN URINE: CPT

## 2025-01-03 PROCEDURE — 81001 URINALYSIS AUTO W/SCOPE: CPT

## 2025-01-03 PROCEDURE — 87086 URINE CULTURE/COLONY COUNT: CPT

## 2025-01-03 PROCEDURE — 82570 ASSAY OF URINE CREATININE: CPT

## 2025-01-03 PROCEDURE — 87147 CULTURE TYPE IMMUNOLOGIC: CPT

## 2025-01-03 PROCEDURE — 83970 ASSAY OF PARATHORMONE: CPT

## 2025-01-03 PROCEDURE — 80053 COMPREHEN METABOLIC PANEL: CPT

## 2025-01-03 PROCEDURE — 36415 COLL VENOUS BLD VENIPUNCTURE: CPT

## 2025-01-04 LAB
25(OH)D3 SERPL-MCNC: 20.2 NG/ML (ref 30–100)
BACTERIA SPEC AEROBE CULT: ABNORMAL
BACTERIA UR QL AUTO: ABNORMAL /HPF
BILIRUB UR QL STRIP: NEGATIVE
CLARITY UR: CLEAR
COLOR UR: YELLOW
CREAT UR-MCNC: 52.2 MG/DL
GLUCOSE UR STRIP-MCNC: ABNORMAL MG/DL
HGB UR QL STRIP.AUTO: ABNORMAL
HYALINE CASTS UR QL AUTO: ABNORMAL /LPF
KETONES UR QL STRIP: NEGATIVE
LEUKOCYTE ESTERASE UR QL STRIP.AUTO: NEGATIVE
NITRITE UR QL STRIP: NEGATIVE
PH UR STRIP.AUTO: 7 [PH] (ref 5–8)
PROT ?TM UR-MCNC: 48 MG/DL
PROT UR QL STRIP: ABNORMAL
PROT/CREAT UR: 919.5 MG/G CREA (ref 0–200)
RBC # UR STRIP: ABNORMAL /HPF
REF LAB TEST METHOD: ABNORMAL
SP GR UR STRIP: 1.02 (ref 1–1.03)
SQUAMOUS #/AREA URNS HPF: ABNORMAL /HPF
UROBILINOGEN UR QL STRIP: ABNORMAL
WBC # UR STRIP: ABNORMAL /HPF

## 2025-04-21 ENCOUNTER — TRANSCRIBE ORDERS (OUTPATIENT)
Dept: ADMINISTRATIVE | Facility: HOSPITAL | Age: 65
End: 2025-04-21
Payer: MEDICARE

## 2025-04-21 ENCOUNTER — LAB (OUTPATIENT)
Dept: LAB | Facility: HOSPITAL | Age: 65
End: 2025-04-21
Payer: MEDICARE

## 2025-04-21 DIAGNOSIS — N18.4 CHRONIC KIDNEY DISEASE, STAGE IV (SEVERE): ICD-10-CM

## 2025-04-21 DIAGNOSIS — N18.4 CHRONIC KIDNEY DISEASE, STAGE IV (SEVERE): Primary | ICD-10-CM

## 2025-04-21 DIAGNOSIS — E83.52 HYPERCALCEMIA: ICD-10-CM

## 2025-04-21 LAB
ALBUMIN SERPL-MCNC: 4.4 G/DL (ref 3.5–5.2)
ALBUMIN/GLOB SERPL: 1.4 G/DL
ALP SERPL-CCNC: 58 U/L (ref 39–117)
ALT SERPL W P-5'-P-CCNC: 24 U/L (ref 1–33)
ANION GAP SERPL CALCULATED.3IONS-SCNC: 11.8 MMOL/L (ref 5–15)
AST SERPL-CCNC: 22 U/L (ref 1–32)
BILIRUB SERPL-MCNC: 0.4 MG/DL (ref 0–1.2)
BUN SERPL-MCNC: 25 MG/DL (ref 8–23)
BUN/CREAT SERPL: 11.3 (ref 7–25)
CALCIUM SPEC-SCNC: 9.4 MG/DL (ref 8.6–10.5)
CHLORIDE SERPL-SCNC: 104 MMOL/L (ref 98–107)
CO2 SERPL-SCNC: 24.2 MMOL/L (ref 22–29)
CREAT SERPL-MCNC: 2.21 MG/DL (ref 0.57–1)
EGFRCR SERPLBLD CKD-EPI 2021: 24 ML/MIN/1.73
GLOBULIN UR ELPH-MCNC: 3.2 GM/DL
GLUCOSE SERPL-MCNC: 169 MG/DL (ref 65–99)
PHOSPHATE SERPL-MCNC: 2.9 MG/DL (ref 2.5–4.5)
POTASSIUM SERPL-SCNC: 4.8 MMOL/L (ref 3.5–5.2)
PROT SERPL-MCNC: 7.6 G/DL (ref 6–8.5)
SODIUM SERPL-SCNC: 140 MMOL/L (ref 136–145)

## 2025-04-21 PROCEDURE — 84100 ASSAY OF PHOSPHORUS: CPT

## 2025-04-21 PROCEDURE — 83970 ASSAY OF PARATHORMONE: CPT

## 2025-04-21 PROCEDURE — 36415 COLL VENOUS BLD VENIPUNCTURE: CPT

## 2025-04-21 PROCEDURE — 80053 COMPREHEN METABOLIC PANEL: CPT

## 2025-04-21 PROCEDURE — 82306 VITAMIN D 25 HYDROXY: CPT

## 2025-04-22 LAB
25(OH)D3 SERPL-MCNC: 13.8 NG/ML (ref 30–100)
PTH-INTACT SERPL-MCNC: 150 PG/ML (ref 15–65)

## 2025-06-20 ENCOUNTER — TRANSCRIBE ORDERS (OUTPATIENT)
Dept: ADMINISTRATIVE | Facility: HOSPITAL | Age: 65
End: 2025-06-20
Payer: MEDICARE

## 2025-06-20 ENCOUNTER — LAB (OUTPATIENT)
Dept: LAB | Facility: HOSPITAL | Age: 65
End: 2025-06-20
Payer: MEDICARE

## 2025-06-20 DIAGNOSIS — E55.9 VITAMIN D DEFICIENCY: ICD-10-CM

## 2025-06-20 DIAGNOSIS — E53.8 VITAMIN B12 DEFICIENCY (NON ANEMIC): ICD-10-CM

## 2025-06-20 DIAGNOSIS — N18.4 CHRONIC KIDNEY DISEASE, STAGE IV (SEVERE): ICD-10-CM

## 2025-06-20 DIAGNOSIS — E83.52 HYPERCALCEMIA: ICD-10-CM

## 2025-06-20 DIAGNOSIS — N25.81 HYPERPARATHYROIDISM, SECONDARY RENAL: ICD-10-CM

## 2025-06-20 DIAGNOSIS — E11.69: ICD-10-CM

## 2025-06-20 DIAGNOSIS — E11.69: Primary | ICD-10-CM

## 2025-06-20 DIAGNOSIS — N25.81 HYPERPARATHYROIDISM, SECONDARY RENAL: Primary | ICD-10-CM

## 2025-06-20 LAB
ALBUMIN SERPL-MCNC: 4.2 G/DL (ref 3.5–5.2)
ALBUMIN/GLOB SERPL: 1.4 G/DL
ALP SERPL-CCNC: 60 U/L (ref 39–117)
ALT SERPL W P-5'-P-CCNC: 24 U/L (ref 1–33)
ANION GAP SERPL CALCULATED.3IONS-SCNC: 12.7 MMOL/L (ref 5–15)
AST SERPL-CCNC: 25 U/L (ref 1–32)
BASOPHILS # BLD AUTO: 0.03 10*3/MM3 (ref 0–0.2)
BASOPHILS NFR BLD AUTO: 0.7 % (ref 0–1.5)
BILIRUB SERPL-MCNC: 0.3 MG/DL (ref 0–1.2)
BUN SERPL-MCNC: 25.3 MG/DL (ref 8–23)
BUN/CREAT SERPL: 11.5 (ref 7–25)
CALCIUM SPEC-SCNC: 9.4 MG/DL (ref 8.6–10.5)
CHLORIDE SERPL-SCNC: 102 MMOL/L (ref 98–107)
CHOLEST SERPL-MCNC: 127 MG/DL (ref 0–200)
CO2 SERPL-SCNC: 21.3 MMOL/L (ref 22–29)
CREAT SERPL-MCNC: 2.2 MG/DL (ref 0.57–1)
DEPRECATED RDW RBC AUTO: 42.4 FL (ref 37–54)
EGFRCR SERPLBLD CKD-EPI 2021: 24 ML/MIN/1.73
EOSINOPHIL # BLD AUTO: 0.13 10*3/MM3 (ref 0–0.4)
EOSINOPHIL NFR BLD AUTO: 3.1 % (ref 0.3–6.2)
ERYTHROCYTE [DISTWIDTH] IN BLOOD BY AUTOMATED COUNT: 12.9 % (ref 12.3–15.4)
GLOBULIN UR ELPH-MCNC: 3 GM/DL
GLUCOSE SERPL-MCNC: 185 MG/DL (ref 65–99)
HBA1C MFR BLD: 6.27 % (ref 4.8–5.6)
HCT VFR BLD AUTO: 30.7 % (ref 34–46.6)
HDLC SERPL-MCNC: 22 MG/DL (ref 40–60)
HGB BLD-MCNC: 10 G/DL (ref 12–15.9)
IMM GRANULOCYTES # BLD AUTO: 0.02 10*3/MM3 (ref 0–0.05)
IMM GRANULOCYTES NFR BLD AUTO: 0.5 % (ref 0–0.5)
LDLC SERPL CALC-MCNC: 52 MG/DL (ref 0–100)
LDLC/HDLC SERPL: 1.66 {RATIO}
LYMPHOCYTES # BLD AUTO: 0.91 10*3/MM3 (ref 0.7–3.1)
LYMPHOCYTES NFR BLD AUTO: 21.9 % (ref 19.6–45.3)
MCH RBC QN AUTO: 29.8 PG (ref 26.6–33)
MCHC RBC AUTO-ENTMCNC: 32.6 G/DL (ref 31.5–35.7)
MCV RBC AUTO: 91.4 FL (ref 79–97)
MONOCYTES # BLD AUTO: 0.36 10*3/MM3 (ref 0.1–0.9)
MONOCYTES NFR BLD AUTO: 8.7 % (ref 5–12)
NEUTROPHILS NFR BLD AUTO: 2.71 10*3/MM3 (ref 1.7–7)
NEUTROPHILS NFR BLD AUTO: 65.1 % (ref 42.7–76)
NRBC BLD AUTO-RTO: 0 /100 WBC (ref 0–0.2)
PHOSPHATE SERPL-MCNC: 3.5 MG/DL (ref 2.5–4.5)
PLATELET # BLD AUTO: 175 10*3/MM3 (ref 140–450)
PMV BLD AUTO: 11.7 FL (ref 6–12)
POTASSIUM SERPL-SCNC: 4.4 MMOL/L (ref 3.5–5.2)
PROT SERPL-MCNC: 7.2 G/DL (ref 6–8.5)
PTH-INTACT SERPL-MCNC: 108 PG/ML (ref 17.9–58.6)
RBC # BLD AUTO: 3.36 10*6/MM3 (ref 3.77–5.28)
SODIUM SERPL-SCNC: 136 MMOL/L (ref 136–145)
T4 FREE SERPL-MCNC: 0.72 NG/DL (ref 0.92–1.68)
TRIGL SERPL-MCNC: 342 MG/DL (ref 0–150)
TSH SERPL DL<=0.05 MIU/L-ACNC: 2.82 UIU/ML (ref 0.27–4.2)
VLDLC SERPL-MCNC: 53 MG/DL (ref 5–40)
WBC NRBC COR # BLD AUTO: 4.16 10*3/MM3 (ref 3.4–10.8)

## 2025-06-20 PROCEDURE — 80061 LIPID PANEL: CPT

## 2025-06-20 PROCEDURE — 83970 ASSAY OF PARATHORMONE: CPT

## 2025-06-20 PROCEDURE — 80053 COMPREHEN METABOLIC PANEL: CPT

## 2025-06-20 PROCEDURE — 82607 VITAMIN B-12: CPT

## 2025-06-20 PROCEDURE — 82306 VITAMIN D 25 HYDROXY: CPT

## 2025-06-20 PROCEDURE — 84100 ASSAY OF PHOSPHORUS: CPT

## 2025-06-20 PROCEDURE — 83036 HEMOGLOBIN GLYCOSYLATED A1C: CPT

## 2025-06-20 PROCEDURE — 84443 ASSAY THYROID STIM HORMONE: CPT

## 2025-06-20 PROCEDURE — 36415 COLL VENOUS BLD VENIPUNCTURE: CPT

## 2025-06-20 PROCEDURE — 85025 COMPLETE CBC W/AUTO DIFF WBC: CPT

## 2025-06-20 PROCEDURE — 84439 ASSAY OF FREE THYROXINE: CPT

## 2025-06-21 LAB
25(OH)D3 SERPL-MCNC: 30.1 NG/ML (ref 30–100)
VIT B12 BLD-MCNC: 205 PG/ML (ref 211–946)

## 2025-06-23 ENCOUNTER — LAB (OUTPATIENT)
Dept: LAB | Facility: HOSPITAL | Age: 65
End: 2025-06-23
Payer: MEDICARE

## 2025-06-23 DIAGNOSIS — N25.81 HYPERPARATHYROIDISM, SECONDARY RENAL: ICD-10-CM

## 2025-06-23 DIAGNOSIS — E83.52 HYPERCALCEMIA: ICD-10-CM

## 2025-06-23 DIAGNOSIS — N18.4 CHRONIC KIDNEY DISEASE, STAGE IV (SEVERE): ICD-10-CM

## 2025-06-23 DIAGNOSIS — E53.8 VITAMIN B12 DEFICIENCY (NON ANEMIC): ICD-10-CM

## 2025-06-23 DIAGNOSIS — E11.69: ICD-10-CM

## 2025-06-23 DIAGNOSIS — E55.9 VITAMIN D DEFICIENCY: ICD-10-CM

## 2025-06-23 PROCEDURE — 82570 ASSAY OF URINE CREATININE: CPT

## 2025-06-23 PROCEDURE — 82043 UR ALBUMIN QUANTITATIVE: CPT

## 2025-06-23 PROCEDURE — 84156 ASSAY OF PROTEIN URINE: CPT

## 2025-06-24 LAB
ALBUMIN UR-MCNC: 9.8 MG/DL
CREAT UR-MCNC: 38.6 MG/DL
CREAT UR-MCNC: 38.6 MG/DL
MICROALBUMIN/CREAT UR: 253.9 MG/G (ref 0–29)
PROT ?TM UR-MCNC: 28 MG/DL
PROT/CREAT UR: 725.4 MG/G CREA (ref 0–200)

## (undated) DEVICE — TRAP,MUCUS SPECIMEN,40CC: Brand: MEDLINE

## (undated) DEVICE — FRCP BX RADJAW4 NDL 2.8 240CM LG OG BX40

## (undated) DEVICE — CONN Y IRR DISP 1P/U

## (undated) DEVICE — SINGLE PORT MANIFOLD: Brand: NEPTUNE 2

## (undated) DEVICE — TUBING, SUCTION, 1/4" X 20', STRAIGHT: Brand: MEDLINE INDUSTRIES, INC.

## (undated) DEVICE — Device: Brand: ENDOGATOR

## (undated) DEVICE — Device

## (undated) DEVICE — SYR LUERLOK 30CC

## (undated) DEVICE — THE BITE BLOCK MAXI, LATEX FREE STRAP IS USED TO PROTECT THE ENDOSCOPE INSERTION TUBE FROM BEING BITTEN BY THE PATIENT.

## (undated) DEVICE — Device: Brand: DEFENDO AIR/WATER/SUCTION AND BIOPSY VALVE